# Patient Record
Sex: MALE | Race: WHITE | NOT HISPANIC OR LATINO | Employment: OTHER | URBAN - NONMETROPOLITAN AREA
[De-identification: names, ages, dates, MRNs, and addresses within clinical notes are randomized per-mention and may not be internally consistent; named-entity substitution may affect disease eponyms.]

---

## 2017-01-26 PROBLEM — I25.10 CAD (CORONARY ARTERY DISEASE): Status: ACTIVE | Noted: 2017-01-26

## 2017-01-26 PROBLEM — I10 HTN (HYPERTENSION): Status: ACTIVE | Noted: 2017-01-26

## 2017-01-26 PROBLEM — E78.5 HYPERLIPIDEMIA: Status: ACTIVE | Noted: 2017-01-26

## 2017-01-26 PROBLEM — I21.9 MYOCARDIAL INFARCT (HCC): Status: ACTIVE | Noted: 2017-01-26

## 2017-01-26 RX ORDER — CHLORAL HYDRATE 500 MG
CAPSULE ORAL
COMMUNITY
End: 2020-01-02 | Stop reason: HOSPADM

## 2017-01-26 RX ORDER — SIMVASTATIN 40 MG
40 TABLET ORAL NIGHTLY
COMMUNITY
End: 2017-01-30 | Stop reason: SDUPTHER

## 2017-01-26 RX ORDER — AMLODIPINE BESYLATE 10 MG/1
10 TABLET ORAL DAILY
COMMUNITY
End: 2017-01-30 | Stop reason: SDUPTHER

## 2017-01-26 RX ORDER — ASPIRIN 81 MG/1
81 TABLET ORAL DAILY
Status: ON HOLD | COMMUNITY
End: 2020-01-02 | Stop reason: SDUPTHER

## 2017-01-30 ENCOUNTER — OFFICE VISIT (OUTPATIENT)
Dept: CARDIOLOGY | Facility: CLINIC | Age: 82
End: 2017-01-30

## 2017-01-30 VITALS
WEIGHT: 186 LBS | DIASTOLIC BLOOD PRESSURE: 78 MMHG | BODY MASS INDEX: 28.19 KG/M2 | SYSTOLIC BLOOD PRESSURE: 138 MMHG | HEART RATE: 78 BPM | HEIGHT: 68 IN

## 2017-01-30 DIAGNOSIS — I25.10 CORONARY ARTERY DISEASE INVOLVING NATIVE HEART WITHOUT ANGINA PECTORIS, UNSPECIFIED VESSEL OR LESION TYPE: Primary | ICD-10-CM

## 2017-01-30 DIAGNOSIS — I10 ESSENTIAL HYPERTENSION: ICD-10-CM

## 2017-01-30 DIAGNOSIS — E78.2 MIXED HYPERLIPIDEMIA: ICD-10-CM

## 2017-01-30 PROCEDURE — 93000 ELECTROCARDIOGRAM COMPLETE: CPT | Performed by: NURSE PRACTITIONER

## 2017-01-30 PROCEDURE — 99213 OFFICE O/P EST LOW 20 MIN: CPT | Performed by: NURSE PRACTITIONER

## 2017-01-30 RX ORDER — LISINOPRIL 20 MG/1
20 TABLET ORAL DAILY
Qty: 30 TABLET | Refills: 0 | Status: SHIPPED | OUTPATIENT
Start: 2017-01-30 | End: 2017-12-28 | Stop reason: SDUPTHER

## 2017-01-30 RX ORDER — LISINOPRIL 20 MG/1
20 TABLET ORAL DAILY
Qty: 90 TABLET | Refills: 3 | Status: SHIPPED | OUTPATIENT
Start: 2017-01-30 | End: 2017-12-28 | Stop reason: SDUPTHER

## 2017-01-30 RX ORDER — AMLODIPINE BESYLATE 10 MG/1
10 TABLET ORAL DAILY
Qty: 90 TABLET | Refills: 3 | Status: SHIPPED | OUTPATIENT
Start: 2017-01-30 | End: 2019-02-11 | Stop reason: SDUPTHER

## 2017-01-30 RX ORDER — SIMVASTATIN 40 MG
40 TABLET ORAL NIGHTLY
Qty: 90 TABLET | Refills: 3 | Status: SHIPPED | OUTPATIENT
Start: 2017-01-30 | End: 2019-02-11 | Stop reason: SDUPTHER

## 2017-01-30 NOTE — PROGRESS NOTES
"    Subjective:     Encounter Date:01/30/2017      Patient ID: Mickey Conde is a 81 y.o. male.  He presents to the clinic today for one year follow-up of CAD.  He was last seen in office 10/2015 and had c/o of dizziness at that time.  He denies any CP, dyspnea, dizziness, or syncope since that visit.  He reports mild BLE edema.  His BP is well controlled in office today, and he reports that it is well controlled when checked at home.  His cholesterol is managed by his PCP, and he reports that it was \"good\" on his last yearly check.  Last stress echo in 2009 was negative for ischemia and showed evidence of previous septal infarct.  Lexiscan in 2012 was negative for ischemia.  He states that he is doing well overall.     Chief Complaint:  Coronary Artery Disease   Presents for follow-up visit. Symptoms include leg swelling (Pt reports as mild. ). Pertinent negatives include no chest pain, chest pressure, chest tightness, dizziness, muscle weakness, palpitations, shortness of breath or weight gain. Risk factors include hyperlipidemia. The symptoms have been stable. Compliance with diet is variable. Compliance with exercise is variable. Compliance with medications is good.   Hypertension   This is a chronic problem. The current episode started more than 1 year ago. The problem is controlled. Associated symptoms include peripheral edema. Pertinent negatives include no blurred vision, chest pain, headaches, malaise/fatigue, orthopnea, palpitations, PND or shortness of breath. Risk factors for coronary artery disease include dyslipidemia and male gender. Past treatments include ACE inhibitors. Hypertensive end-organ damage includes CAD/MI.   Hyperlipidemia   This is a chronic problem. The current episode started more than 1 year ago. The problem is controlled. Recent lipid tests were reviewed and are normal (As reported by patient. Managed by PCP. ). Pertinent negatives include no chest pain, leg pain, myalgias or " shortness of breath. Risk factors for coronary artery disease include dyslipidemia, male sex and hypertension.       The following portions of the patient's history were reviewed and updated as appropriate: allergies, current medications, past family history, past medical history, past social history, past surgical history and problem list.   Allergies   Allergen Reactions   • Penicillins        Current Outpatient Prescriptions:   •  amLODIPine (NORVASC) 10 MG tablet, Take 1 tablet by mouth Daily., Disp: 90 tablet, Rfl: 3  •  aspirin 81 MG EC tablet, Take 81 mg by mouth Daily., Disp: , Rfl:   •  lisinopril (PRINIVIL,ZESTRIL) 20 MG tablet, Take 1 tablet by mouth Daily., Disp: 90 tablet, Rfl: 3  •  Omega-3 Fatty Acids (FISH OIL) 1000 MG capsule capsule, Take  by mouth Daily With Breakfast., Disp: , Rfl:   •  simvastatin (ZOCOR) 40 MG tablet, Take 1 tablet by mouth Every Night., Disp: 90 tablet, Rfl: 3  •  lisinopril (PRINIVIL,ZESTRIL) 20 MG tablet, Take 1 tablet by mouth Daily., Disp: 30 tablet, Rfl: 0  Past Medical History   Diagnosis Date   • CAD (coronary artery disease)    • Hyperlipidemia    • Hypertension    • Myocardial infarction      Family History   Problem Relation Age of Onset   • ALS Mother    • Heart attack Father    • Alzheimer's disease Maternal Grandfather    • Heart attack Paternal Grandfather      Social History     Social History   • Marital status: Unknown     Spouse name: N/A   • Number of children: N/A   • Years of education: N/A     Occupational History   • Not on file.     Social History Main Topics   • Smoking status: Former Smoker     Types: Cigarettes     Quit date: 1990   • Smokeless tobacco: Never Used   • Alcohol use No   • Drug use: No   • Sexual activity: Defer     Other Topics Concern   • Not on file     Social History Narrative     Past Surgical History   Procedure Laterality Date   • Rotator cuff repair     • Shoulder surgery     • Carotid stent     • Back surgery     • Tennis elbow  "release           Review of Systems   Constitution: Negative for chills, decreased appetite, fever, weakness, malaise/fatigue, night sweats, weight gain and weight loss.   HENT: Negative for headaches and nosebleeds.    Eyes: Negative for blurred vision and visual disturbance.   Cardiovascular: Positive for leg swelling (Pt reports as mild. ). Negative for chest pain, dyspnea on exertion, near-syncope, orthopnea, palpitations, paroxysmal nocturnal dyspnea and syncope.   Respiratory: Negative for chest tightness, cough, hemoptysis, shortness of breath, snoring and wheezing.    Endocrine: Negative for cold intolerance and heat intolerance.   Hematologic/Lymphatic: Does not bruise/bleed easily.   Skin: Negative for rash.   Musculoskeletal: Negative for back pain, falls, muscle weakness and myalgias.   Gastrointestinal: Negative for abdominal pain, change in bowel habit, constipation, diarrhea, dysphagia, heartburn, nausea and vomiting.   Genitourinary: Negative for hematuria.   Neurological: Negative for dizziness and light-headedness.   Psychiatric/Behavioral: Negative for altered mental status, depression and substance abuse. The patient does not have insomnia.    Allergic/Immunologic: Negative for persistent infections.         ECG 12 Lead  Date/Time: 1/30/2017 9:13 AM  Performed by: VALDO PALACIOS  Authorized by: VALDO PALACIOS   Comparison: compared with previous ECG from 10/30/2015  Rhythm: sinus rhythm and A-V block  Ectopy: infrequent PVCs  Rate: normal  BPM: 78  Conduction: 1st degree  Clinical impression: abnormal ECG          Visit Vitals   • /78   • Pulse 78   • Ht 68\" (172.7 cm)   • Wt 186 lb (84.4 kg)   • BMI 28.28 kg/m2            Objective:     Physical Exam   Constitutional: He is oriented to person, place, and time. Vital signs are normal. He appears well-developed and well-nourished.   HENT:   Head: Normocephalic and atraumatic.   Eyes: Conjunctivae and EOM are normal. Pupils are equal, " round, and reactive to light.   Neck: Normal range of motion. Neck supple. No JVD present.   Cardiovascular: Normal rate, regular rhythm, S1 normal, S2 normal, normal heart sounds, intact distal pulses and normal pulses.  Exam reveals no gallop and no friction rub.    No murmur heard.  Pulses:       Radial pulses are 2+ on the right side, and 2+ on the left side.        Dorsalis pedis pulses are 2+ on the right side, and 2+ on the left side.        Posterior tibial pulses are 2+ on the right side, and 2+ on the left side.   Mild BLE edema.    Pulmonary/Chest: Effort normal and breath sounds normal. No respiratory distress. He has no wheezes.   Abdominal: Soft. Bowel sounds are normal. He exhibits no distension.   Musculoskeletal: Normal range of motion. He exhibits no edema or deformity.   Neurological: He is alert and oriented to person, place, and time.   Skin: Skin is warm and dry.   Psychiatric: He has a normal mood and affect. His behavior is normal. Judgment and thought content normal.   Vitals reviewed.        Assessment:          Diagnosis Plan   1. Coronary artery disease involving native heart without angina pectoris, unspecified vessel or lesion type  On ACE, ASA, and statin. No clinical s/s of ischemia.     on statin and asa.    2. Mixed hyperlipidemia  On statin. Managed by PCP.     on statin, followed by PCP   3. Essential hypertension  On CCB and ACE. Managed by PCP.     Well controlled. Managed by PCP.           Plan:       1. Continue AHA diet and regular exercise.  2. Continue medications as previously prescribed.  3. Report worsening edema in BLE or dyspnea.   4. Continue follow-up with PCP for BP and cholesterol management.   5. Follow-up in one year, or sooner if needed.

## 2017-01-30 NOTE — MR AVS SNAPSHOT
Mickey ALVA Bowies   1/30/2017 8:30 AM   Office Visit    Dept Phone:  [phone]   Encounter #:  [id number]    Provider:  [facility] NP   Department:  [facility]                Your Full Care Plan              Today's Medication Changes          These changes are accurate as of: 1/30/17  9:08 AM.  If you have any questions, ask your nurse or doctor.               Medication(s)that have changed:     * lisinopril 20 MG tablet   Commonly known as:  PRINIVIL,ZESTRIL   Take 1 tablet by mouth Daily.   What changed:  See the new instructions.       * lisinopril 20 MG tablet   Commonly known as:  PRINIVIL,ZESTRIL   Take 1 tablet by mouth Daily.   What changed:  You were already taking a medication with the same name, and this prescription was added. Make sure you understand how and when to take each.       * Notice:  This list has 2 medication(s) that are the same as other medications prescribed for you. Read the directions carefully, and ask your doctor or other care provider to review them with you.         Where to Get Your Medications      These medications were sent to [id number] - [place], [place] - [address] [facility] - [phone]  - [phone] FX  [address], [place] [address]     Phone:  [phone]     amLODIPine 10 MG tablet    lisinopril 20 MG tablet    lisinopril 20 MG tablet    simvastatin 40 MG tablet                  Your Updated Medication List          This list is accurate as of: 1/30/17  9:08 AM.  Always use your most recent med list.                amLODIPine 10 MG tablet   Commonly known as:  NORVASC   Take 1 tablet by mouth Daily.       aspirin 81 MG EC tablet       fish oil 1000 MG capsule capsule       * lisinopril 20 MG tablet   Commonly known as:  PRINIVIL,ZESTRIL   Take 1 tablet by mouth Daily.       * lisinopril 20 MG tablet   Commonly known as:  PRINIVIL,ZESTRIL   Take 1 tablet by mouth Daily.       simvastatin 40 MG tablet      Commonly known as:  ZOCOR   Take 1 tablet by mouth Every Night.       * Notice:  This list has 2 medication(s) that are the same as other medications prescribed for you. Read the directions carefully, and ask your doctor or other care provider to review them with you.            You Were Diagnosed With        Codes Comments    Coronary artery disease involving native heart without angina pectoris, unspecified vessel or lesion type    -  Primary ICD-10-CM: I25.10  ICD-9-CM: 414.01 on statin and asa.     Mixed hyperlipidemia     ICD-10-CM: E78.2  ICD-9-CM: 272.2 on statin, followed by PCP    Essential hypertension     ICD-10-CM: I10  ICD-9-CM: 401.9 Well controlled. Managed by PCP.       Instructions     None    Patient Instructions History      Upcoming Appointments     Visit Type Date Time Department    FOLLOW UP 2017  8:30 AM [facility]      [facility] Signup     [employer] allows you to send messages to your doctor, view your test results, renew your prescriptions, schedule appointments, and more. To sign up, go to [employer] and click on the Sign Up Now link in the New User? box. Enter your Outright Activation Code exactly as it appears below along with the last four digits of your Social Security Number and your Date of Birth () to complete the sign-up process. If you do not sign up before the expiration date, you must request a new code.    Outright Activation Code: [email or web address]  Expires: 2017  9:06 AM    If you have questions, you can email [email or web address] or call [phone] to talk to our Outright staff. Remember, Outright is NOT to be used for urgent needs. For medical emergencies, dial 911.               Other Info from Your Visit           Allergies     Penicillins        Reason for Visit     Follow-up 1 YR, Recheck CAD    Coronary Artery Disease           Vital Signs     Blood Pressure Pulse Height Weight Body Mass Index Smoking Status     "138/78 78 68\" (172.7 cm) 186 lb (84.4 kg) 28.28 kg/m2 Former Smoker      Problems and Diagnoses Noted     Coronary heart disease    High blood pressure    High cholesterol or triglycerides        "

## 2017-01-30 NOTE — LETTER
"January 30, 2017     Ki Tamez MD  [address] [facility] [address]    Patient: Mickey Conde   YOB: 1935   Date of Visit: 1/30/2017       Dear Dr. Dashawn MD:    Mickey Conde was in my office today. Below is a copy of my note.    If you have questions, please do not hesitate to call me. I look forward to following Mickey along with you.         Sincerely,        [facility] NP        CC: No Recipients        Subjective:     Encounter Date:01/30/2017      Patient ID: Mickey Conde is a 81 y.o. male.  He presents to the clinic today for one year follow-up of CAD.  He was last seen in office 10/2015 and had c/o of dizziness at that time.  He denies any CP, dyspnea, dizziness, or syncope since that visit.  He reports mild BLE edema.  His BP is well controlled in office today, and he reports that it is well controlled when checked at home.  His cholesterol is managed by his PCP, and he reports that it was \"good\" on his last yearly check.  Last stress echo in 2009 was negative for ischemia and showed evidence of previous septal infarct.  Lexiscan in 2012 was negative for ischemia.  He states that he is doing well overall.     Chief Complaint:  Coronary Artery Disease   Presents for follow-up visit. Symptoms include leg swelling (Pt reports as mild. ). Pertinent negatives include no chest pain, chest pressure, chest tightness, dizziness, muscle weakness, palpitations, shortness of breath or weight gain. Risk factors include hyperlipidemia. The symptoms have been stable. Compliance with diet is variable. Compliance with exercise is variable. Compliance with medications is good.   Hypertension   This is a chronic problem. The current episode started more than 1 year ago. The problem is controlled. Associated symptoms include peripheral edema. Pertinent negatives include no blurred vision, chest pain, headaches, malaise/fatigue, orthopnea, palpitations, PND or shortness of breath. Risk factors " for coronary artery disease include dyslipidemia and male gender. Past treatments include ACE inhibitors. Hypertensive end-organ damage includes CAD/MI.   Hyperlipidemia   This is a chronic problem. The current episode started more than 1 year ago. The problem is controlled. Recent lipid tests were reviewed and are normal (As reported by patient. Managed by PCP. ). Pertinent negatives include no chest pain, leg pain, myalgias or shortness of breath. Risk factors for coronary artery disease include dyslipidemia, male sex and hypertension.       The following portions of the patient's history were reviewed and updated as appropriate: allergies, current medications, past family history, past medical history, past social history, past surgical history and problem list.   Allergies   Allergen Reactions   • Penicillins        Current Outpatient Prescriptions:   •  amLODIPine (NORVASC) 10 MG tablet, Take 1 tablet by mouth Daily., Disp: 90 tablet, Rfl: 3  •  aspirin 81 MG EC tablet, Take 81 mg by mouth Daily., Disp: , Rfl:   •  lisinopril (PRINIVIL,ZESTRIL) 20 MG tablet, Take 1 tablet by mouth Daily., Disp: 90 tablet, Rfl: 3  •  Omega-3 Fatty Acids (FISH OIL) 1000 MG capsule capsule, Take  by mouth Daily With Breakfast., Disp: , Rfl:   •  simvastatin (ZOCOR) 40 MG tablet, Take 1 tablet by mouth Every Night., Disp: 90 tablet, Rfl: 3  •  lisinopril (PRINIVIL,ZESTRIL) 20 MG tablet, Take 1 tablet by mouth Daily., Disp: 30 tablet, Rfl: 0  Past Medical History   Diagnosis Date   • CAD (coronary artery disease)    • Hyperlipidemia    • Hypertension    • Myocardial infarction      Family History   Problem Relation Age of Onset   • ALS Mother    • Heart attack Father    • Alzheimer's disease Maternal Grandfather    • Heart attack Paternal Grandfather      Social History     Social History   • Marital status: Unknown     Spouse name: N/A   • Number of children: N/A   • Years of education: N/A     Occupational History   • Not on file.      Social History Main Topics   • Smoking status: Former Smoker     Types: Cigarettes     Quit date: 1990   • Smokeless tobacco: Never Used   • Alcohol use No   • Drug use: No   • Sexual activity: Defer     Other Topics Concern   • Not on file     Social History Narrative     Past Surgical History   Procedure Laterality Date   • Rotator cuff repair     • Shoulder surgery     • Carotid stent     • Back surgery     • Tennis elbow release           Review of Systems   Constitution: Negative for chills, decreased appetite, fever, weakness, malaise/fatigue, night sweats, weight gain and weight loss.   HENT: Negative for headaches and nosebleeds.    Eyes: Negative for blurred vision and visual disturbance.   Cardiovascular: Positive for leg swelling (Pt reports as mild. ). Negative for chest pain, dyspnea on exertion, near-syncope, orthopnea, palpitations, paroxysmal nocturnal dyspnea and syncope.   Respiratory: Negative for chest tightness, cough, hemoptysis, shortness of breath, snoring and wheezing.    Endocrine: Negative for cold intolerance and heat intolerance.   Hematologic/Lymphatic: Does not bruise/bleed easily.   Skin: Negative for rash.   Musculoskeletal: Negative for back pain, falls, muscle weakness and myalgias.   Gastrointestinal: Negative for abdominal pain, change in bowel habit, constipation, diarrhea, dysphagia, heartburn, nausea and vomiting.   Genitourinary: Negative for hematuria.   Neurological: Negative for dizziness and light-headedness.   Psychiatric/Behavioral: Negative for altered mental status, depression and substance abuse. The patient does not have insomnia.    Allergic/Immunologic: Negative for persistent infections.         ECG 12 Lead  Date/Time: 1/30/2017 9:13 AM  Performed by: VALDO PALACIOS  Authorized by: VALDO PALACIOS   Comparison: compared with previous ECG from 10/30/2015  Rhythm: sinus rhythm and A-V block  Ectopy: infrequent PVCs  Rate: normal  BPM: 78  Conduction: 1st  "degree  Clinical impression: abnormal ECG          Visit Vitals   • /78   • Pulse 78   • Ht 68\" (172.7 cm)   • Wt 186 lb (84.4 kg)   • BMI 28.28 kg/m2            Objective:     Physical Exam   Constitutional: He is oriented to person, place, and time. Vital signs are normal. He appears well-developed and well-nourished.   HENT:   Head: Normocephalic and atraumatic.   Eyes: Conjunctivae and EOM are normal. Pupils are equal, round, and reactive to light.   Neck: Normal range of motion. Neck supple. No JVD present.   Cardiovascular: Normal rate, regular rhythm, S1 normal, S2 normal, normal heart sounds, intact distal pulses and normal pulses.  Exam reveals no gallop and no friction rub.    No murmur heard.  Pulses:       Radial pulses are 2+ on the right side, and 2+ on the left side.        Dorsalis pedis pulses are 2+ on the right side, and 2+ on the left side.        Posterior tibial pulses are 2+ on the right side, and 2+ on the left side.   Mild BLE edema.    Pulmonary/Chest: Effort normal and breath sounds normal. No respiratory distress. He has no wheezes.   Abdominal: Soft. Bowel sounds are normal. He exhibits no distension.   Musculoskeletal: Normal range of motion. He exhibits no edema or deformity.   Neurological: He is alert and oriented to person, place, and time.   Skin: Skin is warm and dry.   Psychiatric: He has a normal mood and affect. His behavior is normal. Judgment and thought content normal.   Vitals reviewed.        Assessment:          Diagnosis Plan   1. Coronary artery disease involving native heart without angina pectoris, unspecified vessel or lesion type  On ACE, ASA, and statin. No clinical s/s of ischemia.     on statin and asa.    2. Mixed hyperlipidemia  On statin. Managed by PCP.     on statin, followed by PCP   3. Essential hypertension  On CCB and ACE. Managed by PCP.     Well controlled. Managed by PCP.           Plan:       1. Continue AHA diet and regular exercise.  2. " Continue medications as previously prescribed.  3. Report worsening edema in BLE or dyspnea.   4. Continue follow-up with PCP for BP and cholesterol management.   5. Follow-up in one year, or sooner if needed.

## 2017-10-30 ENCOUNTER — OFFICE VISIT (OUTPATIENT)
Dept: VASCULAR SURGERY | Age: 82
End: 2017-10-30
Payer: MEDICARE

## 2017-10-30 VITALS
HEART RATE: 109 BPM | SYSTOLIC BLOOD PRESSURE: 128 MMHG | TEMPERATURE: 97.3 F | DIASTOLIC BLOOD PRESSURE: 78 MMHG | RESPIRATION RATE: 18 BRPM

## 2017-10-30 DIAGNOSIS — I71.40 ABDOMINAL AORTIC ANEURYSM (AAA) WITHOUT RUPTURE: Primary | ICD-10-CM

## 2017-10-30 PROCEDURE — G8484 FLU IMMUNIZE NO ADMIN: HCPCS | Performed by: PHYSICIAN ASSISTANT

## 2017-10-30 PROCEDURE — 1123F ACP DISCUSS/DSCN MKR DOCD: CPT | Performed by: PHYSICIAN ASSISTANT

## 2017-10-30 PROCEDURE — G8421 BMI NOT CALCULATED: HCPCS | Performed by: PHYSICIAN ASSISTANT

## 2017-10-30 PROCEDURE — 1036F TOBACCO NON-USER: CPT | Performed by: PHYSICIAN ASSISTANT

## 2017-10-30 PROCEDURE — 99203 OFFICE O/P NEW LOW 30 MIN: CPT | Performed by: PHYSICIAN ASSISTANT

## 2017-10-30 PROCEDURE — G8427 DOCREV CUR MEDS BY ELIG CLIN: HCPCS | Performed by: PHYSICIAN ASSISTANT

## 2017-10-30 PROCEDURE — 4040F PNEUMOC VAC/ADMIN/RCVD: CPT | Performed by: PHYSICIAN ASSISTANT

## 2017-10-30 RX ORDER — LISINOPRIL 20 MG/1
20 TABLET ORAL
COMMUNITY
Start: 2017-01-30

## 2017-10-30 RX ORDER — AMLODIPINE BESYLATE 10 MG/1
10 TABLET ORAL
COMMUNITY
Start: 2017-01-30

## 2017-10-30 RX ORDER — ASPIRIN 81 MG/1
81 TABLET ORAL
COMMUNITY

## 2017-10-30 RX ORDER — LISINOPRIL 20 MG/1
20 TABLET ORAL
COMMUNITY
Start: 2017-01-30 | End: 2017-10-30

## 2017-10-30 RX ORDER — SIMVASTATIN 40 MG
40 TABLET ORAL
COMMUNITY
Start: 2017-01-30

## 2017-10-30 RX ORDER — CHLORAL HYDRATE 500 MG
CAPSULE ORAL
COMMUNITY

## 2017-10-30 NOTE — PROGRESS NOTES
Patient Care Team:  Reggie Germain as PCP - General      History and Physical:    Mr. No Bautista is a 81 yo male who presents today as a referral from Dr. Cole Baker for evaluation of a infrarenal AAA found on an X-ray lumbar spine. This was done for c/o back pain. He had a CTA abd/pelvis which showed a AAA measuring 3.2 cm with possible penetrating ulcer, DDD at multiple levels. Current medication include statin and asa daily. He has not had abdominal pain. He reports that he has chronic CBP and has a jacobo machine which he uses for his back pain. Approximately 1-2 weeks ago he recalls straining to get upright from his jacobo machine and developed right lower back pain. He took Ibuprofen followed by 2 Aleve which relieved  the pain. He has not had any further pain. Camille Chang is a 80 y.o. male with the following history reviewed and recorded in [facility]:  Patient Active Problem List    Diagnosis Date Noted    Abdominal aortic aneurysm (AAA) without rupture ([facility] [address] 10/30/2017     Current Outpatient Prescriptions   Medication Sig Dispense Refill    amLODIPine (NORVASC) 10 MG tablet Take 10 mg by mouth      aspirin EC 81 MG EC tablet Take 81 mg by mouth      lisinopril (PRINIVIL;ZESTRIL) 20 MG tablet Take 20 mg by mouth      Omega-3 Fatty Acids (FISH OIL) 1000 MG CAPS Take by mouth      simvastatin (ZOCOR) 40 MG tablet Take 40 mg by mouth      Naproxen Sodium (ALEVE PO) Take by mouth       No current facility-administered medications for this visit. Allergies: Penicillins  Past Medical History:   Diagnosis Date    CAD (coronary artery disease)      History reviewed. No pertinent surgical history. Family History   Problem Relation Age of Onset    Heart Attack Father      Social History   Substance Use Topics    Smoking status: Former Smoker    Smokeless tobacco: Never Used    Alcohol use No       Old records have been obtained from the referring providers.   These records have been reviewed and summarized. Review of Systems    Constitutional - no significant activity change, appetite change, or unexpected weight change. No fever or chills. No diaphoresis or significant fatigue. HENT - no significant rhinorrhea or epistaxis. No tinnitus or significant hearing loss. Eyes - no sudden vision change or amaurosis. Respiratory - no significant shortness of breath, wheezing, or stridor. No apnea, cough, or chest tightness associated with shortness of breath. Cardiovascular - no chest pain, syncope, or significant dizziness. No palpitations or significant leg swelling. has not claudication. Gastrointestinal -  has not had abdominal swelling or pain. No blood in stool. No severe constipation, diarrhea, nausea, or vomiting. Genitourinary - No difficulty urinating, dysuria, frequency, or urgency. No flank pain or hematuria. Musculoskeletal - has back pain, gait disturbance, or myalgia. Skin - no color change, rash, pallor, or new wound. Neurologic - no dizziness, facial asymmetry, or light headedness. No seizures. No speech difficulty or lateralizing weakness. Hematologic - no easy bruising or excessive bleeding. Psychiatric - no severe anxiety or nervousness. No confusion. All other review of systems are negative. Physical Exam    /78 Comment: left  Pulse 109   Temp 97.3 °F (36.3 °C)   Resp 18     Constitutional - well developed, well nourished. No diaphoresis or acute distress. HENT - head normocephalic. Right external ear canal appears normal.  Left external ear canal appears normal.  Septum appears midline. Eyes - conjunctiva normal.  EOMS normal.  No exudate. No icterus. Neck- ROM appears normal, no tracheal deviation. Cardiovascular - Regular rate and rhythm. Heart sounds are normal.  No murmur, rub, or gallop. Carotid pulses are 2+ to palpation bilaterally without bruit. Extremities - Radial and brachial pulses are 2+ to palpation bilaterally.  Femoral pulses are palpable. DP and PT pulses are palpable. No cyanosis, clubbing, or significant edema. No signs atheroembolic event. Pulmonary - effort appears normal.  No respiratory distress. Lungs - Breath sounds normal. No wheezes or rales. GI - Abdomen - soft, non tender, bowel sounds X 4 quadrants. No guarding or rebound tenderness. No distension or palpable mass. Genitourinary - deferred. Musculoskeletal - ROM appears normal.  No significant edema. Neurologic - alert and oriented X 3. Physiologic. Skin - warm, dry, and intact. No rash, erythema, or pallor. Psychiatric - mood, affect, and behavior appear normal.  Judgment and thought processes appear normal.    Risk factors for aneurysmal disease including tobacco abuse, hyperlipidemia, male gender, age >57, emphysema, obesity, HTN, atherosclerosis, family history, and diabetes mellitus were discussed with the patient. No orders to display         Assessment    1. Abdominal aortic aneurysm (AAA) without rupture (HCC)          Plan       Continue ASA EC 81 mg daily  Strongly encouraged he continue statin therapy  Recommended no smoking  Symptoms of rupture reviewed with the patient including sudden onset severe back pain or abdominal pain. This pain can sometimes radiate into the groin or leg. The patient may experience a feeling of impending doom or death. If this occurs they have been insturcted to call 911 and get to the emergency room telling them you have an aneurysm. Patient has voiced understanding. We will request the CTA on disc for our viewing. We will call him with further recommendation after reviewing the images    Addendum:  Dr. 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 Since his back pain subsided, we recommend he will repeat his CTA abd/pelvis unless he develops new sudden onset of back or abdominal pain. We will notify the patient.

## 2017-11-01 ENCOUNTER — TELEPHONE (OUTPATIENT)
Dept: VASCULAR SURGERY | Age: 82
End: 2017-11-01

## 2017-11-02 DIAGNOSIS — I71.40 ABDOMINAL AORTIC ANEURYSM (AAA) WITHOUT RUPTURE: Primary | ICD-10-CM

## 2017-12-28 RX ORDER — LISINOPRIL 20 MG/1
TABLET ORAL
Qty: 90 TABLET | Refills: 2 | Status: SHIPPED | OUTPATIENT
Start: 2017-12-28 | End: 2019-02-11 | Stop reason: SDUPTHER

## 2018-02-02 ENCOUNTER — LAB (OUTPATIENT)
Dept: LAB | Facility: HOSPITAL | Age: 83
End: 2018-02-02
Attending: INTERNAL MEDICINE

## 2018-02-02 ENCOUNTER — OFFICE VISIT (OUTPATIENT)
Dept: CARDIOLOGY | Facility: CLINIC | Age: 83
End: 2018-02-02

## 2018-02-02 VITALS
OXYGEN SATURATION: 96 % | BODY MASS INDEX: 28.09 KG/M2 | SYSTOLIC BLOOD PRESSURE: 156 MMHG | HEART RATE: 84 BPM | WEIGHT: 179 LBS | HEIGHT: 67 IN | DIASTOLIC BLOOD PRESSURE: 70 MMHG

## 2018-02-02 DIAGNOSIS — I10 ESSENTIAL HYPERTENSION: ICD-10-CM

## 2018-02-02 DIAGNOSIS — E78.2 MIXED HYPERLIPIDEMIA: ICD-10-CM

## 2018-02-02 DIAGNOSIS — I25.10 CORONARY ARTERY DISEASE INVOLVING NATIVE HEART WITHOUT ANGINA PECTORIS, UNSPECIFIED VESSEL OR LESION TYPE: Primary | ICD-10-CM

## 2018-02-02 DIAGNOSIS — I25.10 CORONARY ARTERY DISEASE INVOLVING NATIVE HEART WITHOUT ANGINA PECTORIS, UNSPECIFIED VESSEL OR LESION TYPE: ICD-10-CM

## 2018-02-02 LAB
ALBUMIN SERPL-MCNC: 4.7 G/DL (ref 3.5–5)
ALBUMIN/GLOB SERPL: 1.9 G/DL (ref 1.1–2.5)
ALP SERPL-CCNC: 55 U/L (ref 24–120)
ALT SERPL W P-5'-P-CCNC: 38 U/L (ref 0–54)
ANION GAP SERPL CALCULATED.3IONS-SCNC: 13 MMOL/L (ref 4–13)
ARTICHOKE IGE QN: 70 MG/DL (ref 0–99)
AST SERPL-CCNC: 24 U/L (ref 7–45)
BILIRUB SERPL-MCNC: 1 MG/DL (ref 0.1–1)
BUN BLD-MCNC: 17 MG/DL (ref 5–21)
BUN/CREAT SERPL: 21.8 (ref 7–25)
CALCIUM SPEC-SCNC: 9.8 MG/DL (ref 8.4–10.4)
CHLORIDE SERPL-SCNC: 100 MMOL/L (ref 98–110)
CHOLEST SERPL-MCNC: 135 MG/DL (ref 130–200)
CO2 SERPL-SCNC: 30 MMOL/L (ref 24–31)
CREAT BLD-MCNC: 0.78 MG/DL (ref 0.5–1.4)
GFR SERPL CREATININE-BSD FRML MDRD: 95 ML/MIN/1.73
GLOBULIN UR ELPH-MCNC: 2.5 GM/DL
GLUCOSE BLD-MCNC: 183 MG/DL (ref 70–100)
HDLC SERPL-MCNC: 59 MG/DL
LDLC/HDLC SERPL: 1.07 {RATIO}
POTASSIUM BLD-SCNC: 3.8 MMOL/L (ref 3.5–5.3)
PROT SERPL-MCNC: 7.2 G/DL (ref 6.3–8.7)
SODIUM BLD-SCNC: 143 MMOL/L (ref 135–145)
TRIGL SERPL-MCNC: 65 MG/DL (ref 0–149)

## 2018-02-02 PROCEDURE — 36415 COLL VENOUS BLD VENIPUNCTURE: CPT

## 2018-02-02 PROCEDURE — 80053 COMPREHEN METABOLIC PANEL: CPT | Performed by: INTERNAL MEDICINE

## 2018-02-02 PROCEDURE — 80061 LIPID PANEL: CPT | Performed by: INTERNAL MEDICINE

## 2018-02-02 PROCEDURE — 99214 OFFICE O/P EST MOD 30 MIN: CPT | Performed by: INTERNAL MEDICINE

## 2018-02-02 PROCEDURE — 93000 ELECTROCARDIOGRAM COMPLETE: CPT | Performed by: INTERNAL MEDICINE

## 2018-02-02 RX ORDER — SIMVASTATIN 40 MG
40 TABLET ORAL NIGHTLY
Qty: 90 TABLET | Refills: 3 | Status: SHIPPED | OUTPATIENT
Start: 2018-02-02 | End: 2019-03-11 | Stop reason: SDUPTHER

## 2018-02-02 RX ORDER — AMLODIPINE BESYLATE 10 MG/1
10 TABLET ORAL DAILY
Qty: 90 TABLET | Refills: 3 | Status: SHIPPED | OUTPATIENT
Start: 2018-02-02 | End: 2019-03-11 | Stop reason: SDUPTHER

## 2018-02-02 RX ORDER — LISINOPRIL 20 MG/1
20 TABLET ORAL DAILY
Qty: 90 TABLET | Refills: 3 | Status: SHIPPED | OUTPATIENT
Start: 2018-02-02 | End: 2019-03-11 | Stop reason: SDUPTHER

## 2018-02-02 RX ORDER — NAPROXEN SODIUM 220 MG
220 TABLET ORAL 2 TIMES DAILY PRN
COMMUNITY
End: 2019-11-13

## 2018-02-02 NOTE — PROGRESS NOTES
Referring Provider: Ki Tamez MD    Reason for Follow-up Visit: CAD    Subjective .   Chief Complaint:   Chief Complaint   Patient presents with   • Follow-up     yearly for CAD, HTN, HDL   • Coronary Artery Disease     pt states having no problems.  doing well.   • Hypertension     doing well.   • Hyperlipidemia     followed by Dr. Tamez.   • Med Refill     needs refills for 90 days on some of his meds       History of present illness:  Mickey Conde is a 82 y.o. yo male with history of CAD, s/p SIMON in the remote past. Denies CP or SOB.        History  Past Medical History:   Diagnosis Date   • Benign essential hypertension    • CAD (coronary artery disease)    • CAD in native artery     Story: lexiscan 1/2012 negative for ischemia   • Enlarged prostate    • Hyperlipemia, mixed     Lipid panel (10/2014) 143/48/81/189   • Hyperlipidemia    • Hypertension    • Myocardial infarction    ,   Past Surgical History:   Procedure Laterality Date   • BACK SURGERY     • CAROTID STENT     • ROTATOR CUFF REPAIR Bilateral    • SHOULDER SURGERY     • TENNIS ELBOW RELEASE     ,   Family History   Problem Relation Age of Onset   • ALS Mother    • Heart attack Father    • Alzheimer's disease Maternal Grandfather    • Heart attack Maternal Grandfather    • Heart attack Paternal Grandfather    ,   Social History   Substance Use Topics   • Smoking status: Former Smoker     Types: Cigarettes     Start date: 1956     Quit date: 1990   • Smokeless tobacco: Never Used   • Alcohol use No   ,     Medications  Current Outpatient Prescriptions   Medication Sig Dispense Refill   • amLODIPine (NORVASC) 10 MG tablet Take 1 tablet by mouth Daily. 90 tablet 3   • aspirin 81 MG EC tablet Take 81 mg by mouth Daily.     • lisinopril (PRINIVIL,ZESTRIL) 20 MG tablet TAKE ONE TABLET BY MOUTH ONE TIME DAILY 90 tablet 2   • Misc Natural Products (OSTEO BI-FLEX ADV DOUBLE ST PO) Take  by mouth.     • naproxen sodium (ALEVE) 220 MG tablet Take 220  "mg by mouth 2 (Two) Times a Day As Needed.     • Omega-3 Fatty Acids (FISH OIL) 1000 MG capsule capsule Take  by mouth Daily With Breakfast.     • simvastatin (ZOCOR) 40 MG tablet Take 1 tablet by mouth Every Night. 90 tablet 3     No current facility-administered medications for this visit.        Allergies:  Penicillins    Review of Systems  Review of Systems   HENT: Negative for nosebleeds.    Cardiovascular: Negative for chest pain, claudication, dyspnea on exertion, irregular heartbeat, leg swelling, near-syncope, orthopnea, palpitations, paroxysmal nocturnal dyspnea and syncope.   Respiratory: Negative for cough, hemoptysis and shortness of breath.    Gastrointestinal: Negative for dysphagia, hematemesis and melena.   Genitourinary: Negative for hematuria.   All other systems reviewed and are negative.      Objective     Physical Exam:  /70 (BP Location: Left arm, Patient Position: Sitting, Cuff Size: Adult)  Pulse 84  Ht 170.2 cm (67\")  Wt 81.2 kg (179 lb)  SpO2 96%  BMI 28.04 kg/m2  Physical Exam   Constitutional: He is oriented to person, place, and time. He appears well-nourished. No distress.   HENT:   Head: Normocephalic.   Eyes: No scleral icterus.   Neck: Normal range of motion. Neck supple.   Cardiovascular: Normal rate, regular rhythm and normal heart sounds.  Exam reveals no gallop and no friction rub.    No murmur heard.  Pulmonary/Chest: Effort normal and breath sounds normal. No respiratory distress. He has no wheezes. He has no rales.   Abdominal: Soft. Bowel sounds are normal. He exhibits no distension. There is no tenderness.   Musculoskeletal: He exhibits no edema.   Neurological: He is alert and oriented to person, place, and time.   Skin: Skin is warm and dry. He is not diaphoretic. No erythema.   Psychiatric: He has a normal mood and affect. His behavior is normal.       Results Review:    ECG 12 Lead  Date/Time: 2/2/2018 8:35 AM  Performed by: DANIEL WILCOX  Authorized by: " DANIEL WILCOX   Comparison: compared with previous ECG   Similar to previous ECG  Rhythm: sinus rhythm  Rate: normal  Conduction: conduction normal  ST Segments: ST segments normal  T Waves: T waves normal  QRS axis: normal  Clinical impression: normal ECG            Hospital Outpatient Visit on 10/29/2014   Component Date Value Ref Range Status   • Total Cholesterol 10/29/2014 143  130 - 200 mg/dL Final   • HDL Cholesterol 10/29/2014 48  mg/dL Final    Comment: DF by IF @ 10/29/2014 16:09  HDL Reference Range  Female: >50  Male: >40     • Triglycerides 10/29/2014 189* 0 - 149 mg/dL Final   • LDL Cholesterol  10/29/2014 81  0 - 99 mg/dL Final   • VLDL Cholesterol 10/29/2014 38* 6 - 32 mg/dL Final   • LDL/HDL Ratio 10/29/2014 1.7  0.0 - 3.4 Final   • Fasting? 10/29/2014 YES   Final   • Sodium 10/29/2014 138  135 - 145 mmol/L Final   • Potassium 10/29/2014 3.5  3.5 - 5.3 mmol/L Final   • Chloride 10/29/2014 98  98 - 110 mmol/L Final   • CO2 10/29/2014 30  24 - 31 mmol/L Final   • Glucose 10/29/2014 107* 70 - 100 mg/dL Final   • BUN 10/29/2014 13  5 - 21 mg/dL Final   • Creatinine 10/29/2014 0.82  0.5 - 1.4 mg/dL Final   • Calcium 10/29/2014 9.5  8.4 - 10.4 mg/dL Final   • Anion Gap 10/29/2014 9  4 - 13 mmol/L Final   • eGFR 10/29/2014 >60  ml/min/1.732 Final    Comment: DF by IF @ 10/29/2014 16:09  GFR Normal                            >60  Chronic Kidney Disease          <60  Kidney Failure                         <15  US by IF @ 10/29/2014 16:09  The MDRD GFR formula is only valid for adults with stable renal function between ages 18 and 70.     • Ionized Calcium 10/29/2014 5.2  4.5 - 5.6 mg/dL Final       Assessment/Plan   Mickey was seen today for follow-up, coronary artery disease, hypertension, hyperlipidemia and med refill.    Diagnoses and all orders for this visit:    Mixed hyperlipidemia, needs to be checked    Essential hypertension, running high today, usually runs lower at home    Coronary artery disease  involving native heart without angina pectoris, unspecified vessel or lesion type, The patient denies chest pain, shortness of breath, dyspnea on exertion, orthopnea, PND, edema. There is no evidence of ongoing ischemia    Other orders  -     simvastatin (ZOCOR) 40 MG tablet; Take 1 tablet by mouth Every Night.  -     lisinopril (PRINIVIL,ZESTRIL) 20 MG tablet; Take 1 tablet by mouth Daily.  -     amLODIPine (NORVASC) 10 MG tablet; Take 1 tablet by mouth Daily.

## 2018-04-17 ENCOUNTER — HOSPITAL ENCOUNTER (OUTPATIENT)
Facility: HOSPITAL | Age: 83
Setting detail: OBSERVATION
Discharge: LEFT BEFORE DISCHARGE | End: 2018-04-18
Attending: FAMILY MEDICINE | Admitting: INTERNAL MEDICINE

## 2018-04-17 DIAGNOSIS — R07.9 CHEST PAIN, UNSPECIFIED TYPE: Primary | ICD-10-CM

## 2018-04-17 LAB
HOLD SPECIMEN: NORMAL
HOLD SPECIMEN: NORMAL
TROPONIN I SERPL-MCNC: <0.012 NG/ML (ref 0–0.03)
WHOLE BLOOD HOLD SPECIMEN: NORMAL
WHOLE BLOOD HOLD SPECIMEN: NORMAL

## 2018-04-17 PROCEDURE — 96372 THER/PROPH/DIAG INJ SC/IM: CPT

## 2018-04-17 PROCEDURE — G0378 HOSPITAL OBSERVATION PER HR: HCPCS

## 2018-04-17 PROCEDURE — 93005 ELECTROCARDIOGRAM TRACING: CPT | Performed by: FAMILY MEDICINE

## 2018-04-17 PROCEDURE — 84484 ASSAY OF TROPONIN QUANT: CPT | Performed by: FAMILY MEDICINE

## 2018-04-17 PROCEDURE — 99284 EMERGENCY DEPT VISIT MOD MDM: CPT

## 2018-04-17 PROCEDURE — 93010 ELECTROCARDIOGRAM REPORT: CPT | Performed by: INTERNAL MEDICINE

## 2018-04-17 PROCEDURE — 25010000002 ENOXAPARIN PER 10 MG: Performed by: FAMILY MEDICINE

## 2018-04-17 PROCEDURE — 84484 ASSAY OF TROPONIN QUANT: CPT | Performed by: INTERNAL MEDICINE

## 2018-04-17 RX ORDER — AMLODIPINE BESYLATE 10 MG/1
10 TABLET ORAL DAILY
Status: DISCONTINUED | OUTPATIENT
Start: 2018-04-18 | End: 2018-04-18 | Stop reason: HOSPADM

## 2018-04-17 RX ORDER — ASPIRIN 81 MG/1
81 TABLET ORAL DAILY
Status: DISCONTINUED | OUTPATIENT
Start: 2018-04-18 | End: 2018-04-18 | Stop reason: HOSPADM

## 2018-04-17 RX ORDER — ALUMINA, MAGNESIA, AND SIMETHICONE 2400; 2400; 240 MG/30ML; MG/30ML; MG/30ML
15 SUSPENSION ORAL EVERY 6 HOURS PRN
Status: DISCONTINUED | OUTPATIENT
Start: 2018-04-17 | End: 2018-04-18 | Stop reason: HOSPADM

## 2018-04-17 RX ORDER — ATORVASTATIN CALCIUM 10 MG/1
20 TABLET, FILM COATED ORAL DAILY
Status: DISCONTINUED | OUTPATIENT
Start: 2018-04-18 | End: 2018-04-18 | Stop reason: HOSPADM

## 2018-04-17 RX ORDER — BISACODYL 5 MG/1
5 TABLET, DELAYED RELEASE ORAL DAILY PRN
Status: DISCONTINUED | OUTPATIENT
Start: 2018-04-17 | End: 2018-04-18 | Stop reason: HOSPADM

## 2018-04-17 RX ORDER — CARVEDILOL 3.12 MG/1
3.12 TABLET ORAL EVERY 12 HOURS SCHEDULED
Status: DISCONTINUED | OUTPATIENT
Start: 2018-04-17 | End: 2018-04-18 | Stop reason: HOSPADM

## 2018-04-17 RX ORDER — SODIUM CHLORIDE 0.9 % (FLUSH) 0.9 %
1-10 SYRINGE (ML) INJECTION AS NEEDED
Status: DISCONTINUED | OUTPATIENT
Start: 2018-04-17 | End: 2018-04-18 | Stop reason: HOSPADM

## 2018-04-17 RX ORDER — NITROGLYCERIN 0.4 MG/1
0.4 TABLET SUBLINGUAL
Status: DISCONTINUED | OUTPATIENT
Start: 2018-04-17 | End: 2018-04-18 | Stop reason: HOSPADM

## 2018-04-17 RX ORDER — ONDANSETRON 4 MG/1
4 TABLET, FILM COATED ORAL EVERY 6 HOURS PRN
Status: DISCONTINUED | OUTPATIENT
Start: 2018-04-17 | End: 2018-04-18 | Stop reason: HOSPADM

## 2018-04-17 RX ORDER — LISINOPRIL 20 MG/1
20 TABLET ORAL DAILY
Status: DISCONTINUED | OUTPATIENT
Start: 2018-04-18 | End: 2018-04-18 | Stop reason: HOSPADM

## 2018-04-17 RX ORDER — ACETAMINOPHEN 325 MG/1
650 TABLET ORAL EVERY 4 HOURS PRN
Status: DISCONTINUED | OUTPATIENT
Start: 2018-04-17 | End: 2018-04-18 | Stop reason: HOSPADM

## 2018-04-17 RX ADMIN — CARVEDILOL 3.12 MG: 3.12 TABLET, FILM COATED ORAL at 23:29

## 2018-04-17 RX ADMIN — ENOXAPARIN SODIUM 70 MG: 80 INJECTION SUBCUTANEOUS at 21:16

## 2018-04-18 ENCOUNTER — APPOINTMENT (OUTPATIENT)
Dept: CARDIOLOGY | Facility: HOSPITAL | Age: 83
End: 2018-04-18
Attending: INTERNAL MEDICINE

## 2018-04-18 VITALS
DIASTOLIC BLOOD PRESSURE: 69 MMHG | HEART RATE: 72 BPM | OXYGEN SATURATION: 96 % | WEIGHT: 167.2 LBS | SYSTOLIC BLOOD PRESSURE: 133 MMHG | RESPIRATION RATE: 18 BRPM | HEIGHT: 68 IN | BODY MASS INDEX: 25.34 KG/M2 | TEMPERATURE: 97.9 F

## 2018-04-18 LAB
ALBUMIN SERPL-MCNC: 4.2 G/DL (ref 3.5–5)
ALBUMIN/GLOB SERPL: 1.6 G/DL (ref 1.1–2.5)
ALP SERPL-CCNC: 52 U/L (ref 24–120)
ALT SERPL W P-5'-P-CCNC: 55 U/L (ref 0–54)
ANION GAP SERPL CALCULATED.3IONS-SCNC: 13 MMOL/L (ref 4–13)
ARTICHOKE IGE QN: 61 MG/DL (ref 0–99)
AST SERPL-CCNC: 49 U/L (ref 7–45)
BASOPHILS # BLD AUTO: 0.07 10*3/MM3 (ref 0–0.2)
BASOPHILS NFR BLD AUTO: 1.1 % (ref 0–2)
BILIRUB SERPL-MCNC: 1.6 MG/DL (ref 0.1–1)
BUN BLD-MCNC: 10 MG/DL (ref 5–21)
BUN/CREAT SERPL: 15.9 (ref 7–25)
CALCIUM SPEC-SCNC: 9.2 MG/DL (ref 8.4–10.4)
CHLORIDE SERPL-SCNC: 98 MMOL/L (ref 98–110)
CHOLEST SERPL-MCNC: 123 MG/DL (ref 130–200)
CO2 SERPL-SCNC: 30 MMOL/L (ref 24–31)
CREAT BLD-MCNC: 0.63 MG/DL (ref 0.5–1.4)
DEPRECATED RDW RBC AUTO: 38.3 FL (ref 40–54)
EOSINOPHIL # BLD AUTO: 0.04 10*3/MM3 (ref 0–0.7)
EOSINOPHIL NFR BLD AUTO: 0.6 % (ref 0–4)
ERYTHROCYTE [DISTWIDTH] IN BLOOD BY AUTOMATED COUNT: 13.2 % (ref 12–15)
GFR SERPL CREATININE-BSD FRML MDRD: 122 ML/MIN/1.73
GLOBULIN UR ELPH-MCNC: 2.7 GM/DL
GLUCOSE BLD-MCNC: 106 MG/DL (ref 70–100)
HBA1C MFR BLD: 6.9 %
HCT VFR BLD AUTO: 50.2 % (ref 40–52)
HDLC SERPL-MCNC: 40 MG/DL
HGB BLD-MCNC: 17.5 G/DL (ref 14–18)
IMM GRANULOCYTES # BLD: 0.17 10*3/MM3 (ref 0–0.03)
IMM GRANULOCYTES NFR BLD: 2.7 % (ref 0–5)
LDLC/HDLC SERPL: 1.35 {RATIO}
LIPASE SERPL-CCNC: 147 U/L (ref 23–203)
LYMPHOCYTES # BLD AUTO: 1.58 10*3/MM3 (ref 0.72–4.86)
LYMPHOCYTES NFR BLD AUTO: 24.9 % (ref 15–45)
MCH RBC QN AUTO: 28.2 PG (ref 28–32)
MCHC RBC AUTO-ENTMCNC: 34.9 G/DL (ref 33–36)
MCV RBC AUTO: 80.8 FL (ref 82–95)
MONOCYTES # BLD AUTO: 0.93 10*3/MM3 (ref 0.19–1.3)
MONOCYTES NFR BLD AUTO: 14.7 % (ref 4–12)
NEUTROPHILS # BLD AUTO: 3.55 10*3/MM3 (ref 1.87–8.4)
NEUTROPHILS NFR BLD AUTO: 56 % (ref 39–78)
NRBC BLD MANUAL-RTO: 0 /100 WBC (ref 0–0)
NT-PROBNP SERPL-MCNC: 137 PG/ML (ref 0–1800)
PLATELET # BLD AUTO: 264 10*3/MM3 (ref 130–400)
PMV BLD AUTO: 10.1 FL (ref 6–12)
POTASSIUM BLD-SCNC: 3.3 MMOL/L (ref 3.5–5.3)
PROT SERPL-MCNC: 6.9 G/DL (ref 6.3–8.7)
RBC # BLD AUTO: 6.21 10*6/MM3 (ref 4.8–5.9)
SODIUM BLD-SCNC: 141 MMOL/L (ref 135–145)
TRIGL SERPL-MCNC: 145 MG/DL (ref 0–149)
TROPONIN I SERPL-MCNC: 0.01 NG/ML (ref 0–0.03)
TROPONIN I SERPL-MCNC: <0.012 NG/ML (ref 0–0.03)
TROPONIN I SERPL-MCNC: <0.012 NG/ML (ref 0–0.03)
WBC NRBC COR # BLD: 6.34 10*3/MM3 (ref 4.8–10.8)

## 2018-04-18 PROCEDURE — 93018 CV STRESS TEST I&R ONLY: CPT | Performed by: INTERNAL MEDICINE

## 2018-04-18 PROCEDURE — 99219 PR INITIAL OBSERVATION CARE/DAY 50 MINUTES: CPT | Performed by: INTERNAL MEDICINE

## 2018-04-18 PROCEDURE — 93350 STRESS TTE ONLY: CPT

## 2018-04-18 PROCEDURE — 93350 STRESS TTE ONLY: CPT | Performed by: INTERNAL MEDICINE

## 2018-04-18 PROCEDURE — 85025 COMPLETE CBC W/AUTO DIFF WBC: CPT | Performed by: INTERNAL MEDICINE

## 2018-04-18 PROCEDURE — 84484 ASSAY OF TROPONIN QUANT: CPT | Performed by: INTERNAL MEDICINE

## 2018-04-18 PROCEDURE — 83036 HEMOGLOBIN GLYCOSYLATED A1C: CPT | Performed by: INTERNAL MEDICINE

## 2018-04-18 PROCEDURE — 25010000003 DOBUTAMINE PER 250 MG: Performed by: INTERNAL MEDICINE

## 2018-04-18 PROCEDURE — 25010000002 PERFLUTREN 6.52 MG/ML SUSPENSION: Performed by: INTERNAL MEDICINE

## 2018-04-18 PROCEDURE — 80053 COMPREHEN METABOLIC PANEL: CPT | Performed by: INTERNAL MEDICINE

## 2018-04-18 PROCEDURE — 93017 CV STRESS TEST TRACING ONLY: CPT

## 2018-04-18 PROCEDURE — G0378 HOSPITAL OBSERVATION PER HR: HCPCS

## 2018-04-18 PROCEDURE — 93352 ADMIN ECG CONTRAST AGENT: CPT | Performed by: INTERNAL MEDICINE

## 2018-04-18 PROCEDURE — 83880 ASSAY OF NATRIURETIC PEPTIDE: CPT | Performed by: INTERNAL MEDICINE

## 2018-04-18 PROCEDURE — 80061 LIPID PANEL: CPT | Performed by: INTERNAL MEDICINE

## 2018-04-18 PROCEDURE — 83690 ASSAY OF LIPASE: CPT | Performed by: INTERNAL MEDICINE

## 2018-04-18 RX ORDER — DOBUTAMINE HYDROCHLORIDE 100 MG/100ML
10 INJECTION INTRAVENOUS
Status: DISCONTINUED | OUTPATIENT
Start: 2018-04-18 | End: 2018-04-18 | Stop reason: HOSPADM

## 2018-04-18 RX ADMIN — LISINOPRIL 20 MG: 20 TABLET ORAL at 09:10

## 2018-04-18 RX ADMIN — ASPIRIN 81 MG: 81 TABLET ORAL at 09:10

## 2018-04-18 RX ADMIN — ATORVASTATIN CALCIUM 20 MG: 10 TABLET, FILM COATED ORAL at 09:10

## 2018-04-18 RX ADMIN — AMLODIPINE BESYLATE 10 MG: 10 TABLET ORAL at 09:09

## 2018-04-18 RX ADMIN — CARVEDILOL 3.12 MG: 3.12 TABLET, FILM COATED ORAL at 09:09

## 2018-04-18 RX ADMIN — ATROPINE SULFATE 0.3 MG: 0.1 INJECTION PARENTERAL at 13:05

## 2018-04-18 RX ADMIN — PERFLUTREN 8.48 MG: 6.52 INJECTION, SUSPENSION INTRAVENOUS at 12:09

## 2018-04-18 RX ADMIN — DOBUTAMINE 10 MCG/KG/MIN: 12.5 INJECTION, SOLUTION, CONCENTRATE INTRAVENOUS at 12:09

## 2018-04-18 NOTE — PLAN OF CARE
Problem: Patient Care Overview  Goal: Plan of Care Review   04/18/18 0678   Coping/Psychosocial   Plan of Care Reviewed With patient   Plan of Care Review   Progress no change   OTHER   Outcome Summary no pain a/o await stress test results tel on wants to go home     Goal: Individualization and Mutuality  Outcome: Ongoing (interventions implemented as appropriate)    Goal: Interprofessional Rounds/Family Conf  Outcome: Ongoing (interventions implemented as appropriate)      Problem: Cardiac: ACS (Acute Coronary Syndrome) (Adult)  Goal: Signs and Symptoms of Listed Potential Problems Will be Absent, Minimized or Managed (Cardiac: ACS)  Outcome: Ongoing (interventions implemented as appropriate)

## 2018-04-18 NOTE — H&P
"Subjective   Mickey Conde is a 82 y.o. male is here today.    Chief Complaint: \"I just feel bad\"    HPI: Mr Conde is an 82-year-old male with significant past medical history of coronary artery disease status post remote stenting, hypertension, hyperlipidemia, and BPH.  He was admitted to the cardiology service overnight with reported complaints of chest pain.  Patient is asked specifically about this he denies any chest pain.  He does note some vague epigastric pain.  He notes he generally just feels bad.  He has no appetite and gets full very easily.  He reports having lost 20-30 pounds over the last year but notes he is trying to get back down to his goal weight.  He denies any chest pain with exertion or shortness of breath.  Symptoms have been gradually progressing for some time.      Patient Active Problem List   Diagnosis   • CAD (coronary artery disease)   • HTN (hypertension)   • Hyperlipidemia   • Myocardial infarct   • Chest pain       Past Medical History:   Diagnosis Date   • Benign essential hypertension    • CAD (coronary artery disease)    • CAD in native artery     Story: lexiscan 1/2012 negative for ischemia   • Enlarged prostate    • Hyperlipemia, mixed     Lipid panel (10/2014) 143/48/81/189   • Hyperlipidemia    • Hypertension    • Myocardial infarction      Past Surgical History:   Procedure Laterality Date   • BACK SURGERY     • CAROTID STENT     • ROTATOR CUFF REPAIR Bilateral    • SHOULDER SURGERY     • TENNIS ELBOW RELEASE         The following portions of the patient's history were reviewed and updated as appropriate: allergies, current medications, past family history, past medical history, past social history, past surgical history and problem list.    Social History     Social History   • Marital status:      Social History Main Topics   • Smoking status: Former Smoker     Types: Cigarettes     Start date: 1956     Quit date: 1990   • Smokeless tobacco: Never Used   • Alcohol " "use No   • Drug use: No   • Sexual activity: Defer     Other Topics Concern   • Not on file       Family History   Problem Relation Age of Onset   • ALS Mother    • Heart attack Father    • Alzheimer's disease Maternal Grandfather    • Heart attack Maternal Grandfather    • Heart attack Paternal Grandfather        Review of Systems: A 12 system review of systems was completed and is negative except stated in HPI.     Objective   /78 (BP Location: Right arm, Patient Position: Lying)   Pulse 68   Temp 97.6 °F (36.4 °C) (Temporal Artery )   Resp 22   Ht 172.7 cm (68\")   Wt 75.8 kg (167 lb 3.2 oz)   SpO2 95%   BMI 25.42 kg/m²     Physical Exam:  Physical Exam   Constitutional: He is oriented to person, place, and time. He appears well-developed and well-nourished.   HENT:   Head: Normocephalic and atraumatic.   Eyes: Conjunctivae and EOM are normal. Pupils are equal, round, and reactive to light.   Neck: Normal range of motion. Neck supple. No JVD present. No thyromegaly present.   Cardiovascular: Normal rate, regular rhythm and normal heart sounds.    No murmur heard.  Pulmonary/Chest: Effort normal and breath sounds normal. He has no wheezes. He has no rales.   Abdominal: Soft. Bowel sounds are normal. He exhibits no distension. There is no tenderness.   Musculoskeletal: Normal range of motion. He exhibits no edema.   Neurological: He is alert and oriented to person, place, and time. Coordination normal.   Skin: Skin is warm and dry. No rash noted.   Psychiatric: He has a normal mood and affect. His behavior is normal.       Lab Results   Component Value Date    TROPONINI <0.012 04/17/2018     Lab Results   Component Value Date    GLUCOSE 183 (H) 02/02/2018    CALCIUM 9.8 02/02/2018     02/02/2018    K 3.8 02/02/2018    CO2 30.0 02/02/2018     02/02/2018    BUN 17 02/02/2018    CREATININE 0.78 02/02/2018    EGFRIFNONA 95 02/02/2018    BCR 21.8 02/02/2018    ANIONGAP 13.0 02/02/2018     Lab " Results   Component Value Date    WBC 6.34 04/18/2018    HGB 17.5 04/18/2018    HCT 50.2 04/18/2018    MCV 80.8 (L) 04/18/2018     04/18/2018     Lab Results   Component Value Date    CHOL 135 02/02/2018     Lab Results   Component Value Date    TRIG 65 02/02/2018    TRIG 189 (H) 10/29/2014     Lab Results   Component Value Date    HDL 59 02/02/2018    HDL 48 10/29/2014     No components found for: LDLCALC  Lab Results   Component Value Date    LDL 70 02/02/2018    LDL 81 10/29/2014         Assessment:  1. Chest pain: Symptoms are vague and are more consistent with epigastric pain.  Symptoms are more concerning for GI etiology.  Troponins within normal limits.  2.  Coronary artery disease: History of remote PCI.  Most recently had negative Lexiscan in 2012.  3.  Epigastric pain  4.  Early satiety   5.  Hypertension  6.  Hyperlipidemia  7.  Weight loss: Patient reports weight loss of 20-30 pounds, much of which sounds intentional.    Plan:  - Will evaluate further with a stress echo given the vague nature and questionable chest pain symptoms.  - If negative will consider GI evaluation  - Check lipase

## 2018-04-18 NOTE — PLAN OF CARE
Problem: Patient Care Overview  Goal: Plan of Care Review  Outcome: Ongoing (interventions implemented as appropriate)   04/18/18 0647   Coping/Psychosocial   Plan of Care Reviewed With patient   Plan of Care Review   Progress no change   OTHER   Outcome Summary No c/o chest pain this shift. Troponin negative. VSS. SR with PVCs on tele. Pt held NPO for stress test this AM.       Problem: Cardiac: ACS (Acute Coronary Syndrome) (Adult)  Goal: Signs and Symptoms of Listed Potential Problems Will be Absent, Minimized or Managed (Cardiac: ACS)  Outcome: Ongoing (interventions implemented as appropriate)

## 2018-04-18 NOTE — ED PROVIDER NOTES
"    [facility]  eMERGENCY dEPARTMENT eNCOUnter      Pt Name: Mickey Conde  MRN: [id number]  Birthdate 1935  Date of evaluation: 4/17/2018      CHIEF COMPLAINT       Chief Complaint   Patient presents with   • NON-STEMI-SOL TRANSFER       Nurses Notes reviewed and I agree except as noted in the HPI.      HISTORY OF PRESENT ILLNESS    Mickey Conde is a 82 y.o. male who presents     Patient presents for chest pain from [facility].  Patient was at [facility] and had chest pain there they thought he had an elevated troponin so they sent him here for further evaluation and treatment.  Repeat troponin here was negative for acute elevation of that.  Patient very poor inconsistent historian.  I first patient stated that he had one stent.  Then he said that he had that one replaced once then he remembered the third time that he had had another intervention.  Patient states he thinks he \"might have a couple of stents may be 3. \"    REVIEW OF SYSTEMS     Review of Systems  CONSTITUTION: No Fever, No chills, No activity change, No diaphoresis, No unexpected wt change.    HENT: No congestion, no dental problems, no ear pain or discharge, , no nuchal rigidity, no meningeal signs.  EYES: No drainage, no itching, no photophobia, no visual disturbance  RESPIRATORY: No apnea, no chest tightness, no cough, no shortness of breath, no stridor, no wheezing  CARDIOVASCULAR: No chest pain, no palpitations, no leg swelling  GI: No abdominal distention, no abdominal pain, no rectal bleeding, no melena, no hematochezia, no diarrhea, no nausea, no vomiting  ENDOCRINE: No polydipsia, no polyphagia, no polyuria, no cold or heat intolerance  : No difficulty urinating, no dyspareunia, no dysuria, no flank pain, no frequency, no genital sore, no hematuria, no menstrual problem if female, no decreased urination, no hesitation of urination, no vaginal discharge if female, no vaginal pain if female no penile pain if " male  ALLERG/IMMUNO:  No env or food allergies, not immunocompromised  NEUROLOGICAL: No dizziness, no facial asymmetry, no Headaches, no Light-headedness, no numbness nor paresthesias, no seizures, no speech difficulty, No syncope, no tremors, no weakness  HEMATOLOGIC: No adenopathy, no unusual bleeding or bruising  MUSC: No arthralgia, no back pain, no joint swelling, no myalgias, no neck pain, no neck stiffness, Pulses 2/4 in all extremities.  SKIN: No rash, no color change, no pallor, no wound  PSYCH: No agitation, no behavior problem, no confusion, no decreased concentration, no hallucinations, no suicidal ideation, no homicidal ideation, no self injury, no sleep disturbance  All other systems negative.    PAST MEDICAL HISTORY     Past Medical History:   Diagnosis Date   • Benign essential hypertension    • CAD (coronary artery disease)    • CAD in native artery     Story: lexiscan 1/2012 negative for ischemia   • Enlarged prostate    • Hyperlipemia, mixed     Lipid panel (10/2014) 143/48/81/189   • Hyperlipidemia    • Hypertension    • Myocardial infarction        SURGICAL HISTORY      has a past surgical history that includes Rotator cuff repair (Bilateral); Shoulder surgery; Carotid stent; Back surgery; and Tennis Elbow Release.    CURRENT MEDICATIONS        Medication List      ASK your doctor about these medications    * amLODIPine 10 MG tablet  Commonly known as:  NORVASC  Take 1 tablet by mouth Daily.     * amLODIPine 10 MG tablet  Commonly known as:  NORVASC  Take 1 tablet by mouth Daily.     aspirin 81 MG EC tablet     fish oil 1000 MG capsule capsule     * lisinopril 20 MG tablet  Commonly known as:  PRINIVIL,ZESTRIL  TAKE ONE TABLET BY MOUTH ONE TIME DAILY     * lisinopril 20 MG tablet  Commonly known as:  PRINIVIL,ZESTRIL  Take 1 tablet by mouth Daily.     naproxen sodium 220 MG tablet  Commonly known as:  ALEVE     OSTEO BI-FLEX ADV DOUBLE ST PO     * simvastatin 40 MG tablet  Commonly known as:   "ZOCOR  Take 1 tablet by mouth Every Night.     * simvastatin 40 MG tablet  Commonly known as:  ZOCOR  Take 1 tablet by mouth Every Night.        * This list has 6 medication(s) that are the same as other medications   prescribed for you. Read the directions carefully, and ask your doctor or   other care provider to review them with you.              ALLERGIES     is allergic to penicillins.    FAMILY HISTORY     indicated that his mother is . He indicated that his father is . He indicated that his maternal grandfather is . He indicated that his paternal grandfather is .    family history includes ALS in his mother; Alzheimer's disease in his maternal grandfather; Heart attack in his father, maternal grandfather, and paternal grandfather.    SOCIAL HISTORY      reports that he quit smoking about 28 years ago. His smoking use included Cigarettes. He started smoking about 62 years ago. He has never used smokeless tobacco. He reports that he does not drink alcohol or use drugs.    PHYSICAL EXAM     INITIAL VITALS:  height is 172.7 cm (68\") and weight is 74.8 kg (165 lb). His temperature is 99.2 °F (37.3 °C). His blood pressure is 142/73 and his pulse is 69. His respiration is 18 and oxygen saturation is 92%.    Physical Exam    CONSTITUTIONAL: Well developed, well nourished, not diaphoretic nor distressed  HENT: Normocephalic, atraumatic, oropharynx clear and moist  EYES: PERRL, EOM normal, no discharge, no scleral icterus  NECK: ROM normal, supple, no tracheal deviation nor JVD, no stridor  CARDIOVASCULAR: Normal rate and rhythm, heart sounds normal, no rub no gallop, intact distal pulses, normal cap refill  PULMONARY: Normal effort and breath sounds, no distress, no wheezes, rhonchi or rales, no chest tenderness  ABDOMINAL: Soft, nontender, no guarding, no mass, no rebound, no hernia  GENITOURINARY/ANORECTAL: deferred  MUSCULOSKELETAL: ROM normal, no tenderness nor deformity, no " "edema  NEUROLOGICAL: Alert, oriented x 3, DTRS normal, CN x 10 normal, normal tone  SKIN: Warm, dry, no erythema, no rash, normal color  PSYCH: Mood and affect normal, behavior normal, thought content and judgement normal.      DIFFERENTIAL DIAGNOSIS:       DIAGNOSTIC RESULTS     EKG: All EKG's are interpreted by the Emergency Department Physician who either signs or Co-signs this chart in the absence of a cardiologist.  EKG normal sinus rhythm left axis deviation Q waves in 3 aVF and 2.  No acute ST wave elevation noted.  Rate is 70 bpm.    RADIOLOGY: non-plain film images(s) such as CT, Ultrasound and MRI are read by the radiologist.  Plain radiographic images are visualized and preliminarily interpreted by the emergency physician unless otherwise stated below.           LABS:   Lab Results (last 24 hours)     Procedure Component Value Units Date/Time    Troponin [[id number]]  (Normal) Collected:  04/17/18 1923    Specimen:  Blood from Arm, Left Updated:  04/17/18 2024     Troponin I <0.012 ng/mL           EMERGENCY DEPARTMENT COURSE:   Vitals:    Vitals:    04/17/18 1916 04/17/18 1922   BP:  142/73   BP Location:  Right arm   Patient Position:  Lying   Pulse: 69    Resp: 18    Temp: 99.2 °F (37.3 °C)    SpO2: 92%    Weight: 74.8 kg (165 lb)    Height: 172.7 cm (68\")        The patient was given the following medications:  Medications - No data to display    ED Course       CRITICAL CARE:  none    CONSULTS:  none    PROCEDURES:  None    FINAL IMPRESSION      1. Chest pain, unspecified type          DISPOSITION/PLAN   Patient admitted to the service of Dr. Padilla in stable condition.      PATIENT REFERRED TO:  No follow-up provider specified.    DISCHARGE MEDICATIONS:     Medication List      ASK your doctor about these medications    * amLODIPine 10 MG tablet  Commonly known as:  NORVASC  Take 1 tablet by mouth Daily.     * amLODIPine 10 MG tablet  Commonly known as:  NORVASC  Take 1 tablet by mouth Daily.   "   aspirin 81 MG EC tablet     fish oil 1000 MG capsule capsule     * lisinopril 20 MG tablet  Commonly known as:  PRINIVIL,ZESTRIL  TAKE ONE TABLET BY MOUTH ONE TIME DAILY     * lisinopril 20 MG tablet  Commonly known as:  PRINIVIL,ZESTRIL  Take 1 tablet by mouth Daily.     naproxen sodium 220 MG tablet  Commonly known as:  ALEVE     OSTEO BI-FLEX ADV DOUBLE ST PO     * simvastatin 40 MG tablet  Commonly known as:  ZOCOR  Take 1 tablet by mouth Every Night.     * simvastatin 40 MG tablet  Commonly known as:  ZOCOR  Take 1 tablet by mouth Every Night.        * This list has 6 medication(s) that are the same as other medications   prescribed for you. Read the directions carefully, and ask your doctor or   other care provider to review them with you.              (Please note that portions of this note were completed with a voice recognition program.)    Aparna Lancaster, DO Aparna Lancaster DO  04/17/18 0033

## 2018-04-18 NOTE — PROGRESS NOTES
Discharge Planning Assessment  [facility]     Patient Name: Mickey Conde  MRN: [id number]  Today's Date: 4/18/2018    Admit Date: 4/17/2018          Discharge Needs Assessment     Row Name 04/18/18 1544       Living Environment    Lives With alone    Current Living Arrangements home/apartment/condo    Primary Care Provided by self    Provides Primary Care For no one    Family Caregiver if Needed child(christopher), adult    Quality of Family Relationships helpful    Able to Return to Prior Arrangements yes       Resource/Environmental Concerns    Resource/Environmental Concerns none    Transportation Concerns car, none       Transition Planning    Patient/Family Anticipates Transition to home    Patient/Family Anticipated Services at Transition none    Transportation Anticipated family or friend will provide       Discharge Needs Assessment    Readmission Within the Last 30 Days no previous admission in last 30 days    Concerns to be Addressed no discharge needs identified;denies needs/concerns at this time    Equipment Currently Used at Home none    Anticipated Changes Related to Illness none    Equipment Needed After Discharge none            Discharge Plan     Row Name 04/18/18 1548       Plan    Plan PT resides at home and has a son nearby who can assist as needed. PT plans to dc home and denies any dc needs. PT is declining HH. Will follow.     Patient/Family in Agreement with Plan yes        Destination     No service coordination in this encounter.      Durable Medical Equipment     No service coordination in this encounter.      Dialysis/Infusion     No service coordination in this encounter.      Home Medical Care     No service coordination in this encounter.      Social Care     No service coordination in this encounter.                Demographic Summary    No documentation.           Functional Status    No documentation.           Psychosocial    No documentation.           Abuse/Neglect    No  documentation.           Legal    No documentation.           Substance Abuse    No documentation.           Patient Forms    No documentation.         Kristin Marx, MSW

## 2018-04-19 LAB
BH CV ECHO MEAS - BSA(HAYCOCK): 1.9 M^2
BH CV ECHO MEAS - BSA: 1.9 M^2
BH CV ECHO MEAS - BZI_BMI: 24.3 KILOGRAMS/M^2
BH CV ECHO MEAS - BZI_METRIC_HEIGHT: 172.7 CM
BH CV ECHO MEAS - BZI_METRIC_WEIGHT: 72.6 KG
BH CV ECHO MEAS - EF(MOD-BP): 55 %
BH CV STRESS BP STAGE 1: NORMAL
BH CV STRESS BP STAGE 2: NORMAL
BH CV STRESS BP STAGE 3: NORMAL
BH CV STRESS BP STAGE 4: NORMAL
BH CV STRESS DOB - ATROPINE STAGE 3: 0.3
BH CV STRESS DOSE DOBUTAMINE STAGE 1: 10
BH CV STRESS DOSE DOBUTAMINE STAGE 2: 20
BH CV STRESS DOSE DOBUTAMINE STAGE 3: 30
BH CV STRESS DOSE DOBUTAMINE STAGE 4: 40
BH CV STRESS DURATION MIN STAGE 1: 3
BH CV STRESS DURATION MIN STAGE 2: 3
BH CV STRESS DURATION MIN STAGE 3: 3
BH CV STRESS DURATION MIN STAGE 4: 1
BH CV STRESS DURATION SEC STAGE 1: 0
BH CV STRESS DURATION SEC STAGE 2: 0
BH CV STRESS DURATION SEC STAGE 3: 0
BH CV STRESS DURATION SEC STAGE 4: 18
BH CV STRESS ECHO POST STRESS EJECTION FRACTION EF: 65 %
BH CV STRESS HR STAGE 1: 66
BH CV STRESS HR STAGE 2: 79
BH CV STRESS HR STAGE 3: 88
BH CV STRESS HR STAGE 4: 127
BH CV STRESS PROTOCOL 1: NORMAL
BH CV STRESS RECOVERY BP: NORMAL MMHG
BH CV STRESS RECOVERY HR: 96 BPM
BH CV STRESS STAGE 1: 1
BH CV STRESS STAGE 2: 2
BH CV STRESS STAGE 3: 3
BH CV STRESS STAGE 4: 4
LV EF 2D ECHO EST: 55 %
MAXIMAL PREDICTED HEART RATE: 138 BPM
PERCENT MAX PREDICTED HR: 92.03 %
STRESS BASELINE BP: NORMAL MMHG
STRESS BASELINE HR: 67 BPM
STRESS PERCENT HR: 108 %
STRESS POST EXERCISE DUR MIN: 10 MIN
STRESS POST EXERCISE DUR SEC: 18 SEC
STRESS POST PEAK BP: NORMAL MMHG
STRESS POST PEAK HR: 127 BPM
STRESS TARGET HR: 117 BPM

## 2018-05-18 DIAGNOSIS — I71.40 ABDOMINAL AORTIC ANEURYSM (AAA) WITHOUT RUPTURE: Primary | ICD-10-CM

## 2019-02-11 ENCOUNTER — OFFICE VISIT (OUTPATIENT)
Dept: CARDIOLOGY | Facility: CLINIC | Age: 84
End: 2019-02-11

## 2019-02-11 VITALS
HEART RATE: 80 BPM | BODY MASS INDEX: 25.16 KG/M2 | SYSTOLIC BLOOD PRESSURE: 148 MMHG | DIASTOLIC BLOOD PRESSURE: 86 MMHG | HEIGHT: 68 IN | WEIGHT: 166 LBS

## 2019-02-11 DIAGNOSIS — Z95.5 HISTORY OF CORONARY ARTERY STENT PLACEMENT: ICD-10-CM

## 2019-02-11 DIAGNOSIS — I10 ESSENTIAL HYPERTENSION: ICD-10-CM

## 2019-02-11 DIAGNOSIS — I25.10 CORONARY ARTERY DISEASE INVOLVING NATIVE CORONARY ARTERY OF NATIVE HEART WITHOUT ANGINA PECTORIS: Primary | ICD-10-CM

## 2019-02-11 DIAGNOSIS — E78.2 MIXED HYPERLIPIDEMIA: ICD-10-CM

## 2019-02-11 PROCEDURE — 93000 ELECTROCARDIOGRAM COMPLETE: CPT | Performed by: NURSE PRACTITIONER

## 2019-02-11 PROCEDURE — 99214 OFFICE O/P EST MOD 30 MIN: CPT | Performed by: NURSE PRACTITIONER

## 2019-02-11 RX ORDER — METOPROLOL SUCCINATE 25 MG/1
25 TABLET, EXTENDED RELEASE ORAL DAILY
Qty: 90 TABLET | Refills: 3 | Status: SHIPPED | OUTPATIENT
Start: 2019-02-11 | End: 2019-12-11 | Stop reason: SDUPTHER

## 2019-02-11 NOTE — PROGRESS NOTES
Subjective:     Encounter Date:02/11/2019      Patient ID: Mickey Conde is a 83 y.o. male. He presents today for routine follow up. He has a history of coronary artery disease with remote stenting, hypertension and hyperlipidemia. He denies chest pain, shortness of breath, palpitations, dizziness, syncope, orthopnea, PND, swelling or decreased stamina. He denies any signs of bleeding. He reports that cholesterol is managed by PCP and is reported to be well controlled on statin. He reports routinely monitoring blood pressure at home with readings averaging in the 130s/140s/70s-80s. He states that overall he has been well since his last visit.     Chief Complaint: routine follow up  Coronary Artery Disease   Presents for follow-up visit. Pertinent negatives include no chest pain, chest pressure, chest tightness, dizziness, leg swelling, muscle weakness, palpitations, shortness of breath or weight gain. Risk factors include hyperlipidemia. The symptoms have been stable. Compliance with diet is variable. Compliance with exercise is variable. Compliance with medications is good.   Hypertension   This is a chronic problem. The current episode started more than 1 year ago. The problem is uncontrolled. Pertinent negatives include no anxiety, blurred vision, chest pain, headaches, malaise/fatigue, neck pain, orthopnea, palpitations, peripheral edema, PND, shortness of breath or sweats. Risk factors for coronary artery disease include male gender and dyslipidemia. Current antihypertension treatment includes ACE inhibitors and calcium channel blockers. The current treatment provides moderate improvement. Hypertensive end-organ damage includes CAD/MI.   Hyperlipidemia   This is a chronic problem. The current episode started more than 1 year ago. Pertinent negatives include no chest pain or shortness of breath. Current antihyperlipidemic treatment includes statins. Risk factors for coronary artery disease include male sex,  hypertension and dyslipidemia.       The following portions of the patient's history were reviewed and updated as appropriate: allergies, current medications, past family history, past medical history, past social history, past surgical history and problem list.     Allergies   Allergen Reactions   • Penicillins Rash       Current Outpatient Medications:   •  amLODIPine (NORVASC) 10 MG tablet, Take 1 tablet by mouth Daily., Disp: 90 tablet, Rfl: 3  •  aspirin 81 MG EC tablet, Take 81 mg by mouth Daily., Disp: , Rfl:   •  lisinopril (PRINIVIL,ZESTRIL) 20 MG tablet, Take 1 tablet by mouth Daily., Disp: 90 tablet, Rfl: 3  •  metFORMIN (GLUCOPHAGE) 500 MG tablet, Take 500 mg by mouth Daily., Disp: , Rfl:   •  Misc Natural Products (OSTEO BI-FLEX ADV DOUBLE ST PO), Take  by mouth., Disp: , Rfl:   •  naproxen sodium (ALEVE) 220 MG tablet, Take 220 mg by mouth 2 (Two) Times a Day As Needed., Disp: , Rfl:   •  Omega-3 Fatty Acids (FISH OIL) 1000 MG capsule capsule, Take  by mouth Daily With Breakfast., Disp: , Rfl:   •  simvastatin (ZOCOR) 40 MG tablet, Take 1 tablet by mouth Every Night., Disp: 90 tablet, Rfl: 3  •  metoprolol succinate XL (TOPROL-XL) 25 MG 24 hr tablet, Take 1 tablet by mouth Daily., Disp: 90 tablet, Rfl: 3  Past Medical History:   Diagnosis Date   • Benign essential hypertension    • CAD (coronary artery disease)    • CAD in native artery     Story: lexiscan 1/2012 negative for ischemia   • Enlarged prostate    • Hyperlipemia, mixed     Lipid panel (10/2014) 143/48/81/189   • Hyperlipidemia    • Hypertension    • Myocardial infarction (CMS/HCC)      Past Surgical History:   Procedure Laterality Date   • BACK SURGERY     • CAROTID STENT     • ROTATOR CUFF REPAIR Bilateral    • SHOULDER SURGERY     • TENNIS ELBOW RELEASE       Family History   Problem Relation Age of Onset   • ALS Mother    • Heart attack Father    • Alzheimer's disease Maternal Grandfather    • Heart attack Maternal Grandfather    • Heart  attack Paternal Grandfather      Social History     Socioeconomic History   • Marital status:      Spouse name: Not on file   • Number of children: Not on file   • Years of education: Not on file   • Highest education level: Not on file   Social Needs   • Financial resource strain: Not on file   • Food insecurity - worry: Not on file   • Food insecurity - inability: Not on file   • Transportation needs - medical: Not on file   • Transportation needs - non-medical: Not on file   Occupational History   • Not on file   Tobacco Use   • Smoking status: Former Smoker     Types: Cigarettes     Start date:      Last attempt to quit:      Years since quittin.1   • Smokeless tobacco: Never Used   Substance and Sexual Activity   • Alcohol use: No   • Drug use: No   • Sexual activity: Defer   Other Topics Concern   • Not on file   Social History Narrative   • Not on file         Review of Systems   Constitution: Negative for chills, decreased appetite, fever, weakness, malaise/fatigue, night sweats, weight gain and weight loss.   HENT: Negative for nosebleeds.    Eyes: Negative for blurred vision and visual disturbance.   Cardiovascular: Negative for chest pain, dyspnea on exertion, leg swelling, near-syncope, orthopnea, palpitations, paroxysmal nocturnal dyspnea and syncope.   Respiratory: Negative for chest tightness, cough, hemoptysis, shortness of breath, snoring and wheezing.    Endocrine: Negative for cold intolerance and heat intolerance.   Hematologic/Lymphatic: Does not bruise/bleed easily.   Skin: Negative for rash.   Musculoskeletal: Negative for back pain, falls, muscle weakness and neck pain.   Gastrointestinal: Negative for abdominal pain, change in bowel habit, constipation, diarrhea, dysphagia, heartburn, nausea and vomiting.   Genitourinary: Negative for hematuria.   Neurological: Negative for dizziness, headaches and light-headedness.   Psychiatric/Behavioral: Negative for altered mental  "status.   Allergic/Immunologic: Negative for persistent infections.         ECG 12 Lead  Date/Time: 2/11/2019 2:33 PM  Performed by: Briseyda Dawn APRN  Authorized by: Briseyda Dawn APRN   Comparison: compared with previous ECG from 4/17/2018  Rhythm: sinus rhythm and A-V block  Ectopy: PVCs and unifocal PVCs  Rate: normal  BPM: 80  Conduction: 1st degree  Clinical impression: abnormal ECG        /86   Pulse 80   Ht 172.7 cm (68\")   Wt 75.3 kg (166 lb)   BMI 25.24 kg/m²          Objective:     Physical Exam   Constitutional: He is oriented to person, place, and time. Vital signs are normal. He appears well-developed and well-nourished. No distress.   HENT:   Head: Normocephalic and atraumatic.   Right Ear: External ear normal.   Left Ear: External ear normal.   Eyes: Conjunctivae are normal. Pupils are equal, round, and reactive to light. Right eye exhibits no discharge. Left eye exhibits no discharge.   Neck: Normal range of motion. Neck supple. No JVD present. Carotid bruit is not present. No thyromegaly present.   Cardiovascular: Normal rate, regular rhythm, normal heart sounds and intact distal pulses. PMI is not displaced. Exam reveals no gallop, no friction rub and no decreased pulses.   No murmur heard.  Pulses:       Radial pulses are 2+ on the right side, and 2+ on the left side.        Dorsalis pedis pulses are 2+ on the right side, and 2+ on the left side.        Posterior tibial pulses are 2+ on the right side, and 2+ on the left side.   Pulmonary/Chest: Effort normal and breath sounds normal. No respiratory distress. He has no decreased breath sounds. He has no wheezes. He has no rhonchi. He has no rales. He exhibits no tenderness.   Abdominal: Soft. Bowel sounds are normal. He exhibits no distension. There is no tenderness.   Musculoskeletal: Normal range of motion. He exhibits no edema.   Neurological: He is alert and oriented to person, place, and time.   Skin: Skin is warm and dry. " No rash noted. He is not diaphoretic. No erythema. No pallor.   Psychiatric: He has a normal mood and affect. His behavior is normal. Judgment and thought content normal.   Vitals reviewed.      Lab Review:   Lab Results   Component Value Date    WBC 6.34 04/18/2018    HGB 17.5 04/18/2018    HCT 50.2 04/18/2018    MCV 80.8 (L) 04/18/2018     04/18/2018     Lab Results   Component Value Date    GLUCOSE 106 (H) 04/18/2018    BUN 10 04/18/2018    CREATININE 0.63 04/18/2018    EGFRIFNONA 122 04/18/2018    BCR 15.9 04/18/2018    K 3.3 (L) 04/18/2018    CO2 30.0 04/18/2018    CALCIUM 9.2 04/18/2018    ALBUMIN 4.20 04/18/2018    AST 49 (H) 04/18/2018    ALT 55 (H) 04/18/2018     Lab Results   Component Value Date    CHOL 123 (L) 04/18/2018    CHOL 135 02/02/2018    CHLPL 143 10/29/2014     Lab Results   Component Value Date    TRIG 145 04/18/2018    TRIG 65 02/02/2018    TRIG 189 (H) 10/29/2014     Lab Results   Component Value Date    HDL 40 04/18/2018    HDL 59 02/02/2018    HDL 48 10/29/2014     Lab Results   Component Value Date    LDL 61 04/18/2018    LDL 70 02/02/2018    LDL 81 10/29/2014           Assessment:          Diagnosis Plan   1. Coronary artery disease involving native coronary artery of native heart without angina pectoris  No clinical signs of ischemia.    2. History of coronary artery stent placement  Continues on aspirin. Denies bleeding.    3. Essential hypertension  Elevated. Start metoprolol succinate 25 mg by mouth daily.    4. Mixed hyperlipidemia  Well controlled. Managed by PCP. On statin.           Plan:       1. Start metoprolol succinate 25 mg by mouth daily. Continue other medications as previously prescribed.  2. Report any worsening symptoms.  3. Report any signs of bleeding.  4. Continue heart healthy diet and regular exercise as tolerated.   5. Follow up with PCP for blood pressure and cholesterol management and routine lab work.  6. Follow up with Dr. Padilla in one year, or sooner  if needed.

## 2019-03-12 RX ORDER — SIMVASTATIN 40 MG
TABLET ORAL
Qty: 90 TABLET | Refills: 3 | Status: SHIPPED | OUTPATIENT
Start: 2019-03-12 | End: 2020-03-23 | Stop reason: SDUPTHER

## 2019-03-12 RX ORDER — AMLODIPINE BESYLATE 10 MG/1
TABLET ORAL
Qty: 90 TABLET | Refills: 3 | Status: SHIPPED | OUTPATIENT
Start: 2019-03-12 | End: 2020-03-23 | Stop reason: SDUPTHER

## 2019-03-12 RX ORDER — LISINOPRIL 20 MG/1
TABLET ORAL
Qty: 90 TABLET | Refills: 3 | Status: SHIPPED | OUTPATIENT
Start: 2019-03-12 | End: 2020-03-23 | Stop reason: DRUGHIGH

## 2019-11-13 ENCOUNTER — HOSPITAL ENCOUNTER (OUTPATIENT)
Dept: GENERAL RADIOLOGY | Facility: HOSPITAL | Age: 84
Discharge: HOME | End: 2019-11-13
Admitting: NURSE PRACTITIONER

## 2019-11-13 ENCOUNTER — OFFICE VISIT (OUTPATIENT)
Dept: NEUROSURGERY | Facility: CLINIC | Age: 84
End: 2019-11-13

## 2019-11-13 VITALS
HEIGHT: 67 IN | WEIGHT: 171.2 LBS | SYSTOLIC BLOOD PRESSURE: 132 MMHG | DIASTOLIC BLOOD PRESSURE: 88 MMHG | BODY MASS INDEX: 26.87 KG/M2

## 2019-11-13 DIAGNOSIS — Z87.891 FORMER SMOKER: ICD-10-CM

## 2019-11-13 DIAGNOSIS — M51.37 DEGENERATION OF LUMBAR OR LUMBOSACRAL INTERVERTEBRAL DISC: Primary | ICD-10-CM

## 2019-11-13 DIAGNOSIS — M51.37 DEGENERATION OF LUMBAR OR LUMBOSACRAL INTERVERTEBRAL DISC: ICD-10-CM

## 2019-11-13 PROCEDURE — 72114 X-RAY EXAM L-S SPINE BENDING: CPT

## 2019-11-13 PROCEDURE — 99214 OFFICE O/P EST MOD 30 MIN: CPT | Performed by: NURSE PRACTITIONER

## 2019-11-13 RX ORDER — DICLOFENAC SODIUM 75 MG/1
75 TABLET, DELAYED RELEASE ORAL 2 TIMES DAILY
Qty: 60 TABLET | Refills: 2 | Status: ON HOLD | OUTPATIENT
Start: 2019-11-13 | End: 2020-01-02 | Stop reason: SDUPTHER

## 2019-11-13 RX ORDER — METHYLPREDNISOLONE 4 MG/1
TABLET ORAL
Qty: 21 EACH | Refills: 0 | Status: ON HOLD | OUTPATIENT
Start: 2019-11-13 | End: 2019-12-30

## 2019-11-13 RX ORDER — COVID-19 ANTIGEN TEST
KIT MISCELLANEOUS
COMMUNITY
End: 2019-11-13

## 2019-11-13 RX ORDER — TIZANIDINE 4 MG/1
TABLET ORAL
Refills: 2 | COMMUNITY
Start: 2019-09-16 | End: 2020-01-02 | Stop reason: HOSPADM

## 2019-11-13 NOTE — PROGRESS NOTES
Chief complaint:   Chief Complaint   Patient presents with   • Back Pain     Mickey has been referred here today from Dr. Tamez for follow up for his low back pain with bilateral leg pain, he bring an X-ray of his lumbar with him from [facility] from 2017 for review, no new images.  He has had a previous back surgery in 2010 and has been doing fine, He has not had any recent physical therpay or pain management.        Subjective     HPI: This is a 84-year-old male gentleman who was referred to us by Dr. Ki hutton for back pain.  He states that he did have surgery over 10 years ago and did really well from this procedure.  He is here to be evaluated today.  The patient states that this pain is been going on for over a month.  The pain in his back is constant.  Nothing makes it worse and its better with over-the-counter medication.  He is also complaining of pain that will radiate into the anterior portion of his thighs about mid waist down.  This pain is constant as well.  He states that the pain is so excruciating at times he has difficulty with ambulation.  Denies any bowel or bladder incontinence.  He has not done any physical therapy or pain management injections.  He is tried chiropractic care without any meaningful relief.  He rates the pain on a scale of 0-10 at an 8.  He says it does interfere with his actives of daily living.  He is right-hand dominant.  He is retired.  He is .  Denies any tobacco, alcohol, or illicit drug use.  Current medical problems includes diabetes along with hypertension and hyperlipidemia.    Review of Systems   Musculoskeletal: Positive for back pain.   All other systems reviewed and are negative.       Past Medical History:   Diagnosis Date   • Benign essential hypertension    • CAD (coronary artery disease)    • CAD in native artery     Story: lexiscan 1/2012 negative for ischemia   • Enlarged prostate    • Hyperlipemia, mixed     Lipid panel (10/2014) 143/48/81/189   •  "Hyperlipidemia    • Hypertension    • Myocardial infarction (CMS/HCC)      Past Surgical History:   Procedure Laterality Date   • BACK SURGERY     • CAROTID STENT     • ROTATOR CUFF REPAIR Bilateral    • SHOULDER SURGERY     • TENNIS ELBOW RELEASE       Family History   Problem Relation Age of Onset   • ALS Mother    • Heart attack Father    • Alzheimer's disease Maternal Grandfather    • Heart attack Maternal Grandfather    • Heart attack Paternal Grandfather      Social History     Tobacco Use   • Smoking status: Former Smoker     Types: Cigarettes     Start date:      Last attempt to quit: 1990     Years since quittin.8   • Smokeless tobacco: Never Used   Substance Use Topics   • Alcohol use: No   • Drug use: No       (Not in a hospital admission)  Allergies:  Penicillins    Objective      Vital Signs  /88 (BP Location: Right arm, Patient Position: Sitting)   Ht 170.2 cm (67\")   Wt 77.7 kg (171 lb 3.2 oz)   BMI 26.81 kg/m²     Physical Exam   Constitutional: He is oriented to person, place, and time. He appears well-developed and well-nourished.   HENT:   Head: Normocephalic.   Eyes: Conjunctivae, EOM and lids are normal. Pupils are equal, round, and reactive to light.   Neck: Normal range of motion.   Cardiovascular: Normal rate, regular rhythm and normal heart sounds.   Pulmonary/Chest: Effort normal and breath sounds normal.   Abdominal: Normal appearance.   Musculoskeletal: Normal range of motion.        Lumbar back: He exhibits pain.   Neurological: He is alert and oriented to person, place, and time. He has normal strength and normal reflexes. He displays normal reflexes. No cranial nerve deficit or sensory deficit. GCS eye subscore is 4. GCS verbal subscore is 5. GCS motor subscore is 6.   Skin: Skin is warm.   Psychiatric: He has a normal mood and affect. His speech is normal and behavior is normal. Thought content normal. Cognition and memory are normal.       Results Review: X-ray " report from 2017 shows the patient does have lumbar disc degeneration.  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        Assessment/Plan: At this point I am going to start the patient on diclofenac as well as a Medrol Dosepak to see if this will help with the pain that he is experiencing.  I will also send him for set of x-rays of the lumbar spine to include standing flexion and extension and set him up to have a MRI done of the lumbar spine.  Depending on what the MRI shows will determine if we need to send the patient for dedicated course of physical therapy or need further imaging.  He was told to call us if he had any worsening pain or issues.  We will have him follow-up with Dr. Mcknight after imaging completed.  BMI shows the patient is a overweight.  BMI chart was given to the patient.  He is a non-smoker    Mickey was seen today for back pain.    Diagnoses and all orders for this visit:    Degeneration of lumbar or lumbosacral intervertebral disc  -     XR Spine Lumbar Complete With Flex & Ext; Future  -     MRI Lumbar Spine Without Contrast; Future    Former smoker    BMI 26.0-26.9,adult    Other orders  -     methylPREDNISolone (MEDROL, CHRSI,) 4 MG tablet; Take as directed on package instructions.  -     diclofenac (VOLTAREN) 75 MG EC tablet; Take 1 tablet by mouth 2 (Two) Times a Day.          I discussed the patients findings and my recommendations with patient    Justni Cortez, VIVIEN  11/13/19  11:36 AM

## 2019-11-13 NOTE — PATIENT INSTRUCTIONS

## 2019-11-19 ENCOUNTER — HOSPITAL ENCOUNTER (OUTPATIENT)
Dept: MRI IMAGING | Facility: HOSPITAL | Age: 84
Discharge: HOME | End: 2019-11-19
Admitting: NURSE PRACTITIONER

## 2019-11-19 DIAGNOSIS — M51.37 DEGENERATION OF LUMBAR OR LUMBOSACRAL INTERVERTEBRAL DISC: ICD-10-CM

## 2019-11-19 PROCEDURE — 72148 MRI LUMBAR SPINE W/O DYE: CPT

## 2019-11-25 ENCOUNTER — TELEPHONE (OUTPATIENT)
Dept: NEUROSURGERY | Facility: CLINIC | Age: 84
End: 2019-11-25

## 2019-11-25 DIAGNOSIS — M51.37 DEGENERATION OF LUMBAR OR LUMBOSACRAL INTERVERTEBRAL DISC: Primary | ICD-10-CM

## 2019-11-25 NOTE — TELEPHONE ENCOUNTER
Mickey has recently had an MRI of the lumbar and is calling for the results and what are the recommendations for treatment.  Appointment in February with Dr. Mcknight for follow up.    Please review MRI and advise patient.

## 2019-11-27 ENCOUNTER — TELEPHONE (OUTPATIENT)
Dept: NEUROSURGERY | Facility: CLINIC | Age: 84
End: 2019-11-27

## 2019-11-27 NOTE — TELEPHONE ENCOUNTER
Called to give the patient the results of the MRI which did show degeneration and disc displacements.  At this point I would recommend physical therapy as the next step and then follow-up with Dr. Mcknight to see if anything surgical would be of a benefit to the patient

## 2019-11-27 NOTE — TELEPHONE ENCOUNTER
Justin has recommended some physical therapy for Mickey Conde to try until he can see Dr. Mcknight in February, he has [place] [facility] [place]

## 2019-12-12 RX ORDER — METOPROLOL SUCCINATE 25 MG/1
TABLET, EXTENDED RELEASE ORAL
Qty: 90 TABLET | Refills: 3 | Status: SHIPPED | OUTPATIENT
Start: 2019-12-12 | End: 2020-02-26

## 2019-12-30 ENCOUNTER — PREP FOR SURGERY (OUTPATIENT)
Dept: OTHER | Facility: HOSPITAL | Age: 84
End: 2019-12-30

## 2019-12-30 ENCOUNTER — APPOINTMENT (OUTPATIENT)
Dept: GENERAL RADIOLOGY | Facility: HOSPITAL | Age: 84
End: 2019-12-30

## 2019-12-30 ENCOUNTER — HOSPITAL ENCOUNTER (INPATIENT)
Facility: HOSPITAL | Age: 84
LOS: 3 days | Discharge: HOME | End: 2020-01-02
Attending: EMERGENCY MEDICINE | Admitting: NEUROLOGICAL SURGERY

## 2019-12-30 ENCOUNTER — APPOINTMENT (OUTPATIENT)
Dept: MRI IMAGING | Facility: HOSPITAL | Age: 84
End: 2019-12-30

## 2019-12-30 DIAGNOSIS — Z74.09 IMPAIRED FUNCTIONAL MOBILITY AND ACTIVITY TOLERANCE: ICD-10-CM

## 2019-12-30 DIAGNOSIS — M48.062 SPINAL STENOSIS, LUMBAR REGION, WITH NEUROGENIC CLAUDICATION: Primary | ICD-10-CM

## 2019-12-30 DIAGNOSIS — M48.062 SPINAL STENOSIS OF LUMBAR REGION WITH NEUROGENIC CLAUDICATION: Primary | ICD-10-CM

## 2019-12-30 DIAGNOSIS — G89.18 POST-OP PAIN: ICD-10-CM

## 2019-12-30 DIAGNOSIS — M48.062 SPINAL STENOSIS, LUMBAR REGION, WITH NEUROGENIC CLAUDICATION: ICD-10-CM

## 2019-12-30 DIAGNOSIS — Z78.9 DECREASED ACTIVITIES OF DAILY LIVING (ADL): ICD-10-CM

## 2019-12-30 LAB
ALBUMIN SERPL-MCNC: 4.4 G/DL (ref 3.5–5.2)
ALBUMIN/GLOB SERPL: 1.9 G/DL
ALP SERPL-CCNC: 63 U/L (ref 39–117)
ALT SERPL W P-5'-P-CCNC: 16 U/L (ref 1–41)
ANION GAP SERPL CALCULATED.3IONS-SCNC: 11 MMOL/L (ref 5–15)
APTT PPP: 24.8 SECONDS (ref 24.1–35)
AST SERPL-CCNC: 13 U/L (ref 1–40)
BASOPHILS # BLD AUTO: 0.05 10*3/MM3 (ref 0–0.2)
BASOPHILS NFR BLD AUTO: 0.5 % (ref 0–1.5)
BILIRUB SERPL-MCNC: 0.9 MG/DL (ref 0.2–1.2)
BUN BLD-MCNC: 13 MG/DL (ref 8–23)
BUN/CREAT SERPL: 24.5 (ref 7–25)
CALCIUM SPEC-SCNC: 9 MG/DL (ref 8.6–10.5)
CHLORIDE SERPL-SCNC: 102 MMOL/L (ref 98–107)
CO2 SERPL-SCNC: 29 MMOL/L (ref 22–29)
CREAT BLD-MCNC: 0.53 MG/DL (ref 0.76–1.27)
CRP SERPL-MCNC: 0.05 MG/DL (ref 0–0.5)
DEPRECATED RDW RBC AUTO: 40.7 FL (ref 37–54)
EOSINOPHIL # BLD AUTO: 0.17 10*3/MM3 (ref 0–0.4)
EOSINOPHIL NFR BLD AUTO: 1.7 % (ref 0.3–6.2)
ERYTHROCYTE [DISTWIDTH] IN BLOOD BY AUTOMATED COUNT: 13.4 % (ref 12.3–15.4)
ERYTHROCYTE [SEDIMENTATION RATE] IN BLOOD: <1 MM/HR (ref 0–15)
GFR SERPL CREATININE-BSD FRML MDRD: 148 ML/MIN/1.73
GLOBULIN UR ELPH-MCNC: 2.3 GM/DL
GLUCOSE BLD-MCNC: 117 MG/DL (ref 65–99)
HCT VFR BLD AUTO: 44.5 % (ref 37.5–51)
HGB BLD-MCNC: 15.4 G/DL (ref 13–17.7)
IMM GRANULOCYTES # BLD AUTO: 0.07 10*3/MM3 (ref 0–0.05)
IMM GRANULOCYTES NFR BLD AUTO: 0.7 % (ref 0–0.5)
INR PPP: 0.92 (ref 0.91–1.09)
LYMPHOCYTES # BLD AUTO: 1.67 10*3/MM3 (ref 0.7–3.1)
LYMPHOCYTES NFR BLD AUTO: 17.1 % (ref 19.6–45.3)
MCH RBC QN AUTO: 29.1 PG (ref 26.6–33)
MCHC RBC AUTO-ENTMCNC: 34.6 G/DL (ref 31.5–35.7)
MCV RBC AUTO: 84.1 FL (ref 79–97)
MONOCYTES # BLD AUTO: 1.01 10*3/MM3 (ref 0.1–0.9)
MONOCYTES NFR BLD AUTO: 10.3 % (ref 5–12)
NEUTROPHILS # BLD AUTO: 6.79 10*3/MM3 (ref 1.7–7)
NEUTROPHILS NFR BLD AUTO: 69.7 % (ref 42.7–76)
NRBC BLD AUTO-RTO: 0 /100 WBC (ref 0–0.2)
PLATELET # BLD AUTO: 235 10*3/MM3 (ref 140–450)
PMV BLD AUTO: 9.6 FL (ref 6–12)
POTASSIUM BLD-SCNC: 3.4 MMOL/L (ref 3.5–5.2)
PROT SERPL-MCNC: 6.7 G/DL (ref 6–8.5)
PROTHROMBIN TIME: 12.6 SECONDS (ref 11.9–14.6)
RBC # BLD AUTO: 5.29 10*6/MM3 (ref 4.14–5.8)
SODIUM BLD-SCNC: 142 MMOL/L (ref 136–145)
WBC NRBC COR # BLD: 9.76 10*3/MM3 (ref 3.4–10.8)

## 2019-12-30 PROCEDURE — 99284 EMERGENCY DEPT VISIT MOD MDM: CPT

## 2019-12-30 PROCEDURE — 85730 THROMBOPLASTIN TIME PARTIAL: CPT | Performed by: NURSE PRACTITIONER

## 2019-12-30 PROCEDURE — 85610 PROTHROMBIN TIME: CPT | Performed by: NURSE PRACTITIONER

## 2019-12-30 PROCEDURE — 94760 N-INVAS EAR/PLS OXIMETRY 1: CPT

## 2019-12-30 PROCEDURE — 25010000002 DEXAMETHASONE PER 1 MG: Performed by: EMERGENCY MEDICINE

## 2019-12-30 PROCEDURE — 94799 UNLISTED PULMONARY SVC/PX: CPT

## 2019-12-30 PROCEDURE — 99222 1ST HOSP IP/OBS MODERATE 55: CPT | Performed by: NURSE PRACTITIONER

## 2019-12-30 PROCEDURE — 72148 MRI LUMBAR SPINE W/O DYE: CPT

## 2019-12-30 PROCEDURE — 85651 RBC SED RATE NONAUTOMATED: CPT | Performed by: EMERGENCY MEDICINE

## 2019-12-30 PROCEDURE — 87040 BLOOD CULTURE FOR BACTERIA: CPT | Performed by: EMERGENCY MEDICINE

## 2019-12-30 PROCEDURE — 85025 COMPLETE CBC W/AUTO DIFF WBC: CPT | Performed by: EMERGENCY MEDICINE

## 2019-12-30 PROCEDURE — 80053 COMPREHEN METABOLIC PANEL: CPT | Performed by: EMERGENCY MEDICINE

## 2019-12-30 PROCEDURE — 93005 ELECTROCARDIOGRAM TRACING: CPT | Performed by: NURSE PRACTITIONER

## 2019-12-30 PROCEDURE — 71045 X-RAY EXAM CHEST 1 VIEW: CPT

## 2019-12-30 PROCEDURE — 86140 C-REACTIVE PROTEIN: CPT | Performed by: EMERGENCY MEDICINE

## 2019-12-30 RX ORDER — DOCUSATE SODIUM 100 MG/1
100 CAPSULE, LIQUID FILLED ORAL DAILY
Status: DISCONTINUED | OUTPATIENT
Start: 2019-12-30 | End: 2020-01-02 | Stop reason: HOSPADM

## 2019-12-30 RX ORDER — ATORVASTATIN CALCIUM 10 MG/1
20 TABLET, FILM COATED ORAL NIGHTLY
Status: DISCONTINUED | OUTPATIENT
Start: 2019-12-30 | End: 2020-01-02 | Stop reason: HOSPADM

## 2019-12-30 RX ORDER — METOPROLOL SUCCINATE 25 MG/1
25 TABLET, EXTENDED RELEASE ORAL DAILY
Status: DISCONTINUED | OUTPATIENT
Start: 2019-12-30 | End: 2020-01-02 | Stop reason: HOSPADM

## 2019-12-30 RX ORDER — SODIUM CHLORIDE 0.9 % (FLUSH) 0.9 %
10 SYRINGE (ML) INJECTION EVERY 12 HOURS SCHEDULED
Status: DISCONTINUED | OUTPATIENT
Start: 2019-12-30 | End: 2019-12-31

## 2019-12-30 RX ORDER — ONDANSETRON 4 MG/1
4 TABLET, FILM COATED ORAL EVERY 6 HOURS PRN
Status: DISCONTINUED | OUTPATIENT
Start: 2019-12-30 | End: 2020-01-02 | Stop reason: HOSPADM

## 2019-12-30 RX ORDER — AMLODIPINE BESYLATE 10 MG/1
10 TABLET ORAL DAILY
Status: DISCONTINUED | OUTPATIENT
Start: 2019-12-30 | End: 2020-01-02 | Stop reason: HOSPADM

## 2019-12-30 RX ORDER — FAMOTIDINE 20 MG/1
40 TABLET, FILM COATED ORAL DAILY
Status: DISCONTINUED | OUTPATIENT
Start: 2019-12-30 | End: 2020-01-02 | Stop reason: HOSPADM

## 2019-12-30 RX ORDER — OXYCODONE AND ACETAMINOPHEN 10; 325 MG/1; MG/1
1 TABLET ORAL EVERY 4 HOURS PRN
Status: DISCONTINUED | OUTPATIENT
Start: 2019-12-30 | End: 2019-12-31 | Stop reason: SDUPTHER

## 2019-12-30 RX ORDER — DEXAMETHASONE SODIUM PHOSPHATE 10 MG/ML
10 INJECTION INTRAMUSCULAR; INTRAVENOUS ONCE
Status: COMPLETED | OUTPATIENT
Start: 2019-12-30 | End: 2019-12-30

## 2019-12-30 RX ORDER — ONDANSETRON 2 MG/ML
4 INJECTION INTRAMUSCULAR; INTRAVENOUS EVERY 6 HOURS PRN
Status: DISCONTINUED | OUTPATIENT
Start: 2019-12-30 | End: 2020-01-02 | Stop reason: HOSPADM

## 2019-12-30 RX ORDER — HYDROCODONE BITARTRATE AND ACETAMINOPHEN 7.5; 325 MG/1; MG/1
1 TABLET ORAL EVERY 4 HOURS PRN
Status: DISCONTINUED | OUTPATIENT
Start: 2019-12-30 | End: 2020-01-02 | Stop reason: HOSPADM

## 2019-12-30 RX ORDER — SODIUM CHLORIDE 0.9 % (FLUSH) 0.9 %
10 SYRINGE (ML) INJECTION AS NEEDED
Status: DISCONTINUED | OUTPATIENT
Start: 2019-12-30 | End: 2019-12-31

## 2019-12-30 RX ORDER — LISINOPRIL 20 MG/1
20 TABLET ORAL DAILY
Status: DISCONTINUED | OUTPATIENT
Start: 2019-12-30 | End: 2020-01-02 | Stop reason: HOSPADM

## 2019-12-30 RX ORDER — AMOXICILLIN 250 MG
2 CAPSULE ORAL NIGHTLY
Status: DISCONTINUED | OUTPATIENT
Start: 2019-12-30 | End: 2020-01-02 | Stop reason: HOSPADM

## 2019-12-30 RX ADMIN — FAMOTIDINE 40 MG: 20 TABLET, FILM COATED ORAL at 17:54

## 2019-12-30 RX ADMIN — SODIUM CHLORIDE, PRESERVATIVE FREE 10 ML: 5 INJECTION INTRAVENOUS at 20:52

## 2019-12-30 RX ADMIN — HYDROMORPHONE HYDROCHLORIDE 1 MG: 1 INJECTION, SOLUTION INTRAMUSCULAR; INTRAVENOUS; SUBCUTANEOUS at 10:43

## 2019-12-30 RX ADMIN — SENNOSIDES AND DOCUSATE SODIUM 2 TABLET: 8.6; 5 TABLET ORAL at 20:52

## 2019-12-30 RX ADMIN — DEXAMETHASONE SODIUM PHOSPHATE 10 MG: 10 INJECTION INTRAMUSCULAR; INTRAVENOUS at 10:43

## 2019-12-31 ENCOUNTER — ANESTHESIA (OUTPATIENT)
Dept: PERIOP | Facility: HOSPITAL | Age: 84
End: 2019-12-31

## 2019-12-31 ENCOUNTER — APPOINTMENT (OUTPATIENT)
Dept: GENERAL RADIOLOGY | Facility: HOSPITAL | Age: 84
End: 2019-12-31

## 2019-12-31 ENCOUNTER — ANESTHESIA EVENT (OUTPATIENT)
Dept: PERIOP | Facility: HOSPITAL | Age: 84
End: 2019-12-31

## 2019-12-31 LAB
ABO GROUP BLD: NORMAL
BLD GP AB SCN SERPL QL: NEGATIVE
RH BLD: POSITIVE
T&S EXPIRATION DATE: NORMAL

## 2019-12-31 PROCEDURE — 63048 LAM FACETEC &FORAMOT EA ADDL: CPT | Performed by: NEUROLOGICAL SURGERY

## 2019-12-31 PROCEDURE — 00NY0ZZ RELEASE LUMBAR SPINAL CORD, OPEN APPROACH: ICD-10-PCS | Performed by: NEUROLOGICAL SURGERY

## 2019-12-31 PROCEDURE — 93010 ELECTROCARDIOGRAM REPORT: CPT | Performed by: INTERNAL MEDICINE

## 2019-12-31 PROCEDURE — 86901 BLOOD TYPING SEROLOGIC RH(D): CPT | Performed by: ANESTHESIOLOGY

## 2019-12-31 PROCEDURE — 72100 X-RAY EXAM L-S SPINE 2/3 VWS: CPT

## 2019-12-31 PROCEDURE — 25010000002 LIDOCAINE PER 10 MG: Performed by: NURSE ANESTHETIST, CERTIFIED REGISTERED

## 2019-12-31 PROCEDURE — 76000 FLUOROSCOPY <1 HR PHYS/QHP: CPT

## 2019-12-31 PROCEDURE — 25010000002 VANCOMYCIN 1 G RECONSTITUTED SOLUTION: Performed by: NEUROLOGICAL SURGERY

## 2019-12-31 PROCEDURE — 25010000002 FENTANYL CITRATE (PF) 100 MCG/2ML SOLUTION: Performed by: NURSE ANESTHETIST, CERTIFIED REGISTERED

## 2019-12-31 PROCEDURE — 25010000002 VANCOMYCIN: Performed by: NURSE PRACTITIONER

## 2019-12-31 PROCEDURE — 97166 OT EVAL MOD COMPLEX 45 MIN: CPT

## 2019-12-31 PROCEDURE — 86900 BLOOD TYPING SEROLOGIC ABO: CPT | Performed by: ANESTHESIOLOGY

## 2019-12-31 PROCEDURE — 25010000002 ONDANSETRON PER 1 MG: Performed by: NURSE ANESTHETIST, CERTIFIED REGISTERED

## 2019-12-31 PROCEDURE — 99024 POSTOP FOLLOW-UP VISIT: CPT | Performed by: NURSE PRACTITIONER

## 2019-12-31 PROCEDURE — 25010000002 PROPOFOL 10 MG/ML EMULSION: Performed by: NURSE ANESTHETIST, CERTIFIED REGISTERED

## 2019-12-31 PROCEDURE — 97161 PT EVAL LOW COMPLEX 20 MIN: CPT | Performed by: PHYSICAL THERAPIST

## 2019-12-31 PROCEDURE — 25010000002 DEXAMETHASONE PER 1 MG: Performed by: NURSE ANESTHETIST, CERTIFIED REGISTERED

## 2019-12-31 PROCEDURE — 63047 LAM FACETEC & FORAMOT LUMBAR: CPT | Performed by: NEUROLOGICAL SURGERY

## 2019-12-31 PROCEDURE — 86850 RBC ANTIBODY SCREEN: CPT | Performed by: ANESTHESIOLOGY

## 2019-12-31 RX ORDER — ONDANSETRON 2 MG/ML
INJECTION INTRAMUSCULAR; INTRAVENOUS AS NEEDED
Status: DISCONTINUED | OUTPATIENT
Start: 2019-12-31 | End: 2019-12-31 | Stop reason: SURG

## 2019-12-31 RX ORDER — KETAMINE HYDROCHLORIDE 50 MG/ML
INJECTION, SOLUTION, CONCENTRATE INTRAMUSCULAR; INTRAVENOUS AS NEEDED
Status: DISCONTINUED | OUTPATIENT
Start: 2019-12-31 | End: 2019-12-31 | Stop reason: SURG

## 2019-12-31 RX ORDER — SODIUM CHLORIDE, SODIUM LACTATE, POTASSIUM CHLORIDE, CALCIUM CHLORIDE 600; 310; 30; 20 MG/100ML; MG/100ML; MG/100ML; MG/100ML
100 INJECTION, SOLUTION INTRAVENOUS CONTINUOUS
Status: DISCONTINUED | OUTPATIENT
Start: 2019-12-31 | End: 2019-12-31

## 2019-12-31 RX ORDER — PROPOFOL 10 MG/ML
VIAL (ML) INTRAVENOUS AS NEEDED
Status: DISCONTINUED | OUTPATIENT
Start: 2019-12-31 | End: 2019-12-31 | Stop reason: SURG

## 2019-12-31 RX ORDER — MORPHINE SULFATE 2 MG/ML
2 INJECTION, SOLUTION INTRAMUSCULAR; INTRAVENOUS
Status: DISCONTINUED | OUTPATIENT
Start: 2019-12-31 | End: 2019-12-31 | Stop reason: HOSPADM

## 2019-12-31 RX ORDER — LABETALOL HYDROCHLORIDE 5 MG/ML
5 INJECTION, SOLUTION INTRAVENOUS
Status: DISCONTINUED | OUTPATIENT
Start: 2019-12-31 | End: 2019-12-31 | Stop reason: HOSPADM

## 2019-12-31 RX ORDER — SODIUM CHLORIDE 0.9 % (FLUSH) 0.9 %
3 SYRINGE (ML) INJECTION EVERY 12 HOURS SCHEDULED
Status: DISCONTINUED | OUTPATIENT
Start: 2019-12-31 | End: 2019-12-31 | Stop reason: HOSPADM

## 2019-12-31 RX ORDER — ACETAMINOPHEN 500 MG
1000 TABLET ORAL ONCE
Status: COMPLETED | OUTPATIENT
Start: 2019-12-31 | End: 2019-12-31

## 2019-12-31 RX ORDER — LIDOCAINE HYDROCHLORIDE ANHYDROUS AND DEXTROSE MONOHYDRATE 5; 400 G/100ML; MG/100ML
INJECTION, SOLUTION INTRAVENOUS CONTINUOUS PRN
Status: DISCONTINUED | OUTPATIENT
Start: 2019-12-31 | End: 2019-12-31 | Stop reason: SURG

## 2019-12-31 RX ORDER — FENTANYL CITRATE 50 UG/ML
INJECTION, SOLUTION INTRAMUSCULAR; INTRAVENOUS AS NEEDED
Status: DISCONTINUED | OUTPATIENT
Start: 2019-12-31 | End: 2019-12-31 | Stop reason: SURG

## 2019-12-31 RX ORDER — METOCLOPRAMIDE HYDROCHLORIDE 5 MG/ML
5 INJECTION INTRAMUSCULAR; INTRAVENOUS
Status: DISCONTINUED | OUTPATIENT
Start: 2019-12-31 | End: 2019-12-31 | Stop reason: HOSPADM

## 2019-12-31 RX ORDER — VANCOMYCIN HYDROCHLORIDE 1 G/20ML
INJECTION, POWDER, LYOPHILIZED, FOR SOLUTION INTRAVENOUS AS NEEDED
Status: DISCONTINUED | OUTPATIENT
Start: 2019-12-31 | End: 2019-12-31 | Stop reason: HOSPADM

## 2019-12-31 RX ORDER — EPHEDRINE SULFATE 50 MG/ML
INJECTION INTRAVENOUS AS NEEDED
Status: DISCONTINUED | OUTPATIENT
Start: 2019-12-31 | End: 2019-12-31 | Stop reason: SURG

## 2019-12-31 RX ORDER — NALOXONE HCL 0.4 MG/ML
0.04 VIAL (ML) INJECTION AS NEEDED
Status: DISCONTINUED | OUTPATIENT
Start: 2019-12-31 | End: 2019-12-31 | Stop reason: HOSPADM

## 2019-12-31 RX ORDER — SODIUM CHLORIDE, SODIUM LACTATE, POTASSIUM CHLORIDE, CALCIUM CHLORIDE 600; 310; 30; 20 MG/100ML; MG/100ML; MG/100ML; MG/100ML
1000 INJECTION, SOLUTION INTRAVENOUS CONTINUOUS
Status: DISCONTINUED | OUTPATIENT
Start: 2019-12-31 | End: 2019-12-31

## 2019-12-31 RX ORDER — FAMOTIDINE 10 MG/ML
20 INJECTION, SOLUTION INTRAVENOUS
Status: COMPLETED | OUTPATIENT
Start: 2019-12-31 | End: 2019-12-31

## 2019-12-31 RX ORDER — DEXAMETHASONE SODIUM PHOSPHATE 4 MG/ML
INJECTION, SOLUTION INTRA-ARTICULAR; INTRALESIONAL; INTRAMUSCULAR; INTRAVENOUS; SOFT TISSUE AS NEEDED
Status: DISCONTINUED | OUTPATIENT
Start: 2019-12-31 | End: 2019-12-31 | Stop reason: SURG

## 2019-12-31 RX ORDER — DIAZEPAM 5 MG/1
5 TABLET ORAL EVERY 6 HOURS PRN
Status: DISCONTINUED | OUTPATIENT
Start: 2019-12-31 | End: 2020-01-02 | Stop reason: HOSPADM

## 2019-12-31 RX ORDER — HYDRALAZINE HYDROCHLORIDE 20 MG/ML
5 INJECTION INTRAMUSCULAR; INTRAVENOUS
Status: DISCONTINUED | OUTPATIENT
Start: 2019-12-31 | End: 2019-12-31 | Stop reason: HOSPADM

## 2019-12-31 RX ORDER — IPRATROPIUM BROMIDE AND ALBUTEROL SULFATE 2.5; .5 MG/3ML; MG/3ML
3 SOLUTION RESPIRATORY (INHALATION) ONCE AS NEEDED
Status: DISCONTINUED | OUTPATIENT
Start: 2019-12-31 | End: 2019-12-31 | Stop reason: HOSPADM

## 2019-12-31 RX ORDER — SODIUM CHLORIDE 0.9 % (FLUSH) 0.9 %
3-10 SYRINGE (ML) INJECTION AS NEEDED
Status: DISCONTINUED | OUTPATIENT
Start: 2019-12-31 | End: 2019-12-31 | Stop reason: HOSPADM

## 2019-12-31 RX ORDER — FENTANYL CITRATE 50 UG/ML
25 INJECTION, SOLUTION INTRAMUSCULAR; INTRAVENOUS AS NEEDED
Status: DISCONTINUED | OUTPATIENT
Start: 2019-12-31 | End: 2019-12-31 | Stop reason: HOSPADM

## 2019-12-31 RX ORDER — OXYCODONE AND ACETAMINOPHEN 7.5; 325 MG/1; MG/1
2 TABLET ORAL EVERY 4 HOURS PRN
Status: DISCONTINUED | OUTPATIENT
Start: 2019-12-31 | End: 2020-01-02 | Stop reason: HOSPADM

## 2019-12-31 RX ORDER — MAGNESIUM HYDROXIDE 1200 MG/15ML
LIQUID ORAL AS NEEDED
Status: DISCONTINUED | OUTPATIENT
Start: 2019-12-31 | End: 2019-12-31 | Stop reason: HOSPADM

## 2019-12-31 RX ORDER — FLUMAZENIL 0.1 MG/ML
0.2 INJECTION INTRAVENOUS AS NEEDED
Status: DISCONTINUED | OUTPATIENT
Start: 2019-12-31 | End: 2019-12-31 | Stop reason: HOSPADM

## 2019-12-31 RX ORDER — ROCURONIUM BROMIDE 10 MG/ML
INJECTION, SOLUTION INTRAVENOUS AS NEEDED
Status: DISCONTINUED | OUTPATIENT
Start: 2019-12-31 | End: 2019-12-31 | Stop reason: SURG

## 2019-12-31 RX ORDER — OXYCODONE AND ACETAMINOPHEN 10; 325 MG/1; MG/1
1 TABLET ORAL ONCE AS NEEDED
Status: DISCONTINUED | OUTPATIENT
Start: 2019-12-31 | End: 2019-12-31 | Stop reason: HOSPADM

## 2019-12-31 RX ORDER — SODIUM CHLORIDE 0.9 % (FLUSH) 0.9 %
10 SYRINGE (ML) INJECTION AS NEEDED
Status: DISCONTINUED | OUTPATIENT
Start: 2019-12-31 | End: 2019-12-31 | Stop reason: HOSPADM

## 2019-12-31 RX ORDER — SODIUM CHLORIDE 0.9 % (FLUSH) 0.9 %
3 SYRINGE (ML) INJECTION AS NEEDED
Status: DISCONTINUED | OUTPATIENT
Start: 2019-12-31 | End: 2019-12-31 | Stop reason: HOSPADM

## 2019-12-31 RX ORDER — ONDANSETRON 2 MG/ML
4 INJECTION INTRAMUSCULAR; INTRAVENOUS AS NEEDED
Status: DISCONTINUED | OUTPATIENT
Start: 2019-12-31 | End: 2019-12-31 | Stop reason: HOSPADM

## 2019-12-31 RX ADMIN — METOPROLOL SUCCINATE 25 MG: 25 TABLET, EXTENDED RELEASE ORAL at 05:36

## 2019-12-31 RX ADMIN — VASOPRESSIN 0.5 ML: 20 INJECTION INTRAVENOUS at 08:52

## 2019-12-31 RX ADMIN — ACETAMINOPHEN 1000 MG: 500 TABLET, FILM COATED ORAL at 07:06

## 2019-12-31 RX ADMIN — SENNOSIDES AND DOCUSATE SODIUM 2 TABLET: 8.6; 5 TABLET ORAL at 20:18

## 2019-12-31 RX ADMIN — PROPOFOL 80 MG: 10 INJECTION, EMULSION INTRAVENOUS at 07:48

## 2019-12-31 RX ADMIN — SODIUM CHLORIDE, POTASSIUM CHLORIDE, SODIUM LACTATE AND CALCIUM CHLORIDE 1000 ML: 600; 310; 30; 20 INJECTION, SOLUTION INTRAVENOUS at 07:04

## 2019-12-31 RX ADMIN — FAMOTIDINE 20 MG: 10 INJECTION, SOLUTION INTRAVENOUS at 07:07

## 2019-12-31 RX ADMIN — VASOPRESSIN 0.5 ML: 20 INJECTION INTRAVENOUS at 08:36

## 2019-12-31 RX ADMIN — EPHEDRINE SULFATE 15 MG: 50 INJECTION INTRAVENOUS at 09:07

## 2019-12-31 RX ADMIN — FENTANYL CITRATE 100 MCG: 50 INJECTION, SOLUTION INTRAMUSCULAR; INTRAVENOUS at 09:20

## 2019-12-31 RX ADMIN — VANCOMYCIN HYDROCHLORIDE 1000 MG: 1 INJECTION, POWDER, LYOPHILIZED, FOR SOLUTION INTRAVENOUS at 07:10

## 2019-12-31 RX ADMIN — FENTANYL CITRATE 100 MCG: 50 INJECTION, SOLUTION INTRAMUSCULAR; INTRAVENOUS at 07:48

## 2019-12-31 RX ADMIN — DEXAMETHASONE SODIUM PHOSPHATE 4 MG: 4 INJECTION, SOLUTION INTRAMUSCULAR; INTRAVENOUS at 09:46

## 2019-12-31 RX ADMIN — VASOPRESSIN 0.5 ML: 20 INJECTION INTRAVENOUS at 08:58

## 2019-12-31 RX ADMIN — ROCURONIUM BROMIDE 40 MG: 10 INJECTION INTRAVENOUS at 07:48

## 2019-12-31 RX ADMIN — ONDANSETRON HYDROCHLORIDE 4 MG: 2 SOLUTION INTRAMUSCULAR; INTRAVENOUS at 09:46

## 2019-12-31 RX ADMIN — KETAMINE HYDROCHLORIDE 50 MG: 50 INJECTION, SOLUTION INTRAMUSCULAR; INTRAVENOUS at 09:41

## 2019-12-31 RX ADMIN — KETAMINE HYDROCHLORIDE 50 MG: 50 INJECTION, SOLUTION INTRAMUSCULAR; INTRAVENOUS at 07:48

## 2019-12-31 RX ADMIN — VASOPRESSIN 0.5 ML: 20 INJECTION INTRAVENOUS at 08:25

## 2019-12-31 RX ADMIN — LIDOCAINE HYDROCHLORIDE 2 MG/KG/HR: 4 INJECTION, SOLUTION INTRAVENOUS at 07:48

## 2019-12-31 RX ADMIN — ATORVASTATIN CALCIUM 20 MG: 10 TABLET, FILM COATED ORAL at 20:18

## 2019-12-31 RX ADMIN — SODIUM CHLORIDE, POTASSIUM CHLORIDE, SODIUM LACTATE AND CALCIUM CHLORIDE 100 ML/HR: 600; 310; 30; 20 INJECTION, SOLUTION INTRAVENOUS at 06:58

## 2019-12-31 NOTE — ANESTHESIA PREPROCEDURE EVALUATION
Anesthesia Evaluation     Patient summary reviewed and Nursing notes reviewed   no history of anesthetic complications:  NPO Solid Status: > 8 hours  NPO Liquid Status: > 8 hours           Airway   Mallampati: I  TM distance: >3 FB  Neck ROM: full  No difficulty expected  Dental      Pulmonary    (+) a smoker (quit >20 years ago) Former,   (-) asthma  Cardiovascular   Exercise tolerance: poor (<4 METS)    Patient on routine beta blocker and Beta blocker given within 24 hours of surgery    (+) hypertension, past MI , CAD, cardiac stents (pt reports 3-4 heart stents placed in 90s) more than 12 months ago hyperlipidemia,     ROS comment: Admitted in 2018 with chest pain, was seen by Dr Gill. Troponings were negative. Stress echo equivacol.     Neuro/Psych  (-) seizures, TIA, CVA  GI/Hepatic/Renal/Endo    (+)   diabetes mellitus type 2,   (-) liver disease, no renal disease    Musculoskeletal         ROS comment: Spinal stenosis L3-4, sciatica, pt reports inability to walk x 3-4 months  Abdominal    Substance History      OB/GYN          Other   arthritis,          Phys Exam Other: Two lower teeth remaining, none loose                Anesthesia Plan    ASA 3     general     intravenous induction     Anesthetic plan, all risks, benefits, and alternatives have been provided, discussed and informed consent has been obtained with: patient.

## 2019-12-31 NOTE — ANESTHESIA PROCEDURE NOTES
Airway  Date/Time: 12/31/2019 7:50 AM  Airway not difficult    General Information and Staff    Patient location during procedure: OR  CRNA: Oseas Ware CRNA    Indications and Patient Condition  Indications for airway management: airway protection    Preoxygenated: yes  Mask difficulty assessment: 0 - not attempted    Final Airway Details  Final airway type: endotracheal airway      Successful airway: ETT  Cuffed: yes   Successful intubation technique: direct laryngoscopy  Endotracheal tube insertion site: oral  Blade: Nikki  Blade size: 3  ETT size (mm): 7.0  Cormack-Lehane Classification: grade I - full view of glottis  Placement verified by: chest auscultation and capnometry   Cuff volume (mL): 6  Measured from: lips  ETT/EBT  to lips (cm): 22  Number of attempts at approach: 1  Assessment: lips, teeth, and gum same as pre-op and atraumatic intubation    Additional Comments  ATRAUMATIC INTUBATION

## 2020-01-01 PROCEDURE — 97116 GAIT TRAINING THERAPY: CPT

## 2020-01-01 PROCEDURE — 99024 POSTOP FOLLOW-UP VISIT: CPT | Performed by: NURSE PRACTITIONER

## 2020-01-01 PROCEDURE — 97530 THERAPEUTIC ACTIVITIES: CPT

## 2020-01-01 RX ORDER — BISACODYL 10 MG
10 SUPPOSITORY, RECTAL RECTAL DAILY
Status: DISCONTINUED | OUTPATIENT
Start: 2020-01-01 | End: 2020-01-02 | Stop reason: HOSPADM

## 2020-01-01 RX ADMIN — HYDROCODONE BITARTRATE AND ACETAMINOPHEN 1 TABLET: 7.5; 325 TABLET ORAL at 08:43

## 2020-01-01 RX ADMIN — LISINOPRIL 20 MG: 20 TABLET ORAL at 08:43

## 2020-01-01 RX ADMIN — AMLODIPINE BESYLATE 10 MG: 10 TABLET ORAL at 08:43

## 2020-01-01 RX ADMIN — ATORVASTATIN CALCIUM 20 MG: 10 TABLET, FILM COATED ORAL at 21:04

## 2020-01-01 RX ADMIN — METOPROLOL SUCCINATE 25 MG: 25 TABLET, EXTENDED RELEASE ORAL at 08:43

## 2020-01-01 RX ADMIN — METFORMIN HYDROCHLORIDE 500 MG: 500 TABLET ORAL at 08:43

## 2020-01-01 RX ADMIN — BISACODYL 10 MG: 10 SUPPOSITORY RECTAL at 15:44

## 2020-01-01 RX ADMIN — SENNOSIDES AND DOCUSATE SODIUM 2 TABLET: 8.6; 5 TABLET ORAL at 21:04

## 2020-01-01 RX ADMIN — DOCUSATE SODIUM 100 MG: 100 CAPSULE, LIQUID FILLED ORAL at 08:43

## 2020-01-01 RX ADMIN — FAMOTIDINE 40 MG: 20 TABLET, FILM COATED ORAL at 08:43

## 2020-01-01 NOTE — PLAN OF CARE
Problem: Patient Care Overview  Goal: Plan of Care Review  Outcome: Ongoing (interventions implemented as appropriate)  Flowsheets (Taken 1/1/2020 0212)  Progress: improving  Plan of Care Reviewed With: patient  Outcome Summary: Patient rested well through the night. C/O mild incisional pain, refused pain meds. No neuro changes A&O x4, BACA. VSS, Safety maintained.

## 2020-01-01 NOTE — THERAPY TREATMENT NOTE
Acute Care - Physical Therapy Treatment Note  [facility]     Patient Name: Mickey Conde  : 1935  MRN: [id number]  Today's Date: 2020  Onset of Illness/Injury or Date of Surgery: 19     Referring Physician: Dr. Williamson    Admit Date: 2019    Visit Dx:    ICD-10-CM ICD-9-CM   1. Spinal stenosis of lumbar region with neurogenic claudication M48.062 724.03   2. Spinal stenosis, lumbar region, with neurogenic claudication M48.062 724.03   3. Impaired functional mobility and activity tolerance Z74.09 V49.89   4. Decreased activities of daily living (ADL) Z78.9 V49.89     Patient Active Problem List   Diagnosis   • CAD (coronary artery disease)   • HTN (hypertension)   • Hyperlipidemia   • Myocardial infarct (CMS/HCC)   • Chest pain   • History of coronary artery stent placement   • Degeneration of lumbar or lumbosacral intervertebral disc   • Former smoker   • BMI 26.0-26.9,adult   • Spinal stenosis of lumbar region with neurogenic claudication   • Spinal stenosis, lumbar region, with neurogenic claudication       Therapy Treatment    Rehabilitation Treatment Summary     Row Name 20 1311 20 0948 20 0908       Treatment Time/Intention    Discipline  physical therapy assistant  -AB  physical therapy assistant  -AB  physical therapy assistant  -AB    Document Type  therapy note (daily note)  -AB  therapy note (daily note)  -AB  therapy note (daily note)  -AB    Subjective Information  no complaints  -AB2  no complaints  -AB  no complaints  -AB2    Mode of Treatment  physical therapy  -AB2  physical therapy  -AB  physical therapy  -AB2    Comment  --  pt's call light was going off, stated he needed to go to the bathroom  -AB  --    Existing Precautions/Restrictions  fall;spinal  -AB2  --  spinal;fall  -AB2    Recorded by [AB] Shobha Bryan, PTA 20 1311  [AB2] Shobha Bryan, PTA 20 1323 [AB] Shobha Bryan, PTA 20 0954 [AB] Shobha Bryan, PTA 20  0926  [AB2] Shobha Bryan, PTA 01/01/20 0945    Row Name 01/01/20 1311 01/01/20 0948 01/01/20 0908       Bed Mobility Assessment/Treatment    Supine-Sit Harwood (Bed Mobility)  --  --  verbal cues;contact guard  -AB    Assistive Device (Bed Mobility)  --  --  bed rails  -AB    Comment (Bed Mobility)  up in chair  -AB  up in chair  -AB  --    Recorded by [AB] Shobha Bryan, PTA 01/01/20 1323 [AB] Shobha Bryan, PTA 01/01/20 0954 [AB] Shobha Bryan, PTA 01/01/20 0945    Row Name 01/01/20 1311 01/01/20 0948 01/01/20 0908       Sit-Stand Transfer    Sit-Stand Harwood (Transfers)  contact guard  -AB  contact guard;stand by assist  -AB  contact guard;stand by assist  -AB    Recorded by [AB] Shobha Bryan, PTA 01/01/20 1323 [AB] Shobha Bryan, PTA 01/01/20 0954 [AB] Shobha Bryan, PTA 01/01/20 0945    Row Name 01/01/20 1311 01/01/20 0948 01/01/20 0908       Stand-Sit Transfer    Stand-Sit Harwood (Transfers)  contact guard  -AB  contact guard;stand by assist  -AB  contact guard;stand by assist  -AB    Recorded by [AB] Shobha Bryan, PTA 01/01/20 1323 [AB] Shobha Bryan, PTA 01/01/20 0954 [AB] Shobha Bryan, PTA 01/01/20 0945    Row Name 01/01/20 1311 01/01/20 0948 01/01/20 0908       Gait/Stairs Assessment/Training    Harwood Level (Gait)  contact guard  -AB  contact guard  -AB  contact guard;stand by assist  -AB    Assistive Device (Gait)  walker, front-wheeled  -AB  walker, front-wheeled  -AB  walker, front-wheeled  -AB    Distance in Feet (Gait)  400'  -AB  10' x 2  -AB  400  -AB    Recorded by [AB] Shobha Bryan, PTA 01/01/20 1323 [AB] Shobha Bryan, PTA 01/01/20 0954 [AB] Shobha Bryan, PTA 01/01/20 0945    Row Name 01/01/20 1311 01/01/20 0948 01/01/20 0908       Positioning and Restraints    Pre-Treatment Position  sitting in chair/recliner  -AB  sitting in chair/recliner  -AB  in bed  -AB    Post Treatment Position  chair  -AB  chair  -AB  chair  -AB    In  Chair  reclined;sitting;call light within reach  -AB  reclined;sitting;call light within reach  -AB  reclined;sitting;call light within reach  -AB    Recorded by [AB] Shobha Bryan, PTA 01/01/20 1323 [AB] Shobha Bryan, PTA 01/01/20 0954 [AB] Shobha Bryan, PTA 01/01/20 0945    Row Name 01/01/20 1311 01/01/20 0908          Pain Scale: Numbers Pre/Post-Treatment    Pain Scale: Numbers, Pretreatment  4/10  -AB  3/10  -AB     Pain Scale: Numbers, Post-Treatment  4/10  -AB  3/10  -AB     Pain Location - Orientation  incisional  -AB  incisional  -AB     Pain Location  back  -AB  back  -AB     Pain Intervention(s)  Medication (See MAR)  -AB  Medication (See MAR)  -AB     Recorded by [AB] Shobha Bryan, PTA 01/01/20 1323 [AB] Shobha Bryan, PTA 01/01/20 0945     Row Name                Wound 12/31/19 0847 thoracic spine Incision    Wound - Properties Group Date first assessed: 12/31/19 [BENNY] Time first assessed: 0847 [BENNY] Location: thoracic spine [BENNY] Primary Wound Type: Incision [BENNY] Recorded by:  [BENNY] Reza Ernst RN 12/31/19 0847      User Key  (r) = Recorded By, (t) = Taken By, (c) = Cosigned By    Initials Name Effective Dates Discipline    AB Shobha Bryan, PTA 08/02/16 -  PT    BENNY Reza Ernst RN 08/02/16 -  Nurse          Wound 12/31/19 0847 thoracic spine Incision (Active)   Dressing Appearance dry;intact 1/1/2020  8:43 AM   Closure CELIO 1/1/2020  8:43 AM   Base dressing in place, unable to visualize 1/1/2020  8:43 AM   Drainage Amount none 1/1/2020  8:43 AM   Dressing Care, Wound other (see comments) 12/31/2019  8:00 PM               PT Recommendation and Plan     Progress: improving  Outcome Summary: CGA for bedmobility, SBA/CGA for sit/stand and to amb 400' with Rwx without LOB. He was leeft up in chair with call light.  Outcome Measures     Row Name 12/31/19 1500             How much help from another is currently needed...    Putting on and taking off regular lower body clothing?  2  -MW       Bathing (including washing, rinsing, and drying)  2  -MW      Toileting (which includes using toilet bed pan or urinal)  3  -MW      Putting on and taking off regular upper body clothing  4  -MW      Taking care of personal grooming (such as brushing teeth)  4  -MW      Eating meals  4  -MW      AM-PAC 6 Clicks Score (OT)  19  -MW         Functional Assessment    Outcome Measure Options  AM-PAC 6 Clicks Daily Activity (OT)  -MW        User Key  (r) = Recorded By, (t) = Taken By, (c) = Cosigned By    Initials Name Provider Type    MW Audrey Nath, OTR/L Occupational Therapist         Time Calculation:   PT Charges     Row Name 01/01/20 1311 01/01/20 0948 01/01/20 0908       Time Calculation    Start Time  1311  -AB  0948  -AB  0908  -AB    Stop Time  1323  -AB  1001  -AB  0940  -AB    Time Calculation (min)  12 min  -AB  13 min  -AB  32 min  -AB    PT Received On  --  --  01/01/20  -AB       Time Calculation- PT    Total Timed Code Minutes- PT  12 minute(s)  -AB  --  32 minute(s)  -AB      User Key  (r) = Recorded By, (t) = Taken By, (c) = Cosigned By    Initials Name Provider Type    Shobha So PTA Physical Therapy Assistant        Therapy Charges for Today     Code Description Service Date Service Provider Modifiers Qty    26458696019 HC GAIT TRAINING EA 15 MIN 1/1/2020 Shobha Bryan, CLARA GP 2    23784720758 HC PT THERAPEUTIC ACT EA 15 MIN 1/1/2020 Shobha Bryan, CLARA GP 1    76398925250 HC GAIT TRAINING EA 15 MIN 1/1/2020 Shobha Bryan, CLARA GP 1          PT G-Codes  Outcome Measure Options: AM-PAC 6 Clicks Daily Activity (OT)  AM-PAC 6 Clicks Score (PT): 18  AM-PAC 6 Clicks Score (OT): 19    Shobha Bryan PTA  1/1/2020

## 2020-01-01 NOTE — PROGRESS NOTES
Neurosurgery Daily Progress Note    Assessment:   Mickey Conde is a 84 y.o. male with a significant medical history of prior lumbar surgery by Dr. Brown, level and date unknown, MI, CAD, HTN, BPH, and hyperlipidemia.  He presents with a new known problem of worsening bilateral LE pain that worsens with standing and walking. Physical exam findings of 4/5 left quad weakness, decreased reflexes to the left patella and bilateral Achilles, and an antalgic gait.  Their imaging shows STIR signal changes at the inferior endplate of L3 and superior endplate of L4, retrolisthesis of L5 over S1, multilevel facet arthropathy and ligamentous flavum hypertrophy and spondylosis worse at L3-4 and L4-5 resulting in central canal and bilateral foraminal stenosis at L3-4 and L4-5.    DDX:     Spinal stenosis, lumbar region, with neurogenic claudication    Spinal stenosis of lumbar region with neurogenic claudication    Patient Active Problem List   Diagnosis   • CAD (coronary artery disease)   • HTN (hypertension)   • Hyperlipidemia   • Myocardial infarct (CMS/HCC)   • Chest pain   • History of coronary artery stent placement   • Degeneration of lumbar or lumbosacral intervertebral disc   • Former smoker   • BMI 26.0-26.9,adult   • Spinal stenosis of lumbar region with neurogenic claudication   • Spinal stenosis, lumbar region, with neurogenic claudication     Plan:   Neuro: Stable. Neurogenic claudication resolved, increased strength to LLE   POD # 1 left L3/4 and L4/5 decompression   SOL = 155 ml   Neurochecks per policy  CV: No acute issues; CAD s/p stents; On ASA 81mg  Pulm: KARISHMA.  Maintaining O2 sat; continuous pulse oximetry  :  KARISHMA.  Voiding spontaneously  FEN: Tolerating regular diet  Endocrine: KARISHMA  GI: KARISHMA.  Prophylaxis; + flatus; mild abdominal pain, no BM TD; and Dulcolax suppository  ID: 23-hour prophylactic postoperative antibiotics  Heme:  DVT prophylaxis; SCDs  Pain: Well controlled  Dispo: Max PT/OT    Chief  "complaint:   Leg pain with standing for 1 min    Subjective  Doing well when recumbant    Temp:  [97.8 °F (36.6 °C)-99.1 °F (37.3 °C)] 99.1 °F (37.3 °C)  Heart Rate:  [62-80] 64  Resp:  [16] 16  BP: (140-153)/(58-85) 146/72    Objective:  Vital signs: (most recent): Blood pressure 146/72, pulse 64, temperature 99.1 °F (37.3 °C), temperature source Oral, resp. rate 16, height 172.7 cm (68\"), weight 73.9 kg (163 lb), SpO2 92 %.        Neurologic Exam     Mental Status   Oriented to person, place, and time.   Speech: speech is normal   Level of consciousness: alert    Alert.  Bright and awake.  Follows commands.  Shows thumbs up and 2 fingers bilaterally.     Cranial Nerves     CN III, IV, VI   Pupils are equal, round, and reactive to light.  Extraocular motions are normal.     CN V   Facial sensation intact.     CN VII   Facial expression full, symmetric.     Motor Exam   Right arm pronator drift: absent  Left arm pronator drift: absent    Strength   Right deltoid: 5/5  Left deltoid: 5/5  Right biceps: 5/5  Left biceps: 5/5  Right triceps: 5/5  Left triceps: 5/5  Right iliopsoas: 5/5  Left iliopsoas: 5/5  Right quadriceps: 5/5  Left quadriceps: 5/5  Right anterior tibial: 5/5  Left anterior tibial: 5/5  Right gastroc: 5/5  Left gastroc: 5/5  BACA.       Sensory Exam   Right arm light touch: normal  Left arm light touch: normal  Right leg light touch: normal  Left leg light touch: normal    Gait, Coordination, and Reflexes     Gait  Gait: (claudication and kyphosis)    Drains: SOL X1    Imaging Results (Last 24 Hours)     Procedure Component Value Units Date/Time    XR Spine Lumbar 2 or 3 View [[id number]] Collected:  12/31/19 1502     Updated:  12/31/19 1528    Narrative:       XR SPINE LUMBAR 2 OR 3 VW- 12/31/2019 1:54 PM CST     HISTORY: hemilaminectomy; M48.062-Spinal stenosis, lumbar region with  neurogenic claudication; M48.062-Spinal stenosis, lumbar region with  neurogenic claudication     COMPARISON: None   "   FLUOROSCOPY TIME: 8 seconds     FLUOROSCOPY DOSE: 3.24 mGy     NUMBER OF IMAGES: 4       Impression:          Intraoperative fluoroscopic images during hemilaminectomy.     Please refer to the operative note for more details.  This report was finalized on 12/31/2019 15:02 by Dr Antione Schmidt, .    FL C Arm During Surgery [[id number]] Collected:  12/31/19 1502     Updated:  12/31/19 1528    Narrative:       XR SPINE LUMBAR 2 OR 3 VW- 12/31/2019 1:54 PM CST     HISTORY: hemilaminectomy; M48.062-Spinal stenosis, lumbar region with  neurogenic claudication; M48.062-Spinal stenosis, lumbar region with  neurogenic claudication     COMPARISON: None     FLUOROSCOPY TIME: 8 seconds     FLUOROSCOPY DOSE: 3.24 mGy     NUMBER OF IMAGES: 4       Impression:          Intraoperative fluoroscopic images during hemilaminectomy.     Please refer to the operative note for more details.  This report was finalized on 12/31/2019 15:02 by Dr Antione Schmidt, .        Lab Results (last 24 hours)     Procedure Component Value Units Date/Time    Blood Culture - Blood, Arm, Right [[id number]] Collected:  12/30/19 1120    Specimen:  Blood from Arm, Right Updated:  12/31/19 1215     Blood Culture No growth at 24 hours    Blood Culture - Blood, Arm, Right [[id number]] Collected:  12/30/19 1123    Specimen:  Blood from Arm, Right Updated:  12/31/19 1215     Blood Culture No growth at 24 hours        [id number]  Gregg Worrell, APRN

## 2020-01-01 NOTE — PLAN OF CARE
Problem: Patient Care Overview  Goal: Plan of Care Review  Outcome: Ongoing (interventions implemented as appropriate)  Flowsheets (Taken 1/1/2020 0908)  Progress: improving  Outcome Summary: CGA for bedmobility, SBA/CGA for sit/stand and to amb 400' with Rwx without LOB. He was leeft up in chair with call light.

## 2020-01-01 NOTE — PLAN OF CARE
Problem: Patient Care Overview  Goal: Plan of Care Review  Outcome: Ongoing (interventions implemented as appropriate)  Flowsheets (Taken 1/1/2020 6513)  Progress: improving  Plan of Care Reviewed With: patient  Outcome Summary: Pt has complained of pain once, treated and releived with PRN meds. Suppository was given. No complaints. Neuro intact. VSS. Safety maintained. WIll continue to monitor.

## 2020-01-02 VITALS
DIASTOLIC BLOOD PRESSURE: 78 MMHG | SYSTOLIC BLOOD PRESSURE: 126 MMHG | OXYGEN SATURATION: 97 % | TEMPERATURE: 98.5 F | HEART RATE: 68 BPM | BODY MASS INDEX: 24.71 KG/M2 | WEIGHT: 163 LBS | HEIGHT: 68 IN | RESPIRATION RATE: 16 BRPM

## 2020-01-02 PROCEDURE — 99024 POSTOP FOLLOW-UP VISIT: CPT | Performed by: NURSE PRACTITIONER

## 2020-01-02 RX ORDER — DICLOFENAC SODIUM 75 MG/1
75 TABLET, DELAYED RELEASE ORAL 2 TIMES DAILY
Qty: 60 TABLET | Refills: 2
Start: 2020-01-12 | End: 2020-02-26

## 2020-01-02 RX ORDER — AMOXICILLIN 250 MG
2 CAPSULE ORAL NIGHTLY
Qty: 60 TABLET | Refills: 0 | Status: SHIPPED | OUTPATIENT
Start: 2020-01-02 | End: 2020-02-26

## 2020-01-02 RX ORDER — ASPIRIN 81 MG/1
81 TABLET ORAL DAILY
Start: 2020-01-09 | End: 2021-09-30

## 2020-01-02 RX ORDER — DIAZEPAM 5 MG/1
5 TABLET ORAL EVERY 8 HOURS PRN
Qty: 20 TABLET | Refills: 0
Start: 2020-01-02 | End: 2020-02-26

## 2020-01-02 RX ORDER — HYDROCODONE BITARTRATE AND ACETAMINOPHEN 7.5; 325 MG/1; MG/1
1 TABLET ORAL EVERY 6 HOURS PRN
Qty: 56 TABLET | Refills: 0
Start: 2020-01-02 | End: 2020-02-26

## 2020-01-02 RX ADMIN — HYDROCODONE BITARTRATE AND ACETAMINOPHEN 1 TABLET: 7.5; 325 TABLET ORAL at 07:06

## 2020-01-02 RX ADMIN — AMLODIPINE BESYLATE 10 MG: 10 TABLET ORAL at 08:14

## 2020-01-02 RX ADMIN — METOPROLOL SUCCINATE 25 MG: 25 TABLET, EXTENDED RELEASE ORAL at 08:14

## 2020-01-02 RX ADMIN — LISINOPRIL 20 MG: 20 TABLET ORAL at 08:14

## 2020-01-02 RX ADMIN — FAMOTIDINE 40 MG: 20 TABLET, FILM COATED ORAL at 08:14

## 2020-01-02 RX ADMIN — METFORMIN HYDROCHLORIDE 500 MG: 500 TABLET ORAL at 08:14

## 2020-01-02 RX ADMIN — DOCUSATE SODIUM 100 MG: 100 CAPSULE, LIQUID FILLED ORAL at 08:14

## 2020-01-02 NOTE — DISCHARGE SUMMARY
DISCHARGE SUMMARY FROM HOSPITAL (OPERATIVE)     Date of Discharge:  1/2/2020    Discharge Diagnosis:   Patient Active Problem List   Diagnosis   • CAD (coronary artery disease)   • HTN (hypertension)   • Hyperlipidemia   • Myocardial infarct (CMS/HCC)   • Chest pain   • History of coronary artery stent placement   • Degeneration of lumbar or lumbosacral intervertebral disc   • Former smoker   • BMI 26.0-26.9,adult   • Spinal stenosis of lumbar region with neurogenic claudication   • Spinal stenosis, lumbar region, with neurogenic claudication     1. Spinal stenosis of lumbar region with neurogenic claudication    2. Spinal stenosis, lumbar region, with neurogenic claudication    3. Impaired functional mobility and activity tolerance    4. Decreased activities of daily living (ADL)    5. Post-op pain      Presenting Problem/History of Present Illness  Spinal stenosis of lumbar region with neurogenic claudication [M48.062]   1. Spinal stenosis of lumbar region with neurogenic claudication    2. Spinal stenosis, lumbar region, with neurogenic claudication    3. Impaired functional mobility and activity tolerance    4. Decreased activities of daily living (ADL)    5. Post-op pain      Hospital Course  Patient is a 84 y.o. male presented with a significant medical history of prior lumbar surgery by Dr. Brown, level and date unknown, MI, CAD, HTN, BPH, and hyperlipidemia.  He presents with a new known problem of worsening bilateral LE pain that worsens with standing and walking. Physical exam findings of 4/5 left quad weakness, decreased reflexes to the left patella and bilateral Achilles, and an antalgic gait.  Their imaging shows STIR signal changes at the inferior endplate of L3 and superior endplate of L4, retrolisthesis of L5 over S1, multilevel facet arthropathy and ligamentous flavum hypertrophy and spondylosis worse at L3-4 and L4-5 resulting in central canal and bilateral foraminal stenosis at L3-4 and L4-5.       On 12/31/2019 the patient was brought to the main operating room where he underwent an uneventful left L3-4 and L4-5 hemilaminectomy per Dr. Williamson.  Postoperatively he  did extremely well and his neurological exam improved.  He was kept in the hospital for close neurologic monitoring, airway observation, and for pain control.  He has been able to ambulate, tolerate oral diet, oral pain medication, void, and felt a significant improvement in his lower extremity weakness and sensation.  He has been evaluated by physical therapy and occupational therapy and deemed safe for discharge home with family.  He will be given an appropriate course of oral medication for pain control.  Additional instructions provided.    Procedures Performed  Procedure(s):  LEFT LUMBAR HEMILAMINECTOMY WITHOUT FUSION AT LUMBAR 3/4 AND 4/5     Consults:   Consults     No orders found from 12/1/2019 to 12/31/2019.        Pertinent Test Results:   Xr Spine Lumbar 2 Or 3 View    Result Date: 12/31/2019   Intraoperative fluoroscopic images during hemilaminectomy.  Please refer to the operative note for more details. This report was finalized on 12/31/2019 15:02 by Dr Antione Schmidt, .    Mri Lumbar Spine Without Contrast    Result Date: 12/30/2019  1. Advanced multilevel degenerative changes, most severe at L3-4, there is extensive endplate edema and severe spinal stenosis, the plate edema shows slight increase compared with the study approximately one month ago. Small endplate erosions are again demonstrated L3-4. The marrow edema at L3-4 is most likely reactive, though clinical correlation for the possibility of low-grade discitis/osteomyelitis is suggested. A small stable left paracentral HNP is also present at L3-4. There is multilevel neuroforaminal narrowing as detailed above.   This report was finalized on 12/30/2019 10:41 by Dr. Delano Martinez MD.    Xr Chest 1 View    Result Date: 12/30/2019  1. Hypoinflated lungs without focal  consolidation or pleural effusion.   This report was finalized on 12/30/2019 18:29 by Dr. Robert Bermudez MD.    Fl C Arm During Surgery    Result Date: 12/31/2019   Intraoperative fluoroscopic images during hemilaminectomy.  Please refer to the operative note for more details. This report was finalized on 12/31/2019 15:02 by Dr Antione Schmidt, .    CBC:   Results from last 7 days   Lab Units 12/30/19  1120   WBC 10*3/mm3 9.76   HEMOGLOBIN g/dL 15.4   HEMATOCRIT % 44.5   PLATELETS 10*3/mm3 235     BMP:  Results from last 7 days   Lab Units 12/30/19  1120   SODIUM mmol/L 142   POTASSIUM mmol/L 3.4*   CHLORIDE mmol/L 102   CO2 mmol/L 29.0   BUN mg/dL 13   CREATININE mg/dL 0.53*   GLUCOSE mg/dL 117*   CALCIUM mg/dL 9.0   ALT (SGPT) U/L 16     Culture Results:   Blood Culture   Date Value Ref Range Status   12/30/2019 No growth at 2 days  Preliminary   12/30/2019 No growth at 2 days  Preliminary     Condition on Discharge:  Stable    Vital Signs  Temp:  [98 °F (36.7 °C)-99.2 °F (37.3 °C)] 98.4 °F (36.9 °C)  Heart Rate:  [55-68] 67  Resp:  [16-18] 17  BP: (125-155)/(49-89) 137/89    Physical Exam:   Physical Exam   Constitutional: He is oriented to person, place, and time. He appears well-developed and well-nourished. He is cooperative.  Non-toxic appearance. He does not have a sickly appearance. He does not appear ill. No distress.   HENT:   Head: Normocephalic and atraumatic.   Right Ear: Hearing normal.   Left Ear: Hearing normal.   Mouth/Throat: Mucous membranes are normal.   Eyes: Pupils are equal, round, and reactive to light. Conjunctivae and EOM are normal.   Neck: Trachea normal and full passive range of motion without pain. Neck supple.   Cardiovascular: Normal rate and regular rhythm.   Pulmonary/Chest: Effort normal. No accessory muscle usage. No apnea, no tachypnea and no bradypnea. No respiratory distress.   Abdominal: Soft. Normal appearance.   Neurological: He is alert and oriented to person, place, and  time. Gait normal. GCS eye subscore is 4. GCS verbal subscore is 5. GCS motor subscore is 6.   Reflex Scores:       Tricep reflexes are 2+ on the right side and 2+ on the left side.       Bicep reflexes are 2+ on the right side and 2+ on the left side.       Brachioradialis reflexes are 2+ on the right side and 2+ on the left side.       Patellar reflexes are 2+ on the right side and 2+ on the left side.       Achilles reflexes are 0 on the right side and 0 on the left side.  Skin: Skin is warm, dry and intact.        Psychiatric: He has a normal mood and affect. His speech is normal and behavior is normal.   Nursing note and vitals reviewed.       Neurologic Exam     Mental Status   Oriented to person, place, and time.   Attention: normal. Concentration: normal.   Speech: speech is normal   Level of consciousness: alert    Bright and awake.  Follows commands.  Shows thumbs up and 2 fingers bilaterally.     Cranial Nerves     CN II   Visual fields full to confrontation.     CN III, IV, VI   Pupils are equal, round, and reactive to light.  Extraocular motions are normal.     CN V   Facial sensation intact.     CN VII   Facial expression full, symmetric.     CN VIII   CN VIII normal.     CN IX, X   CN IX normal.     CN XI   CN XI normal.     Motor Exam   Muscle bulk: normal  Overall muscle tone: normal  Right arm tone: normal  Left arm tone: normal  Right arm pronator drift: absent  Left arm pronator drift: absent  Right leg tone: normal  Left leg tone: normal    Strength   Right deltoid: 5/5  Left deltoid: 5/5  Right biceps: 5/5  Left biceps: 5/5  Right triceps: 5/5  Left triceps: 5/5  Right wrist extension: 5/5  Left wrist extension: 5/5  Right iliopsoas: 5/5  Left iliopsoas: 5/5  Right quadriceps: 5/5  Left quadriceps: 5/5  Right anterior tibial: 5/5  Left anterior tibial: 5/5  Right posterior tibial: 5/5  Left posterior tibial: 5/5  BACA.     Sensory Exam   Light touch normal.     Gait, Coordination, and Reflexes      Gait  Gait: normal    Tremor   Resting tremor: absent  Intention tremor: absent  Action tremor: absent    Reflexes   Right brachioradialis: 2+  Left brachioradialis: 2+  Right biceps: 2+  Left biceps: 2+  Right triceps: 2+  Left triceps: 2+  Right patellar: 2+  Left patellar: 2+  Right achilles: 0  Left achilles: 0  Right : 4+  Left : 4+  Right plantar: normal  Left plantar: normal  Right Dupree: absent  Left Dupree: absent  Right ankle clonus: absent  Left ankle clonus: absent  Right pendular knee jerk: absent  Left pendular knee jerk: absent    Discharge Disposition  Home or Self Care    Discharge Medications     Discharge Medications      New Medications      Instructions Start Date   diazePAM 5 MG tablet  Commonly known as:  VALIUM   5 mg, Oral, Every 8 Hours PRN      HYDROcodone-acetaminophen 7.5-325 MG per tablet  Commonly known as:  NORCO   1 tablet, Oral, Every 6 Hours PRN      sennosides-docusate 8.6-50 MG per tablet  Commonly known as:  PERICOLACE   2 tablets, Oral, Nightly         Changes to Medications      Instructions Start Date   aspirin 81 MG EC tablet  What changed:  These instructions start on January 9, 2020. If you are unsure what to do until then, ask your doctor or other care provider.   81 mg, Oral, Daily   Start Date:  January 9, 2020     diclofenac 75 MG EC tablet  Commonly known as:  VOLTAREN  What changed:  These instructions start on January 12, 2020. If you are unsure what to do until then, ask your doctor or other care provider.   75 mg, Oral, 2 Times Daily   Start Date:  January 12, 2020        Continue These Medications      Instructions Start Date   amLODIPine 10 MG tablet  Commonly known as:  NORVASC   TAKE ONE TABLET BY MOUTH ONCE DAILY      lisinopril 20 MG tablet  Commonly known as:  PRINIVIL,ZESTRIL   TAKE ONE TABLET BY MOUTH ONCE DAILY      metFORMIN 500 MG tablet  Commonly known as:  GLUCOPHAGE   500 mg, Oral, Daily      metoprolol succinate XL 25 MG 24 hr  tablet  Commonly known as:  TOPROL-XL   TAKE 1 TABLET BY MOUTH ONCE DAILY      OSTEO BI-FLEX ADV DOUBLE ST PO   Oral      simvastatin 40 MG tablet  Commonly known as:  ZOCOR   TAKE ONE TABLET BY MOUTH EVERY NIGHT.         Stop These Medications    fish oil 1000 MG capsule capsule     tiZANidine 4 MG tablet  Commonly known as:  ZANAFLEX          Discharge Diet:   Diet Instructions     Advance Diet As Tolerated           Activity at Discharge:   Activity Instructions     Activity as Tolerated      Bathing Restrictions      May shower in 24 hours postoperatively.  Remove bandage. No tub baths.  Showers only.  Allow clean soapy water to cleanse wound.  Do not scrub incision.    Type of Restriction:  Bathing    Bathing Restrictions:  No Tub Bath    Driving Restrictions      Type of Restriction:  Driving    Driving Restrictions:  No Driving While Taking Narcotics    Gradually Increase Activity Until at Pre-Hospitalization Level      Lifting Restrictions      Type of Restriction:  Lifting    Lifting Restrictions:  Lifting Restriction (Indicate Limit)    Weight Limit (Pounds):  8    Length of Lifting Restriction:  2-3 WEEKS         Follow-up Appointments  Future Appointments   Date Time Provider Department Center   2/14/2020  8:00 AM Delano Padilla MD [facility]   2/18/2020  8:30 AM Rd Mcknight MD [facility] None     Additional Instructions for the Follow-ups that You Need to Schedule     Call MD With Problems / Concerns   As directed      Instructions: Call for worsening pain or a concern for a postoperative incision infection (increased incisional redness, swelling, warmth, or drainage/discharge).    Order Comments:  Instructions: Call for worsening pain or a concern for a postoperative incision infection (increased incisional redness, swelling, warmth, or drainage/discharge).          Discharge Follow-up with Specified Provider: Dr. Williamson; 6 Weeks   As directed      To:  Dr. Williamson    Follow Up:   6 Weeks         Discharge Follow-up with Specified Provider: VIVIEN Edwards; 2 Weeks   As directed      To:  VIVIEN Edwards    Follow Up:  2 Weeks    Follow Up Details:  Postop wound check             Test Results Pending at Discharge   Order Current Status    Blood Culture - Blood, Arm, Right Preliminary result    Blood Culture - Blood, Arm, Right Preliminary result        VIVIEN Keene  01/02/20  10:20 AM    Time: Discharge 35 min  [id number]

## 2020-01-02 NOTE — PLAN OF CARE
Problem: Patient Care Overview  Goal: Plan of Care Review  Outcome: Ongoing (interventions implemented as appropriate)  Flowsheets  Taken 1/2/2020 1032  Progress: improving  Outcome Summary: A&Ox4, c/o incisional back pain, PRN pain medication given. Pt states pain medications improved pain. No numbness or tingling. Dressing CDI. VSS. BACA. Up standby assist. Continue to monitor until d/c today. Safety maintained.  Taken 1/2/2020 0816  Plan of Care Reviewed With: patient    Problem: Patient Care Overview  Goal: Individualization and Mutuality  Outcome: Ongoing (interventions implemented as appropriate)  Flowsheets (Taken 1/2/2020 1032)  Patient Specific Goals (Include Timeframe): Decrease pain and increase mobility before d/c

## 2020-01-02 NOTE — PLAN OF CARE
Problem: Patient Care Overview  Goal: Plan of Care Review  Outcome: Ongoing (interventions implemented as appropriate)  Flowsheets (Taken 1/2/2020 0318)  Progress: improving  Plan of Care Reviewed With: patient  Outcome Summary: Patient rested well through the night. C/O incisional pain, refeused pain meds. No neuro changes, A&O x4, BACA, VSS, Safety maintained.

## 2020-01-02 NOTE — DISCHARGE INSTRUCTIONS
[facility] Neurosurgery    Postoperative care following spine surgery  Dear Patient,  You have recently undergone spine surgery (left lumbar 3/4 and 4/5 hemilaminectomy) and are now ready to go home. These written instructions are intended to help you to recover quickly.  • If you have ANY QUESTIONS about your condition prior to discharge please ask Dr. Williamson. In particular, if you have concerns about going home discuss them now. We do not want you to go until you are completely comfortable leaving the hospital.   • If you have ANY QUESTIONS about your condition after you go home call your doctor. The number is [phone] which is answered 24 hours a day. During regular working hours a  will connect you to your doctor, one of his partners, or one of our nurses. At night or on weekends the answering service will connect you with the physician on call. DO NOT HESITATE to call. We want to help you with any problems.     Deep vein thrombosis/ pulmonary embolus  Some patients who undergo surgery develop blood clots in the veins of the legs. These clots can cause pain or swelling in the legs, or may cause no obvious problem. They can break free from the legs and travel to the lungs causing shortness of breath and/or chest pain.you develop pain or swelling in your legs after surgery, call your doctor. If you develop breathing problems or chest pain after surgery, call 911.    Neurological Deficit  Neurological deficits are problems with brain function like speech difficulty, weakness, numbness, imbalance, etc. These deficits may be present before or after spine surgery. Prior to discharge your doctor will make sure that all treatment needed to help you recover from such deficits has been instituted. He will also make sure that these deficits are stable or improving. After you go home, if you think any of your spine problems are getting worse, not better, it may be a sign of bleeding, infection, or other  problems. Call your doctor. He will order tests and prescribe treatment as needed.    Activity Restrictions  In general after surgery you will be on restricted activities for several weeks to several months depending on the nature of your operation. For six weeks you should avoid heavy lifting (over 8 lbs or roughly a gallon of milk), bending, or stooping. You may be released by Dr. Williamson earlier. You may return to driving when you feel comfortable enough that you can safely handle your vehicle AND you are not taking narcotic pain medications. This may be as early as 10 - 14 days, but could be longer as instructed by your neurosurgeon. After surgery you may gradually increase your activities, as you are able to tolerate. Walking is an excellent low impact exercise to begin after surgery. You should try to make regular walks of 15 to 30 minutes part of your postoperative recovery as you become able to do so. It typically requires a period of days to a few weeks to reach this level of activity and should be done in a gradual fashion. Your return to work will depend on your job requirements. In general, you may return to light duty work as soon as you feel comfortable and are not taking routine narcotic pain medications.    Medication  It is important to take your medication EXACTLY as prescribed. Some patients are reluctant to take pain medication. It is perfectly fine to take pain medication for several weeks after surgery. We want to eliminate pain whenever possible. Many pain medications can cause nausea (sick to your stomach), constipation (inability to poop), or itching. Nausea may be minimized by taking the medication with food. Constipation can be relieved by taking stool softeners and/ or laxatives that you can purchase over the counter as needed.    It is important to realize that no pain after surgery is an unrealistic expectation.  Pain medication will never reduce your pain score to zero.  The goal of  pain medicine is to reduce your pain to the point you can move, take care of yourself, and participate in therapy.  Make sure to work with your caregiver to determine what is an adequate level of pain control to promote healthy movement and then take your medication to reach this goal.      Please taper your pain medications to avoid long term addiction.  Week 1: Take your pain medication no more than ever 4 hours as needed for severe pain.  Week 2: Take your pain medication no more than ever 6 hours as needed for severe pain.  Week 3: Take your pain medication no more than ever 8 hours as needed for severe pain.  Week 4: Take your pain medication no more than ever 12 hours as needed for severe pain.  Week 5: Take your pain medication no more than ever 24 hours as needed for severe pain.  By Week 6 you should be off of all pain medication.     Wound Care  Your incision is held together with dissolvable sutures that do not need to be removed.     Seventy-two hours after surgery it is OK to get the wound wet, so you can take a shower or bath.     You do not need to put any medication (like Neosporin or Vitamin E) on the wound. Scrubbing the wound should be avoided until the staples nondissolvable sutures come out.     Heating pads have the potential to cause very serious burns, especially in patients using narcotic pain medications (e.g. Oxycodone, Oxycontin, etc.) Do not use heating pads during your recovery.      No nicotine products, including second-hand smoke, gum or patches. Nicotine will delay healing and increase the likelihood of a surgical complication. For help quitting, call the Quitline: 1-860.228.3732     Potential wound problems include the following:  • Infection--If the wound becomes red, tender, swollen, or warm it may be infected. Infection is often accompanied by fever. If you think your wound might be infected you should call your doctor. Often you can send us a picture of the wound so we can  better evaluate it.   • Drainage--Fluid should not drain from your wound. If it does, call your doctor. Colored fluid may indicate infection. Clear fluid may indicate leakage of spinal fluid.   • Dehiscence--If the wound does not heal properly it may open up along the staple line. This is called dehiscence. Call us immediately.   • Sutures--Occasionally, one of the buried threads (sutures) may work through the skin. If you think this has happened call your doctor.   • Swelling--Spinal fluid or blood may collect under the skin. This is usually harmless, but needs to be evaluated. Call your doctor.     How to contact your doctor  Dr. Williamson and his team did your surgery and, therefore, are likely to know more about your condition than any other physicians. We are immediately available to help you with any problems after surgery. Please call us for any concerns at the following numbers:  • Doctor’s office:  [phone] (answered 24 hours a day)   • [facility] : [phone] (alternative emergency number for on-call neurosurgeon)     Specific instructions related to your surgery  Diet: no restrictions, eat a heart healthy diet. If you are diabetic, tightly controlling your blood sugar over the next 2 weeks is crucial in controlling infection.     Activity: as tolerated, no heavy lifting or strenuous exercise for at least 2 weeks.    Please do not use NSAIDs (Ibuprofen, Toradol, etc) for 4 weeks following spinal fusion, as this can prevent bone growth. Tylenol is acceptable as an over the counter pain management.     Follow up:   Follow up with with Gregg PUGA in 2 weeks for post op wound check and with Dr. Williamson in the neurosurgery clinic ([phone]) in 6 weeks. We will schedule this appointment for you.            Sincerely,        Eliot Williamson MD, PhD, MPH

## 2020-01-02 NOTE — PROGRESS NOTES
Neurosurgery Daily Progress Note    Assessment:   Mickey Conde is a 84 y.o. male with a significant medical history of prior lumbar surgery by Dr. Brown, level and date unknown, MI, CAD, HTN, BPH, and hyperlipidemia.  He presents with a new known problem of worsening bilateral LE pain that worsens with standing and walking. Physical exam findings of 4/5 left quad weakness, decreased reflexes to the left patella and bilateral Achilles, and an antalgic gait.  Their imaging shows STIR signal changes at the inferior endplate of L3 and superior endplate of L4, retrolisthesis of L5 over S1, multilevel facet arthropathy and ligamentous flavum hypertrophy and spondylosis worse at L3-4 and L4-5 resulting in central canal and bilateral foraminal stenosis at L3-4 and L4-5.    DDX:     Spinal stenosis, lumbar region, with neurogenic claudication    Spinal stenosis of lumbar region with neurogenic claudication    Patient Active Problem List   Diagnosis   • CAD (coronary artery disease)   • HTN (hypertension)   • Hyperlipidemia   • Myocardial infarct (CMS/HCC)   • Chest pain   • History of coronary artery stent placement   • Degeneration of lumbar or lumbosacral intervertebral disc   • Former smoker   • BMI 26.0-26.9,adult   • Spinal stenosis of lumbar region with neurogenic claudication   • Spinal stenosis, lumbar region, with neurogenic claudication     Plan:   Neuro: Stable. Neurogenic claudication resolved, increased strength to LLE   POD # 2 left L3/4 and L4/5 decompression   SOL = 50 ml; removed   Neurochecks per policy  CV: No acute issues; CAD s/p stents; On ASA 81mg  Pulm: KARISHMA.  Maintaining O2 sat; continuous pulse oximetry  :  KARISHMA.  Voiding spontaneously  FEN: Tolerating regular diet  Endocrine: KARISHMA  GI: KARISHMA.  Prophylaxis; + flatus; mild abdominal pain, no BM TD; and Dulcolax suppository  ID: 23-hour prophylactic postoperative antibiotics  Heme:  DVT prophylaxis; SCDs  Pain: Well controlled  Dispo: Max  "PT/OT    Chief complaint:   Leg pain with standing for 1 min    Subjective  Doing well when recumbant    Temp:  [98 °F (36.7 °C)-99.2 °F (37.3 °C)] 98.4 °F (36.9 °C)  Heart Rate:  [55-68] 67  Resp:  [16-18] 17  BP: (125-155)/(49-89) 137/89    Objective:  Vital signs: (most recent): Blood pressure 137/89, pulse 67, temperature 98.4 °F (36.9 °C), temperature source Oral, resp. rate 17, height 172.7 cm (68\"), weight 73.9 kg (163 lb), SpO2 96 %.        Neurologic Exam     Mental Status   Oriented to person, place, and time.   Speech: speech is normal   Level of consciousness: alert    Alert.  Bright and awake.  Follows commands.  Shows thumbs up and 2 fingers bilaterally.     Cranial Nerves     CN III, IV, VI   Pupils are equal, round, and reactive to light.  Extraocular motions are normal.     CN V   Facial sensation intact.     CN VII   Facial expression full, symmetric.     Motor Exam   Right arm pronator drift: absent  Left arm pronator drift: absent    Strength   Right deltoid: 5/5  Left deltoid: 5/5  Right biceps: 5/5  Left biceps: 5/5  Right triceps: 5/5  Left triceps: 5/5  Right iliopsoas: 5/5  Left iliopsoas: 5/5  Right quadriceps: 5/5  Left quadriceps: 5/5  Right anterior tibial: 5/5  Left anterior tibial: 5/5  Right gastroc: 5/5  Left gastroc: 5/5  BACA.       Sensory Exam   Right arm light touch: normal  Left arm light touch: normal  Right leg light touch: normal  Left leg light touch: normal    Gait, Coordination, and Reflexes     Gait  Gait: (claudication and kyphosis)    Drains: SOL removed    Imaging Results (Last 24 Hours)     ** No results found for the last 24 hours. **        Lab Results (last 24 hours)     Procedure Component Value Units Date/Time    Blood Culture - Blood, Arm, Right [[id number]] Collected:  12/30/19 1120    Specimen:  Blood from Arm, Right Updated:  01/01/20 1215     Blood Culture No growth at 2 days    Blood Culture - Blood, Arm, Right [[id number]] Collected:  12/30/19 1123    " Specimen:  Blood from Arm, Right Updated:  01/01/20 1215     Blood Culture No growth at 2 days        [id number]  Gregg Worrell, APRN

## 2020-01-03 ENCOUNTER — READMISSION MANAGEMENT (OUTPATIENT)
Dept: CALL CENTER | Facility: HOSPITAL | Age: 85
End: 2020-01-03

## 2020-01-03 NOTE — THERAPY DISCHARGE NOTE
Acute Care - Occupational Therapy Discharge Summary  [facility]     Patient Name: Mickey Conde  : 1935  MRN: [id number]    Today's Date: 1/3/2020  Onset of Illness/Injury or Date of Surgery: 19    Date of Referral to OT: 19  Referring Physician: Dr. Williamson      Admit Date: 2019        OT Recommendation and Plan    Visit Dx:    ICD-10-CM ICD-9-CM   1. Spinal stenosis of lumbar region with neurogenic claudication M48.062 724.03   2. Spinal stenosis, lumbar region, with neurogenic claudication M48.062 724.03   3. Impaired functional mobility and activity tolerance Z74.09 V49.89   4. Decreased activities of daily living (ADL) Z78.9 V49.89   5. Post-op pain G89.18 338.18               Rehab Goal Summary     Row Name 20 0700             Transfer Goal 1 (OT)    Activity/Assistive Device (Transfer Goal 1, OT)  toilet;tub;sit-to-stand/stand-to-sit;bed-to-chair/chair-to-bed  -TS      Tate Level/Cues Needed (Transfer Goal 1, OT)  standby assist  -TS      Time Frame (Transfer Goal 1, OT)  long term goal (LTG);by discharge  -TS      Progress/Outcome (Transfer Goal 1, OT)  goal not met  -TS         Dressing Goal 1 (OT)    Activity/Assistive Device (Dressing Goal 1, OT)  lower body dressing  -TS      Tate/Cues Needed (Dressing Goal 1, OT)  minimum assist (75% or more patient effort)  -TS      Time Frame (Dressing Goal 1, OT)  long term goal (LTG);by discharge  -TS      Progress/Outcome (Dressing Goal 1, OT)  goal not met  -TS         Safety Awareness Goal 1 (OT)    Activity (Safety Awareness Goal 1, OT)  impulse control;demonstrates compliance;demonstration of safe behaviors;demonstrates understanding  -TS      Tate/Cues/Accuracy (Safety Awareness Goal 1, OT)  with minimum;verbal cues/redirection;with 90% accuracy  -TS      Time Frame (Safety Awareness Goal 1, OT)  long term goal (LTG);by discharge  -TS      Progress/Outcome (Safety Awareness Goal 1, OT)  goal not met   -TS        User Key  (r) = Recorded By, (t) = Taken By, (c) = Cosigned By    Initials Name Provider Type Discipline    Izabela Watt COTA/L Occupational Therapy Assistant OT          Outcome Measures     Row Name 12/31/19 1500             How much help from another is currently needed...    Putting on and taking off regular lower body clothing?  2  -MW      Bathing (including washing, rinsing, and drying)  2  -MW      Toileting (which includes using toilet bed pan or urinal)  3  -MW      Putting on and taking off regular upper body clothing  4  -MW      Taking care of personal grooming (such as brushing teeth)  4  -MW      Eating meals  4  -MW      AM-PAC 6 Clicks Score (OT)  19  -MW         Functional Assessment    Outcome Measure Options  AM-PAC 6 Clicks Daily Activity (OT)  -MW        User Key  (r) = Recorded By, (t) = Taken By, (c) = Cosigned By    Initials Name Provider Type    Audrey Lai, OTR/L Occupational Therapist          Therapy Suggested Charges     Code   Minutes Charges    None                 OT Discharge Summary  Reason for Discharge: Discharge from facility  Outcomes Achieved: Refer to plan of care for updates on goals achieved  Discharge Destination: Home with assist      LESLIE Boss  1/3/2020

## 2020-01-03 NOTE — THERAPY DISCHARGE NOTE
Acute Care - Physical Therapy Discharge Summary  [facility]       Patient Name: Mickey Conde  : 1935  MRN: [id number]    Today's Date: 1/3/2020  Onset of Illness/Injury or Date of Surgery: 19       Referring Physician: Dr. Williamson      Admit Date: 2019      PT Recommendation and Plan    Visit Dx:    ICD-10-CM ICD-9-CM   1. Spinal stenosis of lumbar region with neurogenic claudication M48.062 724.03   2. Spinal stenosis, lumbar region, with neurogenic claudication M48.062 724.03   3. Impaired functional mobility and activity tolerance Z74.09 V49.89   4. Decreased activities of daily living (ADL) Z78.9 V49.89   5. Post-op pain G89.18 338.18       Outcome Measures     Row Name 19 1500             How much help from another is currently needed...    Putting on and taking off regular lower body clothing?  2  -MW      Bathing (including washing, rinsing, and drying)  2  -MW      Toileting (which includes using toilet bed pan or urinal)  3  -MW      Putting on and taking off regular upper body clothing  4  -MW      Taking care of personal grooming (such as brushing teeth)  4  -MW      Eating meals  4  -MW      AM-PAC 6 Clicks Score (OT)  19  -MW         Functional Assessment    Outcome Measure Options  AM-PAC 6 Clicks Daily Activity (OT)  -MW        User Key  (r) = Recorded By, (t) = Taken By, (c) = Cosigned By    Initials Name Provider Type    Audrey Lai, OTR/L Occupational Therapist              Rehab Goal Summary     Row Name 20 1149 20 0700          Bed Mobility Goal 1 (PT)    Activity/Assistive Device (Bed Mobility Goal 1, PT)  bed mobility activities, all  -AH  --     [place] Level/Cues Needed (Bed Mobility Goal 1, PT)  independent  -AH  --     Time Frame (Bed Mobility Goal 1, PT)  long term goal (LTG);by discharge  -AH  --     Progress/Outcomes (Bed Mobility Goal 1, PT)  goal met  -  --        Transfer Goal 1 (PT)    Activity/Assistive Device (Transfer Goal 1,  PT)  sit-to-stand/stand-to-sit;bed-to-chair/chair-to-bed  -AH  --     Schleicher Level/Cues Needed (Transfer Goal 1, PT)  independent  -AH  --     Time Frame (Transfer Goal 1, PT)  long term goal (LTG);by discharge  -AH  --     Progress/Outcome (Transfer Goal 1, PT)  goal met  -AH  --        Gait Training Goal 1 (PT)    Activity/Assistive Device (Gait Training Goal 1, PT)  gait (walking locomotion)  -AH  --     Schleicher Level (Gait Training Goal 1, PT)  independent  -AH  --     Distance (Gait Goal 1, PT)  100ft with no LOB  -AH  --     Time Frame (Gait Training Goal 1, PT)  long term goal (LTG);by discharge  -AH  --     Progress/Outcome (Gait Training Goal 1, PT)  goal met  -AH  --        Transfer Goal 1 (OT)    Activity/Assistive Device (Transfer Goal 1, OT)  --  toilet;tub;sit-to-stand/stand-to-sit;bed-to-chair/chair-to-bed  -TS     Schleicher Level/Cues Needed (Transfer Goal 1, OT)  --  standby assist  -TS     Time Frame (Transfer Goal 1, OT)  --  long term goal (LTG);by discharge  -TS     Progress/Outcome (Transfer Goal 1, OT)  --  goal not met  -TS        Dressing Goal 1 (OT)    Activity/Assistive Device (Dressing Goal 1, OT)  --  lower body dressing  -TS     Schleicher/Cues Needed (Dressing Goal 1, OT)  --  minimum assist (75% or more patient effort)  -TS     Time Frame (Dressing Goal 1, OT)  --  long term goal (LTG);by discharge  -TS     Progress/Outcome (Dressing Goal 1, OT)  --  goal not met  -TS        Safety Awareness Goal 1 (OT)    Activity (Safety Awareness Goal 1, OT)  --  impulse control;demonstrates compliance;demonstration of safe behaviors;demonstrates understanding  -TS     Schleicher/Cues/Accuracy (Safety Awareness Goal 1, OT)  --  with minimum;verbal cues/redirection;with 90% accuracy  -TS     Time Frame (Safety Awareness Goal 1, OT)  --  long term goal (LTG);by discharge  -TS     Progress/Outcome (Safety Awareness Goal 1, OT)  --  goal not met  -TS       User Key  (r) = Recorded By,  (t) = Taken By, (c) = Cosigned By    Initials Name Provider Type Discipline    Jamee Veliz PTA Physical Therapy Assistant PT    TS Izabela Castellon, REAVES/L Occupational Therapy Assistant OT              PT Discharge Summary  Reason for Discharge: Discharge from facility  Outcomes Achieved: Refer to plan of care for updates on goals achieved  Discharge Destination: Home      Jamee Fabian PTA   1/3/2020

## 2020-01-04 LAB
BACTERIA SPEC AEROBE CULT: NORMAL
BACTERIA SPEC AEROBE CULT: NORMAL

## 2020-01-04 NOTE — OUTREACH NOTE
Prep Survey      Responses   Facility patient discharged from?  [facility]   Is patient eligible?  Yes   Discharge diagnosis  Spinal stenosis, lumbar region, with neurogenic claudication, CAD, HTN, HLD   Does the patient have one of the following disease processes/diagnoses(primary or secondary)?  General Surgery   Does the patient have Home health ordered?  No   Is there a DME ordered?  No   Comments regarding appointments  Gregg PUGA 1/16 @ 1:45   Medication alerts for this patient  See AVS   Prep survey completed?  Yes          Dunia Flores LPN

## 2020-01-06 NOTE — OP NOTE
NEUROSURGERY OPERATIVE NOTE    Mickey Conde  OR Date: 12/31/2019    Pre-op Diagnosis:   Spinal stenosis, lumbar region, with neurogenic claudication [M48.062]    Post-op Diagnosis:     Post-Op Diagnosis Codes:     * Spinal stenosis, lumbar region, with neurogenic claudication [M48.062]          Surgeon(s):  Stalin Williamson MD    Anesthesia: General    Staff:   Circulator: Reza Ernst RN; Macey Howell RN  Scrub Person: Kimberly Dukes; Rebekah Pizarro  Assistant: Amita Jorge    Procedure(s):  Open LUMBAR LAMINECTOMY L3 and L4 with medial facetectomy and bilateral foraminotomies.      INDICATIONS FOR PROCEDURE:   Mickey Conde is a 84 y.o. male with a significant medical history of prior lumbar surgery by Dr. Brown, level and date unknown, MI, CAD, HTN, BPH, and hyperlipidemia.  He presents with a new known problem of worsening bilateral LE pain that worsens with standing and walking. Physical exam findings of 4/5 left quad weakness, decreased reflexes to the left patella and bilateral Achilles, and an antalgic gait.  Their imaging shows STIR signal changes at the inferior endplate of L3 and superior endplate of L4, retrolisthesis of L5 over S1, multilevel facet arthropathy and ligamentous flavum hypertrophy and spondylosis worse at L3-4 and L4-5 resulting in central canal and bilateral foraminal stenosis at L3-4 and L4-5.    The risks and benefits of the procedure were discussed. The patient accepted and understood all potential risks and complications including infection, CSF leak, hematoma, damage to nerve roots or spinal cord, bowel or bladder incontinence, difficulty walking or weakness.  After considering options, the patient requested that we proceed with the procedure.    DESCRIPTION OF OPERATION:   On the day of surgery, the patient was brought to the preoperative holding area where IV access was obtained. Prophylactic intravenous antibiotics were administered. The patient was  then brought to the major operative suite.     While on the [facility], he underwent an uneventful induction of general anesthetic with placement of endotracheal tube. TEDs and SCD hoses were applied.  The patient was then placed in prone position on a Ezequiel table.  All pressure points were inspected and appropriately padded, and he was secured to the table.  His back was sterilely prepped with alcohol.  ChloraPrep was then applied and allowed to dry for 3 minutes, and the patient was draped in the usual fashion.  At this point a WHO surgical timeout was performed with all members of the surgical team.      With the patient's relevant imaging dominantly displayed, anatomical landmarks were used to approximate the length of the incision.  The skin was infused with quarter percent Marcaine with epinephrine.  A #10 blade was used to incise the skin.  Monopolar cautery was used on the skin and subcutaneous tissues until the fascia was identified. The fascia was incised with a single incision and the lumbar spinous processes were exposed in the subperosteal plane.      At this point a Kocher was placed on the most inferior exposed spinous process (L4). Fluoroscopy was brought into the field and a lateral image was obtained to ensure correct level by counting cranially from S1.     Once confirmed the exposure was widened to include L3 through L4 and retractors were adjusted.  A large Leksell was used to remove the spinous processes L3 through L4 and begin the laminectomies.  A high speed drill was then used to thin the lamina to eggshell thickness from caudal to rostral.  2 mm and 3 mm Kerrisons were used to complete the laminectomy and widen the exposure.  The 3 mm and 4 mm Kerrisons were then used to remove the ligament of flavum centrally.  Once complete, ligament was removed from the lateral gutters.  A nerve hook was then placed into each neuroforamina bilaterally to ensure excellent decompression.      The  wound was then copiously irrigated.  Meticulous hemostasis was again achieved.  A thrombin soaked Gelfoam with preservative-free morphine was placed over the exposed dura.  Retractors were then removed.  A 7-Spanish flat Ezequiel Lizama drain was tunneled and placed in the subfascial plane.  The zero Vicryl sutures were used to close the fascia in a watertight manner.  The subcutaneous tissues were closed with 2-0 Vicryl's.  The skin was closed with Monocryl.  The patient was awoken and found to be at his neurological baseline. He is was successfully extubated and transferred to the postoperative care unit in stable condition.      Estimated Blood Loss: 50 mL    Complications: None    Implants: * No implants in log *    Specimens:                None      Drains:   [REMOVED] Closed/Suction Drain 1 Posterior Back Bulb 7 Fr. (Removed)   Site Description Unable to view 1/2/2020  8:16 AM   Dressing Status Clean;Dry;Intact 1/2/2020  8:16 AM   Drainage Appearance Bloody 1/2/2020  8:16 AM   Status To bulb suction 1/2/2020  8:16 AM   Output (mL) 10 mL 1/2/2020  4:40 AM       [REMOVED] Urethral Catheter Non-latex;Silicone 16 Fr. (Removed)

## 2020-01-07 ENCOUNTER — READMISSION MANAGEMENT (OUTPATIENT)
Dept: CALL CENTER | Facility: HOSPITAL | Age: 85
End: 2020-01-07

## 2020-01-07 NOTE — OUTREACH NOTE
General Surgery Week 1 Survey      Responses   Facility patient discharged from?  [facility]   Does the patient have one of the following disease processes/diagnoses(primary or secondary)?  General Surgery   Is there a successful TCM telephone encounter documented?  No   Week 1 attempt successful?  Yes   Call start time  1009   Call end time  1016   Discharge diagnosis  Spinal stenosis, lumbar region, with neurogenic claudication, CAD, HTN, HLD   Meds reviewed with patient/caregiver?  Yes   Is the patient having any side effects they believe may be caused by any medication additions or changes?  No   Does the patient have all medications related to this admission filled (includes all antibiotics, pain medications, etc.)  Yes   Is the patient taking all medications as directed (includes completed medication regime)?  Yes   Does the patient have a follow up appointment scheduled with their surgeon?  Yes [Sees surgeon on Jan 16 2020]   Has the patient kept scheduled appointments due by today?  N/A   Comments  Seeing PCP today for cough.    Has home health visited the patient within 72 hours of discharge?  N/A   Psychosocial issues?  No   Comments  Patient reports he has cough. Encouraged use of incentive spirometer. Denies fever or chills.    Did the patient receive a copy of their discharge instructions?  Yes   Nursing interventions  Reviewed instructions with patient   What is the patient's perception of their health status since discharge?  Improving   Nursing interventions  Nurse provided patient education   Is the patient /caregiver able to teach back basic post-op care?  Continue use of incentive spirometry at least 1 week post discharge, Practice 'cough and deep breath', Drive as instructed by MD in discharge instructions, Take showers only when approved by MD-sponge bathe until then, No tub bath, swimming, or hot tub until instructed by MD, Keep incision areas clean,dry and protected, Do not remove steri-strips,  Lifting as instructed by MD in discharge instructions   Is the patient/caregiver able to teach back signs and symptoms of incisional infection?  Increased redness, swelling or pain at the incisonal site, Increased drainage or bleeding, Incisional warmth, Pus or odor from incision, Fever   Is the patient/caregiver able to teach back steps to recovery at home?  Set small, achievable goals for return to baseline health, Rest and rebuild strength, gradually increase activity, Make a list of questions for surgeon's appointment   Is the patient/caregiver able to teach back the hierarchy of who to call/visit for symptoms/problems? PCP, Specialist, Home health nurse, Urgent Care, ED, 911  Yes   Week 1 call completed?  Yes          Seven Ny RN

## 2020-01-16 ENCOUNTER — OFFICE VISIT (OUTPATIENT)
Dept: NEUROSURGERY | Facility: CLINIC | Age: 85
End: 2020-01-16

## 2020-01-16 ENCOUNTER — READMISSION MANAGEMENT (OUTPATIENT)
Dept: CALL CENTER | Facility: HOSPITAL | Age: 85
End: 2020-01-16

## 2020-01-16 VITALS — BODY MASS INDEX: 24.71 KG/M2 | WEIGHT: 163 LBS | HEIGHT: 68 IN

## 2020-01-16 DIAGNOSIS — M79.662 PAIN OF LEFT CALF: ICD-10-CM

## 2020-01-16 DIAGNOSIS — M48.062 SPINAL STENOSIS, LUMBAR REGION, WITH NEUROGENIC CLAUDICATION: Primary | ICD-10-CM

## 2020-01-16 DIAGNOSIS — R60.0 BILATERAL LOWER EXTREMITY EDEMA: ICD-10-CM

## 2020-01-16 PROCEDURE — 99024 POSTOP FOLLOW-UP VISIT: CPT | Performed by: NURSE PRACTITIONER

## 2020-01-16 NOTE — PROGRESS NOTES
"Chief complaint:   Chief Complaint   Patient presents with   • Post-op     Patient is here today for 2 week post op Lumbar hemilaminectomy.      Subjective     HPI:   Interval History: Mickey Conde is a 84 y.o.  male who presents today for post operative follow-up from a Left Lumbar Hemilaminectomy Without Fusion At Lumbar 3/4 And 4/5 - Left on 12/31/2019 per Dr. Williamson.  Mr. Conde has done fairly well since we last saw him.  He states his back discomfort is minimal, primarily stiffness that is worse upon waking.  He denies lower extremity numbness or paresthesias.  He does report a \"deep ache\" to the left posterior calf that worsens with walking.  He denies fevers, chills, cough, dyspnea, chest pain, a concern for a postop incision infection, saddle anesthesia, or bowel or bladder dysfunction.  He currently rates the severity of his symptoms 0/10.  No additional concerns at this time.    PFSH:  Past Medical History:   Diagnosis Date   • Benign essential hypertension    • CAD (coronary artery disease)    • CAD in native artery     Story: lexiscan 1/2012 negative for ischemia   • Enlarged prostate    • Hyperlipemia, mixed     Lipid panel (10/2014) 143/48/81/189   • Hyperlipidemia    • Hypertension    • Myocardial infarction (CMS/HCC)      Past Surgical History:   Procedure Laterality Date   • BACK SURGERY     • CAROTID STENT     • LUMBAR LAMINECTOMY Left 12/31/2019    Procedure: LEFT LUMBAR HEMILAMINECTOMY WITHOUT FUSION AT LUMBAR 3/4 AND 4/5;  Surgeon: Stalin Williamson MD;  Location: [facility];  Service: Neurosurgery   • ROTATOR CUFF REPAIR Bilateral    • SHOULDER SURGERY     • TENNIS ELBOW RELEASE       Objective      Current Outpatient Medications   Medication Sig Dispense Refill   • amLODIPine (NORVASC) 10 MG tablet TAKE ONE TABLET BY MOUTH ONCE DAILY 90 tablet 3   • aspirin 81 MG EC tablet Take 1 tablet by mouth Daily.     • lisinopril (PRINIVIL,ZESTRIL) 20 MG tablet TAKE ONE TABLET BY " "MOUTH ONCE DAILY 90 tablet 3   • metFORMIN (GLUCOPHAGE) 500 MG tablet Take 500 mg by mouth Daily.     • Misc Natural Products (OSTEO BI-FLEX ADV DOUBLE ST PO) Take  by mouth.     • simvastatin (ZOCOR) 40 MG tablet TAKE ONE TABLET BY MOUTH EVERY NIGHT. 90 tablet 3   • diazePAM (VALIUM) 5 MG tablet Take 1 tablet by mouth Every 8 (Eight) Hours As Needed for Muscle Spasms. 20 tablet 0   • diclofenac (VOLTAREN) 75 MG EC tablet Take 1 tablet by mouth 2 (Two) Times a Day. 60 tablet 2   • HYDROcodone-acetaminophen (NORCO) 7.5-325 MG per tablet Take 1 tablet by mouth Every 6 (Six) Hours As Needed for Moderate Pain . 56 tablet 0   • metoprolol succinate XL (TOPROL-XL) 25 MG 24 hr tablet TAKE 1 TABLET BY MOUTH ONCE DAILY 90 tablet 3   • sennosides-docusate (PERICOLACE) 8.6-50 MG per tablet Take 2 tablets by mouth Every Night. 60 tablet 0     No current facility-administered medications for this visit.      Vital Signs  Ht 172.7 cm (68\")   Wt 73.9 kg (163 lb)   BMI 24.78 kg/m²   Physical Exam   Constitutional: He is oriented to person, place, and time. He appears well-developed and well-nourished. He is cooperative.  Non-toxic appearance. He does not have a sickly appearance. He does not appear ill. No distress.   BMI 24.8   HENT:   Head: Normocephalic and atraumatic.   Right Ear: Hearing normal.   Left Ear: Hearing normal.   Mouth/Throat: Mucous membranes are normal.   Eyes: Pupils are equal, round, and reactive to light. Conjunctivae and EOM are normal.   Neck: Trachea normal and full passive range of motion without pain. Neck supple.   Cardiovascular: Normal rate and regular rhythm.   Pulses:       Dorsalis pedis pulses are 2+ on the right side, and 2+ on the left side.        Posterior tibial pulses are 2+ on the right side, and 2+ on the left side.   2+ pitting edema to the bilateral lower extremities, appears slightly worse on the left.  Right calf circumference 36 cm, left calf circumference 37.2 cm.  Sparse hair " growth to the bilateral lower extremities.  No signs of cyanosis or heme staining.   Pulmonary/Chest: Effort normal. No accessory muscle usage. No apnea, no tachypnea and no bradypnea. No respiratory distress.   Abdominal: Soft. Normal appearance.   Neurological: He is alert and oriented to person, place, and time. Gait normal. GCS eye subscore is 4. GCS verbal subscore is 5. GCS motor subscore is 6.   Reflex Scores:       Tricep reflexes are 2+ on the right side and 2+ on the left side.       Bicep reflexes are 2+ on the right side and 2+ on the left side.       Brachioradialis reflexes are 2+ on the right side and 2+ on the left side.       Patellar reflexes are 2+ on the right side and 2+ on the left side.       Achilles reflexes are 2+ on the right side and 2+ on the left side.  Skin: Skin is warm, dry and intact.   Psychiatric: He has a normal mood and affect. His speech is normal and behavior is normal.   Nursing note and vitals reviewed.    Neurologic Exam     Mental Status   Oriented to person, place, and time.   Attention: normal. Concentration: normal.   Speech: speech is normal   Level of consciousness: alert    Cranial Nerves     CN II   Visual fields full to confrontation.     CN III, IV, VI   Pupils are equal, round, and reactive to light.  Extraocular motions are normal.     CN V   Facial sensation intact.     CN VII   Facial expression full, symmetric.     CN VIII   CN VIII normal.     CN IX, X   CN IX normal.     CN XI   CN XI normal.     Motor Exam   Muscle bulk: normal  Overall muscle tone: normal  Right arm tone: normal  Left arm tone: normal  Right arm pronator drift: absent  Left arm pronator drift: absent  Right leg tone: normal  Left leg tone: normal    Strength   Right deltoid: 5/5  Left deltoid: 5/5  Right biceps: 5/5  Left biceps: 5/5  Right triceps: 5/5  Left triceps: 5/5  Right wrist extension: 5/5  Left wrist extension: 5/5  Right iliopsoas: 5/5  Left iliopsoas: 5/5  Right quadriceps:  5/5  Left quadriceps: 5/5  Right anterior tibial: 5/5  Left anterior tibial: 5/5  Right posterior tibial: 5/5  Left posterior tibial: 5/5    Sensory Exam   Light touch normal.     Gait, Coordination, and Reflexes     Gait  Gait: normal    Tremor   Resting tremor: absent  Intention tremor: absent  Action tremor: absent    Reflexes   Right brachioradialis: 2+  Left brachioradialis: 2+  Right biceps: 2+  Left biceps: 2+  Right triceps: 2+  Left triceps: 2+  Right patellar: 2+  Left patellar: 2+  Right achilles: 2+  Left achilles: 2+  Right : 4+  Left : 4+  Right plantar: normal  Left plantar: normal  Right Dupree: absent  Left Dupree: absent  Right ankle clonus: absent  Left ankle clonus: absent  Right pendular knee jerk: absent  Left pendular knee jerk: absent    Incision: Scan on 1/16/2020 1411 by Gregg Worrell, APRN: Post op back  (Consent to obtain photo of postoperative site for documentation purposes only obtained verbally by Mr. Conde)    Results Review: no new imaging      Assessment/Plan:   1. Spinal stenosis, lumbar region, with neurogenic claudication    2. Bilateral lower extremity edema    3. Pain of left calf      Mr. Conde has done fairly well since we last saw him.  He presents today for post operative wound check.  His back pain is minimal with reports of only stiffness that is more noticeable upon waking.  His post operative incision is clean, dry, and intact.  No wound drainage or discharge noted.  No signs of soft tissue infection.  He may continue current pain medications with tapering dosages as previously instructed.  B/R/AE discussed.  Given his recent surgery, decrease in physical mobility, complaint of left posterior calf pain, and assessment findings of bilateral lower extremity edema, slightly more noticeable on the left, and left greater than right calf circumference, I am concerned that Mr. Conde may have developed a DVT.  My concern was shared with both patient and family  and they have agreed to proceed with an ultrasound of the bilateral lower extremities to rule out deep or superficial thrombus.  We will notify the patient of any abnormal results and proceed accordingly.  Otherwise, I advised the patient to keep scheduled appointment with Dr. Williamson for reassessment.  Call to return sooner for any additional concerns.      Mickey was seen today for post-op.    Diagnoses and all orders for this visit:    Spinal stenosis, lumbar region, with neurogenic claudication    Bilateral lower extremity edema  -     US venous doppler lower extremity bilateral (duplex); Future    Pain of left calf  -     US venous doppler lower extremity bilateral (duplex); Future      Return for Keep scheduled appointment with Dr. Williamson, Next scheduled follow up.  I discussed the patients findings and my recommendations with patient    VIVIEN Keene

## 2020-01-16 NOTE — OUTREACH NOTE
General Surgery Week 2 Survey      Responses   Facility patient discharged from?  [facility]   Does the patient have one of the following disease processes/diagnoses(primary or secondary)?  General Surgery   Week 2 attempt successful?  Yes   Call start time  1126   Call end time  1129   Discharge diagnosis  Spinal stenosis, lumbar region, with neurogenic claudication, CAD, HTN, HLD   Person spoke with today (if not patient) and relationship  Harish-son   Meds reviewed with patient/caregiver?  Yes   Is the patient taking all medications as directed (includes completed medication regime)?  Yes   Has the patient kept scheduled appointments due by today?  Yes   Psychosocial issues?  No   What is the patient's perception of their health status since discharge?  Improving   Nursing interventions  Nurse provided patient education   Is the patient /caregiver able to teach back basic post-op care?  No tub bath, swimming, or hot tub until instructed by MD, Take showers only when approved by MD-sponge bathe until then, Lifting as instructed by MD in discharge instructions   Is the patient/caregiver able to teach back signs and symptoms of incisional infection?  Fever   Is the patient/caregiver able to teach back steps to recovery at home?  Eat a well-balance diet   If the patient is a current smoker, are they able to teach back resources for cessation?  -- [Pt is a nonsmoker]   Week 2 call completed?  Yes          Alessia Brown RN

## 2020-01-20 ENCOUNTER — HOSPITAL ENCOUNTER (OUTPATIENT)
Dept: ULTRASOUND IMAGING | Facility: HOSPITAL | Age: 85
Discharge: HOME | End: 2020-01-20
Admitting: NURSE PRACTITIONER

## 2020-01-20 DIAGNOSIS — R60.0 BILATERAL LOWER EXTREMITY EDEMA: ICD-10-CM

## 2020-01-20 DIAGNOSIS — M79.662 PAIN OF LEFT CALF: ICD-10-CM

## 2020-01-20 PROCEDURE — 93970 EXTREMITY STUDY: CPT | Performed by: SURGERY

## 2020-01-20 PROCEDURE — 93970 EXTREMITY STUDY: CPT

## 2020-01-23 ENCOUNTER — READMISSION MANAGEMENT (OUTPATIENT)
Dept: CALL CENTER | Facility: HOSPITAL | Age: 85
End: 2020-01-23

## 2020-01-23 NOTE — OUTREACH NOTE
General Surgery Week 3 Survey      Responses   Facility patient discharged from?  [facility]   Does the patient have one of the following disease processes/diagnoses(primary or secondary)?  General Surgery   Week 3 attempt successful?  Yes   Call start time  1542   Call end time  1544   Discharge diagnosis  Spinal stenosis, lumbar region, with neurogenic claudication, CAD, HTN, HLD   Meds reviewed with patient/caregiver?  Yes   Is the patient taking all medications as directed (includes completed medication regime)?  Yes   Has the patient kept scheduled appointments due by today?  Yes   What is the patient's perception of their health status since discharge?  Improving   Week 3 call completed?  Yes   Graduated/Revoked comments  Goals met.           Mary Silveira RN

## 2020-01-31 ENCOUNTER — READMISSION MANAGEMENT (OUTPATIENT)
Dept: CALL CENTER | Facility: HOSPITAL | Age: 85
End: 2020-01-31

## 2020-01-31 NOTE — OUTREACH NOTE
General Surgery Week 4 Survey      Responses   Facility patient discharged from?  [facility]   Does the patient have one of the following disease processes/diagnoses(primary or secondary)?  General Surgery   Week 4 attempt successful?  Yes   Call start time  1440   Call end time  1441   Discharge diagnosis  Spinal stenosis, lumbar region, with neurogenic claudication, CAD, HTN, HLD   Is the patient taking all medications as directed (includes completed medication regime)?  Yes   Has the patient kept scheduled appointments due by today?  Yes   Is the patient still receiving Home Health Services?  N/A   Psychosocial issues?  No   What is the patient's perception of their health status since discharge?  Returned to baseline/stable   Nursing interventions  Nurse provided patient education   Is the patient/caregiver able to teach back steps to recovery at home?  Eat a well-balance diet   Week 4 call completed?  Yes   Would the patient like one additional call?  No   Graduated  Yes   Did the patient feel the follow up calls were helpful during their recovery period?  Yes   Was the number of calls appropriate?  Yes          Alessia Brown RN

## 2020-02-24 ENCOUNTER — OFFICE VISIT (OUTPATIENT)
Dept: CARDIOLOGY | Facility: CLINIC | Age: 85
End: 2020-02-24

## 2020-02-24 VITALS
WEIGHT: 170 LBS | SYSTOLIC BLOOD PRESSURE: 151 MMHG | HEIGHT: 68 IN | HEART RATE: 68 BPM | OXYGEN SATURATION: 98 % | BODY MASS INDEX: 25.76 KG/M2 | DIASTOLIC BLOOD PRESSURE: 78 MMHG

## 2020-02-24 DIAGNOSIS — I25.10 CORONARY ARTERY DISEASE INVOLVING NATIVE CORONARY ARTERY OF NATIVE HEART WITHOUT ANGINA PECTORIS: Primary | ICD-10-CM

## 2020-02-24 DIAGNOSIS — Z95.5 HISTORY OF CORONARY ARTERY STENT PLACEMENT: ICD-10-CM

## 2020-02-24 DIAGNOSIS — R60.0 BILATERAL LOWER EXTREMITY EDEMA: ICD-10-CM

## 2020-02-24 DIAGNOSIS — E78.2 MIXED HYPERLIPIDEMIA: ICD-10-CM

## 2020-02-24 DIAGNOSIS — I10 ESSENTIAL HYPERTENSION: ICD-10-CM

## 2020-02-24 PROCEDURE — 99213 OFFICE O/P EST LOW 20 MIN: CPT | Performed by: NURSE PRACTITIONER

## 2020-02-24 RX ORDER — CHLORAL HYDRATE 500 MG
CAPSULE ORAL
COMMUNITY
End: 2020-02-26

## 2020-02-24 RX ORDER — NAPROXEN SODIUM 220 MG
220 TABLET ORAL 2 TIMES DAILY PRN
COMMUNITY
End: 2020-02-26

## 2020-02-24 NOTE — PROGRESS NOTES
Subjective:     Encounter Date:02/24/2020      Patient ID: Mickey Conde is a 84 y.o. male. He presents today for routine follow up. He has a history of coronary artery disease with remote stenting, hypertension and hyperlipidemia.  He reports a 6-month history of bilateral lower extremity edema.  He denies chest pain, shortness of breath, palpitations, dizziness, syncope, orthopnea, PND or decreased stamina. He denies any signs of bleeding. He reports that cholesterol is managed by PCP and is reported to be well controlled on statin. He reports routinely monitoring blood pressure at home with readings averaging in the 130s/140s/70s-80s. He states that overall he has been well since his last visit.     Chief Complaint: routine follow up  Coronary Artery Disease   Presents for follow-up visit. Symptoms include leg swelling. Pertinent negatives include no chest pain, chest pressure, chest tightness, dizziness, muscle weakness, palpitations, shortness of breath or weight gain. Risk factors include hyperlipidemia. The symptoms have been stable. Compliance with diet is variable. Compliance with exercise is variable. Compliance with medications is good.   Hypertension   This is a chronic problem. The current episode started more than 1 year ago. The problem is uncontrolled. Pertinent negatives include no anxiety, blurred vision, chest pain, headaches, malaise/fatigue, neck pain, orthopnea, palpitations, peripheral edema, PND, shortness of breath or sweats. Risk factors for coronary artery disease include male gender and dyslipidemia. Current antihypertension treatment includes ACE inhibitors and calcium channel blockers. The current treatment provides moderate improvement. Hypertensive end-organ damage includes CAD/MI.   Hyperlipidemia   This is a chronic problem. The current episode started more than 1 year ago. Pertinent negatives include no chest pain or shortness of breath. Current antihyperlipidemic treatment  includes statins. Risk factors for coronary artery disease include male sex, hypertension and dyslipidemia.       The following portions of the patient's history were reviewed and updated as appropriate: allergies, current medications, past family history, past medical history, past social history, past surgical history and problem list.     Allergies   Allergen Reactions   • Penicillins Rash       Current Outpatient Medications:   •  amLODIPine (NORVASC) 10 MG tablet, TAKE ONE TABLET BY MOUTH ONCE DAILY, Disp: 90 tablet, Rfl: 3  •  aspirin 81 MG EC tablet, Take 1 tablet by mouth Daily., Disp: , Rfl:   •  lisinopril (PRINIVIL,ZESTRIL) 20 MG tablet, TAKE ONE TABLET BY MOUTH ONCE DAILY, Disp: 90 tablet, Rfl: 3  •  metFORMIN (GLUCOPHAGE) 500 MG tablet, Take 500 mg by mouth Daily., Disp: , Rfl:   •  metoprolol succinate XL (TOPROL-XL) 25 MG 24 hr tablet, TAKE 1 TABLET BY MOUTH ONCE DAILY, Disp: 90 tablet, Rfl: 3  •  Misc Natural Products (OSTEO BI-FLEX ADV DOUBLE ST PO), Take  by mouth., Disp: , Rfl:   •  naproxen sodium (ALEVE) 220 MG tablet, Take 220 mg by mouth 2 (Two) Times a Day As Needed., Disp: , Rfl:   •  Omega-3 Fatty Acids (FISH OIL) 1000 MG capsule capsule, Take  by mouth Daily With Breakfast., Disp: , Rfl:   •  simvastatin (ZOCOR) 40 MG tablet, TAKE ONE TABLET BY MOUTH EVERY NIGHT., Disp: 90 tablet, Rfl: 3  •  diazePAM (VALIUM) 5 MG tablet, Take 1 tablet by mouth Every 8 (Eight) Hours As Needed for Muscle Spasms., Disp: 20 tablet, Rfl: 0  •  diclofenac (VOLTAREN) 75 MG EC tablet, Take 1 tablet by mouth 2 (Two) Times a Day., Disp: 60 tablet, Rfl: 2  •  HYDROcodone-acetaminophen (NORCO) 7.5-325 MG per tablet, Take 1 tablet by mouth Every 6 (Six) Hours As Needed for Moderate Pain ., Disp: 56 tablet, Rfl: 0  •  sennosides-docusate (PERICOLACE) 8.6-50 MG per tablet, Take 2 tablets by mouth Every Night., Disp: 60 tablet, Rfl: 0  Past Medical History:   Diagnosis Date   • Benign essential hypertension    • CAD  (coronary artery disease)    • CAD in native artery     Story: lexiscan 2012 negative for ischemia   • Enlarged prostate    • Hyperlipemia, mixed     Lipid panel (10/2014) 143/48/81/189   • Hyperlipidemia    • Hypertension    • Myocardial infarction (CMS/HCC)      Past Surgical History:   Procedure Laterality Date   • BACK SURGERY     • CAROTID STENT     • LUMBAR LAMINECTOMY Left 2019    Procedure: LEFT LUMBAR HEMILAMINECTOMY WITHOUT FUSION AT LUMBAR 3/4 AND 4/5;  Surgeon: Stalin Williamson MD;  Location: [facility] OR;  Service: Neurosurgery   • ROTATOR CUFF REPAIR Bilateral    • SHOULDER SURGERY     • TENNIS ELBOW RELEASE       Family History   Problem Relation Age of Onset   • ALS Mother    • Heart attack Father    • Alzheimer's disease Maternal Grandfather    • Heart attack Maternal Grandfather    • Heart attack Paternal Grandfather      Social History     Socioeconomic History   • Marital status:      Spouse name: Not on file   • Number of children: Not on file   • Years of education: Not on file   • Highest education level: Not on file   Tobacco Use   • Smoking status: Former Smoker     Types: Cigarettes     Start date:      Last attempt to quit:      Years since quittin.1   • Smokeless tobacco: Never Used   Substance and Sexual Activity   • Alcohol use: No   • Drug use: No   • Sexual activity: Defer         Review of Systems   Constitution: Negative for chills, decreased appetite, fever, malaise/fatigue, night sweats, weight gain and weight loss.   HENT: Negative for nosebleeds.    Eyes: Negative for blurred vision and visual disturbance.   Cardiovascular: Positive for leg swelling. Negative for chest pain, dyspnea on exertion, near-syncope, orthopnea, palpitations, paroxysmal nocturnal dyspnea and syncope.   Respiratory: Negative for chest tightness, cough, hemoptysis, shortness of breath, snoring and wheezing.    Endocrine: Negative for cold intolerance and heat intolerance.  "  Hematologic/Lymphatic: Does not bruise/bleed easily.   Skin: Negative for rash.   Musculoskeletal: Negative for back pain, falls, muscle weakness and neck pain.   Gastrointestinal: Negative for abdominal pain, change in bowel habit, constipation, diarrhea, dysphagia, heartburn, nausea and vomiting.   Genitourinary: Negative for hematuria.   Neurological: Negative for dizziness, headaches, light-headedness and weakness.   Psychiatric/Behavioral: Negative for altered mental status.   Allergic/Immunologic: Negative for persistent infections.       ProceduresBP 151/78   Pulse 68   Ht 172.7 cm (68\")   Wt 77.1 kg (170 lb)   SpO2 98%   BMI 25.85 kg/m²          Objective:     Physical Exam   Constitutional: He is oriented to person, place, and time. Vital signs are normal. He appears well-developed and well-nourished. No distress.   HENT:   Head: Normocephalic and atraumatic.   Right Ear: External ear normal.   Left Ear: External ear normal.   Eyes: Pupils are equal, round, and reactive to light. Conjunctivae are normal. Right eye exhibits no discharge. Left eye exhibits no discharge.   Neck: Normal range of motion. Neck supple. No JVD present. Carotid bruit is not present. No thyromegaly present.   Cardiovascular: Normal rate, regular rhythm, normal heart sounds and intact distal pulses. PMI is not displaced. Exam reveals no gallop, no friction rub and no decreased pulses.   No murmur heard.  Pulses:       Radial pulses are 2+ on the right side, and 2+ on the left side.        Dorsalis pedis pulses are 2+ on the right side, and 2+ on the left side.        Posterior tibial pulses are 2+ on the right side, and 2+ on the left side.   Pulmonary/Chest: Effort normal and breath sounds normal. No respiratory distress. He has no decreased breath sounds. He has no wheezes. He has no rhonchi. He has no rales. He exhibits no tenderness.   Abdominal: Soft. Bowel sounds are normal. He exhibits no distension. There is no " tenderness.   Musculoskeletal: Normal range of motion. He exhibits edema (3+ pitting edema in bilateral lower extremities).   Neurological: He is alert and oriented to person, place, and time.   Skin: Skin is warm and dry. No rash noted. He is not diaphoretic. No erythema. No pallor.   Psychiatric: He has a normal mood and affect. His behavior is normal. Judgment and thought content normal.   Vitals reviewed.      Lab Review:   Lab Results   Component Value Date    WBC 9.76 12/30/2019    HGB 15.4 12/30/2019    HCT 44.5 12/30/2019    MCV 84.1 12/30/2019     12/30/2019     Lab Results   Component Value Date    GLUCOSE 117 (H) 12/30/2019    BUN 13 12/30/2019    CREATININE 0.53 (L) 12/30/2019    EGFRIFNONA 148 12/30/2019    BCR 24.5 12/30/2019    K 3.4 (L) 12/30/2019    CO2 29.0 12/30/2019    CALCIUM 9.0 12/30/2019    ALBUMIN 4.40 12/30/2019    AST 13 12/30/2019    ALT 16 12/30/2019     Lab Results   Component Value Date    CHOL 123 (L) 04/18/2018    CHOL 135 02/02/2018    CHLPL 143 10/29/2014     Lab Results   Component Value Date    TRIG 145 04/18/2018    TRIG 65 02/02/2018    TRIG 189 (H) 10/29/2014     Lab Results   Component Value Date    HDL 40 04/18/2018    HDL 59 02/02/2018    HDL 48 10/29/2014     Lab Results   Component Value Date    LDL 61 04/18/2018    LDL 70 02/02/2018    LDL 81 10/29/2014           Assessment:          Diagnosis Plan   1. Coronary artery disease involving native coronary artery of native heart without angina pectoris  No clinical signs of ischemia.    2. History of coronary artery stent placement  Continues on aspirin. Denies bleeding.    3. Essential hypertension  Borderline in office today. Pt reports labile. Pt to monitor at home and bring log to next visit.    4. Mixed hyperlipidemia  Well controlled. Managed by PCP. On statin.    5. Bilateral lower extremity edema 2D echo.           Plan:       1. Continue medications as previously prescribed.  2. Report any worsening  symptoms.  3. Report any signs of bleeding.  4. Continue heart healthy diet and regular exercise as tolerated.   5. Follow up with PCP for blood pressure and cholesterol management and routine lab work.  6. Follow up with mid-level provider in one month, or sooner if needed.   7. 2D echo.

## 2020-02-26 ENCOUNTER — OFFICE VISIT (OUTPATIENT)
Dept: NEUROSURGERY | Facility: CLINIC | Age: 85
End: 2020-02-26

## 2020-02-26 DIAGNOSIS — E66.3 OVERWEIGHT (BMI 25.0-29.9): ICD-10-CM

## 2020-02-26 DIAGNOSIS — Z87.891 FORMER SMOKER: ICD-10-CM

## 2020-02-26 DIAGNOSIS — M48.062 SPINAL STENOSIS, LUMBAR REGION, WITH NEUROGENIC CLAUDICATION: Primary | ICD-10-CM

## 2020-02-26 PROCEDURE — 99024 POSTOP FOLLOW-UP VISIT: CPT | Performed by: NEUROLOGICAL SURGERY
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
## 2020-02-26 NOTE — PATIENT INSTRUCTIONS
"PATIENT TO CONTINUE TO FOLLOW UP WITH HIS/HER PRIMARY CARE PROVIDER FOR YEARLY PHYSICAL EXAMS TO ENSURE COMPLETE HEALTH MAINTENANCE      DASH Eating Plan  DASH stands for \"Dietary Approaches to Stop Hypertension.\" The DASH eating plan is a healthy eating plan that has been shown to reduce high blood pressure (hypertension). It may also reduce your risk for type 2 diabetes, heart disease, and stroke. The DASH eating plan may also help with weight loss.  What are tips for following this plan?    General guidelines  · Avoid eating more than 2,300 mg (milligrams) of salt (sodium) a day. If you have hypertension, you may need to reduce your sodium intake to 1,500 mg a day.  · Limit alcohol intake to no more than 1 drink a day for nonpregnant women and 2 drinks a day for men. One drink equals 12 oz of beer, 5 oz of wine, or 1½ oz of hard liquor.  · Work with your health care provider to maintain a healthy body weight or to lose weight. Ask what an ideal weight is for you.  · Get at least 30 minutes of exercise that causes your heart to beat faster (aerobic exercise) most days of the week. Activities may include walking, swimming, or biking.  · Work with your health care provider or diet and nutrition specialist (dietitian) to adjust your eating plan to your individual calorie needs.  Reading food labels    · Check food labels for the amount of sodium per serving. Choose foods with less than 5 percent of the Daily Value of sodium. Generally, foods with less than 300 mg of sodium per serving fit into this eating plan.  · To find whole grains, look for the word \"whole\" as the first word in the ingredient list.  Shopping  · Buy products labeled as \"low-sodium\" or \"no salt added.\"  · Buy fresh foods. Avoid canned foods and premade or frozen meals.  Cooking  · Avoid adding salt when cooking. Use salt-free seasonings or herbs instead of table salt or sea salt. Check with your health care provider or pharmacist before using salt " substitutes.  · Do not pal foods. Cook foods using healthy methods such as baking, boiling, grilling, and broiling instead.  · Cook with heart-healthy oils, such as olive, canola, soybean, or sunflower oil.  Meal planning  · Eat a balanced diet that includes:  ? 5 or more servings of fruits and vegetables each day. At each meal, try to fill half of your plate with fruits and vegetables.  ? Up to 6-8 servings of whole grains each day.  ? Less than 6 oz of lean meat, poultry, or fish each day. A 3-oz serving of meat is about the same size as a deck of cards. One egg equals 1 oz.  ? 2 servings of low-fat dairy each day.  ? A serving of nuts, seeds, or beans 5 times each week.  ? Heart-healthy fats. Healthy fats called Omega-3 fatty acids are found in foods such as flaxseeds and coldwater fish, like sardines, salmon, and mackerel.  · Limit how much you eat of the following:  ? Canned or prepackaged foods.  ? Food that is high in trans fat, such as fried foods.  ? Food that is high in saturated fat, such as fatty meat.  ? Sweets, desserts, sugary drinks, and other foods with added sugar.  ? Full-fat dairy products.  · Do not salt foods before eating.  · Try to eat at least 2 vegetarian meals each week.  · Eat more home-cooked food and less restaurant, buffet, and fast food.  · When eating at a restaurant, ask that your food be prepared with less salt or no salt, if possible.  What foods are recommended?  The items listed may not be a complete list. Talk with your dietitian about what dietary choices are best for you.  Grains  Whole-grain or whole-wheat bread. Whole-grain or whole-wheat pasta. Brown rice. Oatmeal. Quinoa. Bulgur. Whole-grain and low-sodium cereals. Mary bread. Low-fat, low-sodium crackers. Whole-wheat flour tortillas.  Vegetables  Fresh or frozen vegetables (raw, steamed, roasted, or grilled). Low-sodium or reduced-sodium tomato and vegetable juice. Low-sodium or reduced-sodium tomato sauce and tomato  paste. Low-sodium or reduced-sodium canned vegetables.  Fruits  All fresh, dried, or frozen fruit. Canned fruit in natural juice (without added sugar).  Meat and other protein foods  Skinless chicken or turkey. Ground chicken or turkey. Pork with fat trimmed off. Fish and seafood. Egg whites. Dried beans, peas, or lentils. Unsalted nuts, nut butters, and seeds. Unsalted canned beans. Lean cuts of beef with fat trimmed off. Low-sodium, lean deli meat.  Dairy  Low-fat (1%) or fat-free (skim) milk. Fat-free, low-fat, or reduced-fat cheeses. Nonfat, low-sodium ricotta or cottage cheese. Low-fat or nonfat yogurt. Low-fat, low-sodium cheese.  Fats and oils  Soft margarine without trans fats. Vegetable oil. Low-fat, reduced-fat, or light mayonnaise and salad dressings (reduced-sodium). Canola, safflower, olive, soybean, and sunflower oils. Avocado.  Seasoning and other foods  Herbs. Spices. Seasoning mixes without salt. Unsalted popcorn and pretzels. Fat-free sweets.  What foods are not recommended?  The items listed may not be a complete list. Talk with your dietitian about what dietary choices are best for you.  Grains  Baked goods made with fat, such as croissants, muffins, or some breads. Dry pasta or rice meal packs.  Vegetables  Creamed or fried vegetables. Vegetables in a cheese sauce. Regular canned vegetables (not low-sodium or reduced-sodium). Regular canned tomato sauce and paste (not low-sodium or reduced-sodium). Regular tomato and vegetable juice (not low-sodium or reduced-sodium). Pickles. Olives.  Fruits  Canned fruit in a light or heavy syrup. Fried fruit. Fruit in cream or butter sauce.  Meat and other protein foods  Fatty cuts of meat. Ribs. Fried meat. Caro. Sausage. Bologna and other processed lunch meats. Salami. Fatback. Hotdogs. Bratwurst. Salted nuts and seeds. Canned beans with added salt. Canned or smoked fish. Whole eggs or egg yolks. Chicken or turkey with skin.  Dairy  Whole or 2% milk,  cream, and half-and-half. Whole or full-fat cream cheese. Whole-fat or sweetened yogurt. Full-fat cheese. Nondairy creamers. Whipped toppings. Processed cheese and cheese spreads.  Fats and oils  Butter. Stick margarine. Lard. Shortening. Ghee. Caro fat. Tropical oils, such as coconut, palm kernel, or palm oil.  Seasoning and other foods  Salted popcorn and pretzels. Onion salt, garlic salt, seasoned salt, table salt, and sea salt. Worcestershire sauce. Tartar sauce. Barbecue sauce. Teriyaki sauce. Soy sauce, including reduced-sodium. Steak sauce. Canned and packaged gravies. Fish sauce. Oyster sauce. Cocktail sauce. Horseradish that you find on the shelf. Ketchup. Mustard. Meat flavorings and tenderizers. Bouillon cubes. Hot sauce and Tabasco sauce. Premade or packaged marinades. Premade or packaged taco seasonings. Relishes. Regular salad dressings.  Where to find more information:  · National Heart, [employer], and [employer] [place]: www.nhlbi.nih.gov  · American Heart Association: www.heart.org  Summary  · The DASH eating plan is a healthy eating plan that has been shown to reduce high blood pressure (hypertension). It may also reduce your risk for type 2 diabetes, heart disease, and stroke.  · With the DASH eating plan, you should limit salt (sodium) intake to 2,300 mg a day. If you have hypertension, you may need to reduce your sodium intake to 1,500 mg a day.  · When on the DASH eating plan, aim to eat more fresh fruits and vegetables, whole grains, lean proteins, low-fat dairy, and heart-healthy fats.  · Work with your health care provider or diet and nutrition specialist (dietitian) to adjust your eating plan to your individual calorie needs.  This information is not intended to replace advice given to you by your health care provider. Make sure you discuss any questions you have with your health care provider.  Document Released: 12/06/2012 Document Revised: 12/11/2017 Document Reviewed: 12/11/2017  [employer]  [employer] Patient Education © 2020 [employer]

## 2020-03-04 ENCOUNTER — TREATMENT (OUTPATIENT)
Dept: PHYSICAL THERAPY | Facility: CLINIC | Age: 85
End: 2020-03-04

## 2020-03-04 ENCOUNTER — HOSPITAL ENCOUNTER (OUTPATIENT)
Dept: CARDIOLOGY | Facility: HOSPITAL | Age: 85
Discharge: HOME | End: 2020-03-04
Admitting: NURSE PRACTITIONER

## 2020-03-04 VITALS
WEIGHT: 169.97 LBS | HEIGHT: 68 IN | SYSTOLIC BLOOD PRESSURE: 151 MMHG | DIASTOLIC BLOOD PRESSURE: 78 MMHG | BODY MASS INDEX: 25.76 KG/M2

## 2020-03-04 DIAGNOSIS — R53.1 GENERALIZED WEAKNESS: ICD-10-CM

## 2020-03-04 DIAGNOSIS — R60.0 BILATERAL LOWER EXTREMITY EDEMA: ICD-10-CM

## 2020-03-04 DIAGNOSIS — G89.29 CHRONIC MIDLINE LOW BACK PAIN WITHOUT SCIATICA: Primary | ICD-10-CM

## 2020-03-04 DIAGNOSIS — M54.50 CHRONIC MIDLINE LOW BACK PAIN WITHOUT SCIATICA: Primary | ICD-10-CM

## 2020-03-04 LAB
BH CV ECHO MEAS - AO MAX PG (FULL): 2.8 MMHG
BH CV ECHO MEAS - AO MAX PG: 5.5 MMHG
BH CV ECHO MEAS - AO MEAN PG (FULL): 2.5 MMHG
BH CV ECHO MEAS - AO MEAN PG: 3.5 MMHG
BH CV ECHO MEAS - AO ROOT AREA (BSA CORRECTED): 1.5
BH CV ECHO MEAS - AO ROOT AREA: 6.6 CM^2
BH CV ECHO MEAS - AO ROOT DIAM: 2.9 CM
BH CV ECHO MEAS - AO V2 MAX: 117 CM/SEC
BH CV ECHO MEAS - AO V2 MEAN: 88.1 CM/SEC
BH CV ECHO MEAS - AO V2 VTI: 27.5 CM
BH CV ECHO MEAS - AVA(I,A): 2.8 CM^2
BH CV ECHO MEAS - AVA(I,D): 2.8 CM^2
BH CV ECHO MEAS - AVA(V,A): 2.9 CM^2
BH CV ECHO MEAS - AVA(V,D): 2.9 CM^2
BH CV ECHO MEAS - BSA(HAYCOCK): 1.9 M^2
BH CV ECHO MEAS - BSA: 1.9 M^2
BH CV ECHO MEAS - BZI_BMI: 25.8 KILOGRAMS/M^2
BH CV ECHO MEAS - BZI_METRIC_HEIGHT: 172.7 CM
BH CV ECHO MEAS - BZI_METRIC_WEIGHT: 77.1 KG
BH CV ECHO MEAS - EDV(CUBED): 17 ML
BH CV ECHO MEAS - EDV(MOD-SP4): 117 ML
BH CV ECHO MEAS - EDV(TEICH): 23.9 ML
BH CV ECHO MEAS - EF(CUBED): 64.5 %
BH CV ECHO MEAS - EF(MOD-SP4): 64.9 %
BH CV ECHO MEAS - EF(TEICH): 58.2 %
BH CV ECHO MEAS - ESV(CUBED): 6 ML
BH CV ECHO MEAS - ESV(MOD-SP4): 41.1 ML
BH CV ECHO MEAS - ESV(TEICH): 10 ML
BH CV ECHO MEAS - FS: 29.2 %
BH CV ECHO MEAS - IVS/LVPW: 0.86
BH CV ECHO MEAS - IVSD: 1.1 CM
BH CV ECHO MEAS - LA DIMENSION: 3.2 CM
BH CV ECHO MEAS - LA/AO: 1.1
BH CV ECHO MEAS - LAT PEAK E' VEL: 6.7 CM/SEC
BH CV ECHO MEAS - LV DIASTOLIC VOL/BSA (35-75): 61.3 ML/M^2
BH CV ECHO MEAS - LV MASS(C)D: 82 GRAMS
BH CV ECHO MEAS - LV MASS(C)DI: 43 GRAMS/M^2
BH CV ECHO MEAS - LV MAX PG: 2.6 MMHG
BH CV ECHO MEAS - LV MEAN PG: 1 MMHG
BH CV ECHO MEAS - LV SYSTOLIC VOL/BSA (12-30): 21.5 ML/M^2
BH CV ECHO MEAS - LV V1 MAX: 81.3 CM/SEC
BH CV ECHO MEAS - LV V1 MEAN: 52.1 CM/SEC
BH CV ECHO MEAS - LV V1 VTI: 18.6 CM
BH CV ECHO MEAS - LVIDD: 2.6 CM
BH CV ECHO MEAS - LVIDS: 1.8 CM
BH CV ECHO MEAS - LVLD AP4: 8.1 CM
BH CV ECHO MEAS - LVLS AP4: 6.8 CM
BH CV ECHO MEAS - LVOT AREA (M): 4.2 CM^2
BH CV ECHO MEAS - LVOT AREA: 4.2 CM^2
BH CV ECHO MEAS - LVOT DIAM: 2.3 CM
BH CV ECHO MEAS - LVPWD: 1.2 CM
BH CV ECHO MEAS - MR MAX PG: 41.2 MMHG
BH CV ECHO MEAS - MR MAX VEL: 321 CM/SEC
BH CV ECHO MEAS - MV A MAX VEL: 80 CM/SEC
BH CV ECHO MEAS - MV DEC SLOPE: 260 CM/SEC^2
BH CV ECHO MEAS - MV DEC TIME: 0.18 SEC
BH CV ECHO MEAS - MV E MAX VEL: 25.8 CM/SEC
BH CV ECHO MEAS - MV E/A: 0.32
BH CV ECHO MEAS - SI(AO): 95.1 ML/M^2
BH CV ECHO MEAS - SI(CUBED): 5.7 ML/M^2
BH CV ECHO MEAS - SI(LVOT): 40.5 ML/M^2
BH CV ECHO MEAS - SI(MOD-SP4): 39.8 ML/M^2
BH CV ECHO MEAS - SI(TEICH): 7.3 ML/M^2
BH CV ECHO MEAS - SV(AO): 181.3 ML
BH CV ECHO MEAS - SV(CUBED): 10.9 ML
BH CV ECHO MEAS - SV(LVOT): 77.3 ML
BH CV ECHO MEAS - SV(MOD-SP4): 75.9 ML
BH CV ECHO MEAS - SV(TEICH): 13.9 ML
LEFT ATRIUM VOLUME INDEX: 22.8 ML/M2
LEFT ATRIUM VOLUME: 43.5 CM3
LV EF 2D ECHO EST: 65 %

## 2020-03-04 PROCEDURE — 93306 TTE W/DOPPLER COMPLETE: CPT

## 2020-03-04 PROCEDURE — 93306 TTE W/DOPPLER COMPLETE: CPT | Performed by: INTERNAL MEDICINE

## 2020-03-04 PROCEDURE — 97161 PT EVAL LOW COMPLEX 20 MIN: CPT | Performed by: PHYSICAL THERAPIST

## 2020-03-04 PROCEDURE — 25010000002 PERFLUTREN 6.52 MG/ML SUSPENSION: Performed by: INTERNAL MEDICINE

## 2020-03-04 RX ADMIN — PERFLUTREN 8.48 MG: 6.52 INJECTION, SUSPENSION INTRAVENOUS at 12:38

## 2020-03-04 NOTE — PROGRESS NOTES
Outpatient Physical Therapy Ortho Initial Evaluation       Patient Name: Mickey Conde  : 1935  MRN: [id number]  Today's Date: 3/4/2020      Visit Date: 2020    Patient Active Problem List   Diagnosis   • CAD (coronary artery disease)   • HTN (hypertension)   • Hyperlipidemia   • Myocardial infarct (CMS/HCC)   • Chest pain   • History of coronary artery stent placement   • Degeneration of lumbar or lumbosacral intervertebral disc   • Former smoker   • Overweight (BMI 25.0-29.9)   • Spinal stenosis, lumbar region, with neurogenic claudication   • Bilateral lower extremity edema        Past Medical History:   Diagnosis Date   • Benign essential hypertension    • CAD (coronary artery disease)    • CAD in native artery     Story: lexiscan 2012 negative for ischemia   • Enlarged prostate    • Hyperlipemia, mixed     Lipid panel (10/2014) 143/48/81/189   • Hyperlipidemia    • Hypertension    • Myocardial infarction (CMS/HCC)         Past Surgical History:   Procedure Laterality Date   • BACK SURGERY     • CAROTID STENT     • LUMBAR LAMINECTOMY Left 2019    Procedure: LEFT LUMBAR HEMILAMINECTOMY WITHOUT FUSION AT LUMBAR 3/4 AND 4/5;  Surgeon: Stalin Williamson MD;  Location: [facility];  Service: Neurosurgery   • ROTATOR CUFF REPAIR Bilateral    • SHOULDER SURGERY     • TENNIS ELBOW RELEASE         Visit Dx:     ICD-10-CM ICD-9-CM   1. Chronic midline low back pain without sciatica M54.5 724.2    G89.29 338.29   2. Generalized weakness R53.1 780.79         Patient History     Row Name 20 1400             History    Chief Complaint  Pain;Muscle weakness;Difficulty Walking  -WJ      Type of Pain  Back pain;Lower Extremity / Leg  -WJ      Date Current Problem(s) Began  -- Chronic   -WJ      Brief Description of Current Complaint  Pt reports that he had a L3/4 and L4/5 hemilaminectomy on 19. Since surgery he reports pain in the low back and weakness in his legs and back.. He reports that  he has an appointment with pain management on the 20th of this month. He has difficulty with walking and bending forward. He takes Aleve to assist in modulating his pain.  -WJ      Previous treatment for THIS PROBLEM  Chiropractor;Injections;Surgery  -WJ      Surgery Date:  12/31/19  -WJ      Hand Dominance  right-handed  -WJ      Occupation/sports/leisure activities  retired  -WJ      Related/Recent Hospitalizations  Yes  -WJ      Date of Hospitalization  12/31/19  -WJ      Surgery/Hospitalization  Hemilaminecotmy L3/4 L4/5  -WJ         Pain     Pain Location  Back  -WJ      Pain at Present  4  -WJ      Pain at Best  4  -WJ      Pain at Worst  9  -WJ      Pain Frequency  Constant/continuous  -WJ      Pain Description  Aching;Dull;Sharp  -WJ      What Performance Factors Make the Current Problem(s) WORSE?  bending over   -WJ      What Performance Factors Make the Current Problem(s) BETTER?  Aleve  -WJ         Fall Risk Assessment    Any falls in the past year:  Yes  -WJ      Number of falls reported in the last 12 months  -- frequent falls prior to surgery   -WJ         Daily Activities    Pt Participated in POC and Goals  Yes  -WJ        User Key  (r) = Recorded By, (t) = Taken By, (c) = Cosigned By    Initials Name Provider Type    WJ Aamir Witt, PT DPT Physical Therapist          PT Ortho     Row Name 03/04/20 1400       Posture/Observations    Alignment Options  Forward head;Thoracic kyphosis;Rounded shoulders  -WJ    Forward Head  Moderate  -WJ    Thoracic Kyphosis  Moderate  -WJ    Rounded Shoulders  Moderate  -WJ    Posture/Observations Comments  Pt stands with sligth L hip lateral shift   -WJ       Quarter Clearing    Quarter Clearing  Lower Quarter Clearing  -WJ       DTR- Lower Quarter Clearing    Patellar tendon (L2-4)  Right:;1- Minimal response;Left:;0- No response  -WJ    Achilles tendon (S1-2)  Right:;1- Minimal response;Left:;0- No response  -WJ       Neural Tension Signs- Lower Quarter Clearing  "   Slump  Negative  -WJ    SLR  Right:;Positive  -WJ       Sensory Screen for Light Touch- Lower Quarter Clearing    L1 (inguinal area)  Bilateral:;Intact  -WJ    L2 (anterior mid thigh)  Bilateral:;Intact  -WJ    L3 (distal anterior thigh)  Bilateral:;Intact  -WJ    L4 (medial lower leg/foot)  Bilateral:;Intact  -WJ    L5 (lateral lower leg/great toe)  Bilateral:;Intact  -WJ    S1 (bottom of foot)  Bilateral:;Intact  -WJ       Myotomal Screen- Lower Quarter Clearing    Hip flexion (L2)  Right:;3+ (Fair +);Left:;3 (Fair)  -WJ    Knee extension (L3)  Right:;5 (Normal);Left:;4 (Good)  -WJ    Ankle DF (L4)  Right:;5 (Normal);Left:;4- (Good -)  -WJ    Great toe extension (L5)  Right:;4+ (Good +);Left:;4- (Good -)  -WJ    Ankle PF (S1)  Bilateral:;5 (Normal)  -WJ    Knee flexion (S2)  Bilateral:;3+ (Fair +)  -WJ       Lumbar ROM Screen- Lower Quarter Clearing    Lumbar Flexion  Impaired 50%  -WJ    Lumbar Extension  Normal  -WJ    Lumbar Lateral Flexion  -- \"pull\" in both directions  -WJ    Lumbar Rotation  Normal  -WJ       Special Tests/Palpation    Special Tests/Palpation  Lumbar/SI  -WJ       Lumbosacral Palpation    Lumbosacral Palpation?  -- No tendenress noted, erector spinae musculature taut   -WJ       General ROM    GENERAL ROM COMMENTS  Pain with R hip flexion at end range; bilateral IR limited  -WJ       MMT (Manual Muscle Testing)    Rt Lower Ext  Rt Hip ABduction  -WJ    Lt Lower Ext  Lt Hip ABduction  -WJ       MMT Right Lower Ext    Rt Hip ABduction MMT, Gross Movement  (2+/5) poor plus  -WJ       MMT Left Lower Ext    Lt Hip ABduction MMT, Gross Movement  (3+/5) fair plus  -WJ       Sensation    Sensation WNL?  WNL  -WJ       Pathomechanics    Pathomechanics Comments  bends knees with lumbar flexion   -WJ       Gait/Stairs Assessment/Training    Comment (Gait/Stairs)  Pt demonstrates increased pelvic sway and ocasional path deviations, mild LOB with turns that he is able to correct.  -WJ      User Key  " (r) = Recorded By, (t) = Taken By, (c) = Cosigned By    Initials Name Provider Type    Aamir Zarate, PT DPT Physical Therapist                      Therapy Education  Education Details: Edcuated pt on anatomy, posture, body mechaincs and HEP that included HL BKFO, abdominal hollowing and seated marching.  Given: HEP, Symptoms/condition management, Posture/body mechanics, Pain management, Mobility training  Program: New  How Provided: Verbal, Demonstration, Written  Provided to: Patient  Level of Understanding: Teach back education performed, Verbalized, Demonstrated     PT OP Goals     Row Name 03/04/20 1500          Long Term Goals    LTG Date to Achieve  04/01/20  -WJ     LTG 1  Pt to demonstrate independence with comprehensive HEP.  -WJ     LTG 1 Progress  New  -WJ     LTG 2  Pt to demonstrate B hip flexion of 4/5.  -WJ     LTG 2 Progress  New  -WJ     LTG 3  Pt to demonstrate B hip abduction of 4/5.  -WJ     LTG 3 Progress  New  -WJ     LTG 4  Pt to report pain of <2/10 with forward flexion activities.  -WJ     LTG 4 Progress  New  -WJ     LTG 5  PT to demonstrate improved posture and ability to enage musculature to self corret.  -WJ     LTG 5 Progress  New  -WJ     LTG 6  Pt can navigate 3-4 steps without using his UEs to assist.  -WJ     LTG 6 Progress  New  -WJ        Time Calculation    PT Goal Re-Cert Due Date  04/03/20  -WJ       User Key  (r) = Recorded By, (t) = Taken By, (c) = Cosigned By    Initials Name Provider Type    Aamir Zarate PT DPT Physical Therapist          PT Assessment/Plan     Row Name 03/04/20 1500          PT Assessment    Functional Limitations  Impaired gait;Impaired locomotion;Limitations in community activities;Limitations in functional capacity and performance;Performance in leisure activities  -W     Impairments  Pain;Muscle strength;Posture;Poor body mechanics;Range of motion;Locomotion;Impaired muscle endurance;Endurance  -W     Assessment Comments  Mr. Conde  presents to the clinic with a chief complaint of LE and back weakness. He reports some low back pain present with the most pain during benidng activites. He had a hemilaminecotmy at L3-4 and L4-5 on 12/31/19, since that time he reports decreased pain, however weakness persists. He demonstrates mild lumbar guarding and tautness alogn with forward flexed posture and a slight left pelvic shift in standing. There is lower extrmeity weakness present with greater weakness in the L than R specifically in the hips. During ambulation he dmeonstrates mild pelvic drift and occasional path deviations with LOB during turns. Skilled physcial therapy is inidicated to address these deficits, improve his strength and overall mobility. Thank you for this referral.  -WJ     Please refer to paper survey for additional self-reported information  Yes  -WJ     Rehab Potential  Good  -WJ     Patient/caregiver participated in establishment of treatment plan and goals  Yes  -WJ     Patient would benefit from skilled therapy intervention  Yes  -WJ        PT Plan    PT Frequency  2x/week  -WJ     Predicted Duration of Therapy Intervention (Therapy Eval)  4-6 weeks  -WJ     Planned CPT's?  PT EVAL LOW COMPLEXITY: 42775;PT RE-EVAL: 53872;PT THER PROC EA 15 MIN: 51932;PT THER ACT EA 15 MIN: 85528;PT MANUAL THERAPY EA 15 MIN: 52476;PT NEUROMUSC RE-EDUCATION EA 15 MIN: 15540;PT GAIT TRAINING EA 15 MIN: 79817;PT SELF CARE/HOME MGMT/TRAIN EA 15: 39252  -WJ     PT Plan Comments  We will initally work on any soft tissue retictiosn and mobility. We will progresss fairly quickly with hip and core strengthening developing an HEP and workign towards indpendence upon discharge.  -WJ       User Key  (r) = Recorded By, (t) = Taken By, (c) = Cosigned By    Initials Name Provider Type    WJ Aamir Witt, PT DPT Physical Therapist                              Outcome Measure Options: Maria Esther Medina  Modified Oswestry  Modified Oswestry Score/Comments: 5; 10  %      Time Calculation:               PT G-Codes  Outcome Measure Options: Modifed Owestry  Modified Oswestry Score/Comments: 5; 10 %         Aamir Witt, PT DPT  3/4/2020

## 2020-03-10 ENCOUNTER — TREATMENT (OUTPATIENT)
Dept: PHYSICAL THERAPY | Facility: CLINIC | Age: 85
End: 2020-03-10

## 2020-03-10 ENCOUNTER — TELEPHONE (OUTPATIENT)
Dept: CARDIOLOGY | Facility: CLINIC | Age: 85
End: 2020-03-10

## 2020-03-10 DIAGNOSIS — R53.1 GENERALIZED WEAKNESS: ICD-10-CM

## 2020-03-10 DIAGNOSIS — M54.50 CHRONIC MIDLINE LOW BACK PAIN WITHOUT SCIATICA: Primary | ICD-10-CM

## 2020-03-10 DIAGNOSIS — G89.29 CHRONIC MIDLINE LOW BACK PAIN WITHOUT SCIATICA: Primary | ICD-10-CM

## 2020-03-10 PROCEDURE — 97110 THERAPEUTIC EXERCISES: CPT | Performed by: PHYSICAL THERAPIST

## 2020-03-10 PROCEDURE — 97140 MANUAL THERAPY 1/> REGIONS: CPT | Performed by: PHYSICAL THERAPIST

## 2020-03-10 NOTE — TELEPHONE ENCOUNTER
----- Message from VIVIEN Nixon sent at 3/5/2020 12:11 PM CST -----  Please let this pt know that his pumping function was normal on his echo. He has some mild stiffening of the left ventricle that is normal for his age. He has a mildly leaky aortic valve that will need to be monitored with routine echo but no indication for any further treatment at this time. He needs to keep his scheduled follow up to review his home blood pressures. If he continues to have leg swelling, despite good blood pressure control, we may need to refer him to vascular for venous insufficiency.

## 2020-03-10 NOTE — PROGRESS NOTES
Outpatient Physical Therapy Ortho Treatment Note       Patient Name: Mickey Conde  : 1935  MRN: [id number]  Today's Date: 3/10/2020      Visit Date: 03/10/2020    Visit Dx:    ICD-10-CM ICD-9-CM   1. Chronic midline low back pain without sciatica M54.5 724.2    G89.29 338.29   2. Generalized weakness R53.1 780.79       Patient Active Problem List   Diagnosis   • CAD (coronary artery disease)   • HTN (hypertension)   • Hyperlipidemia   • Myocardial infarct (CMS/HCC)   • Chest pain   • History of coronary artery stent placement   • Degeneration of lumbar or lumbosacral intervertebral disc   • Former smoker   • Overweight (BMI 25.0-29.9)   • Spinal stenosis, lumbar region, with neurogenic claudication   • Bilateral lower extremity edema        Past Medical History:   Diagnosis Date   • Benign essential hypertension    • CAD (coronary artery disease)    • CAD in native artery     Story: lexiscan 2012 negative for ischemia   • Enlarged prostate    • Hyperlipemia, mixed     Lipid panel (10/2014) 143/48/81/189   • Hyperlipidemia    • Hypertension    • Myocardial infarction (CMS/HCC)         Past Surgical History:   Procedure Laterality Date   • BACK SURGERY     • CAROTID STENT     • LUMBAR LAMINECTOMY Left 2019    Procedure: LEFT LUMBAR HEMILAMINECTOMY WITHOUT FUSION AT LUMBAR 3/4 AND 4/5;  Surgeon: Stalin Williamson MD;  Location: [facility];  Service: Neurosurgery   • ROTATOR CUFF REPAIR Bilateral    • SHOULDER SURGERY     • TENNIS ELBOW RELEASE                         PT Assessment/Plan     Row Name 03/10/20 1107          PT Assessment    Assessment Comments  Today was Mr. Conde first visit since his initial evaluation, therefore, no goals have been met at this time. We focused primarily on reducing soft tissue restrictions. He was slightly guarded on the R side of his sacral region. His thoracic spine was VERY stiff. We worked on light hip, core, and postural strengthening. He did struggle w/  BKFO on the non-moving leg, being able to maintain position while the other pulled. His pain was reduced post-session and he reports compliance w/ HEP.   -        PT Plan    PT Plan Comments  Continue to work on increasing his thoracic mobility and hip, core, and postural strength.   -       User Key  (r) = Recorded By, (t) = Taken By, (c) = Cosigned By    Initials Name Provider Type    Norah Moreno PTA Physical Therapy Assistant            OP Exercises     Row Name 03/10/20 1107             Subjective Comments    Subjective Comments  Pt. is going to pain management on the 20th. He went from taking 11 Alieves a day to just 2 in the morning. His pain is worse when he gets up. The more he walks, the better it gets. Pt. has much difficulty ascending the step into his house from the garage.   -         Subjective Pain    Able to rate subjective pain?  yes  -      Pre-Treatment Pain Level  -- 5-6  -AH      Post-Treatment Pain Level  0  -AH         Total Minutes    58616 - PT Therapeutic Exercise Minutes  20  -AH      36122 - PT Manual Therapy Minutes  30  -AH         Exercise 1    Exercise Name 1  B heel slides on ball  -AH      Cueing 1  Verbal;Tactile  -AH      Sets 1  2  -AH      Reps 1  10  -AH         Exercise 2    Exercise Name 2  BKFO against red TB  -AH      Cueing 2  Verbal;Tactile  -AH      Sets 2  2  -AH      Reps 2  10  -AH         Exercise 3    Exercise Name 3  bridges  -AH      Cueing 3  Verbal  -AH      Sets 3  2  -AH      Reps 3  10  -AH         Exercise 4    Exercise Name 4  HL ball presses  -AH      Cueing 4  Verbal;Tactile  -AH      Sets 4  2  -AH      Reps 4  10  -AH         Exercise 5    Exercise Name 5  horizontal abd  -AH      Cueing 5  Verbal;Demo  -AH      Sets 5  2  -AH      Reps 5  10  -AH      Additional Comments  red TB  -AH        User Key  (r) = Recorded By, (t) = Taken By, (c) = Cosigned By    Initials Name Provider Type    Norah Moreno PTA Physical Therapy Assistant                       Manual Rx (last 36 hours)      Manual Treatments     Row Name 03/10/20 1107             Total Minutes    96960 - PT Manual Therapy Minutes  30  -         Manual Rx 1    Manual Rx 1 Location  thoracolumbar & gluteals  -      Manual Rx 1 Type  IASTM w/ blue ridged roller, prone  -         Manual Rx 2    Manual Rx 2 Location  thoracic  -      Manual Rx 2 Type  extension mobilizations, prone  -         Manual Rx 3    Manual Rx 3 Location  B lumbar paraspinals  -      Manual Rx 3 Type  STM w/ massage cream, prone  -        User Key  (r) = Recorded By, (t) = Taken By, (c) = Cosigned By    Initials Name Provider Type     Norah Hilton, CLARA Physical Therapy Assistant          PT OP Goals     Row Name 03/10/20 1107          Long Term Goals    LTG Date to Achieve  04/01/20  -     LTG 1  Pt to demonstrate independence with comprehensive HEP.  -     LTG 1 Progress  New  [facility]     LTG 2  Pt to demonstrate B hip flexion of 4/5.  -     LTG 2 Progress  New  [facility]     LTG 3  Pt to demonstrate B hip abduction of 4/5.  -     LTG 3 Progress  Ongoing  -     LTG 3 Progress Comments  [facility] hip strengthening   [facility]  Pt to report pain of <2/10 with forward flexion activities.  -     LTG 4 Progress  New  [facility]     LTG 5  PT to demonstrate improved posture and ability to enage musculature to self corret.  -     LTG 5 Progress  Ongoing  -     LTG 5 Progress Comments  Pt. presented to the clinic w/ very forward flexed posture in sitting.   -[facility] 6  Pt can navigate 3-4 steps without using his UEs to assist.  -[facility] 6 Progress  New  -AH        Time Calculation    PT Goal Re-Cert Due Date  04/03/20  -       User Key  (r) = Recorded By, (t) = Taken By, (c) = Cosigned By    Initials Name Provider Type     Norah Hilton PTA Physical Therapy Assistant          Therapy Education  Given: Posture/body mechanics, HEP, Symptoms/condition management, Pain management  Program:  Reinforced, New  How Provided: Verbal, Demonstration  Provided to: Patient  Level of Understanding: Verbalized              Time Calculation:   Total Timed Code Minutes- PT: 50 minute(s)                Norah Hilton, PTA  3/10/2020

## 2020-03-12 ENCOUNTER — TREATMENT (OUTPATIENT)
Dept: PHYSICAL THERAPY | Facility: CLINIC | Age: 85
End: 2020-03-12

## 2020-03-12 DIAGNOSIS — R53.1 GENERALIZED WEAKNESS: ICD-10-CM

## 2020-03-12 DIAGNOSIS — G89.29 CHRONIC MIDLINE LOW BACK PAIN WITHOUT SCIATICA: Primary | ICD-10-CM

## 2020-03-12 DIAGNOSIS — M54.50 CHRONIC MIDLINE LOW BACK PAIN WITHOUT SCIATICA: Primary | ICD-10-CM

## 2020-03-12 PROCEDURE — 97140 MANUAL THERAPY 1/> REGIONS: CPT | Performed by: PHYSICAL THERAPIST

## 2020-03-12 PROCEDURE — 97110 THERAPEUTIC EXERCISES: CPT | Performed by: PHYSICAL THERAPIST

## 2020-03-12 NOTE — PROGRESS NOTES
Outpatient Physical Therapy Ortho Treatment Note       Patient Name: Mickey Conde  : 1935  MRN: [id number]  Today's Date: 3/12/2020      Visit Date: 2020    Visit Dx:    ICD-10-CM ICD-9-CM   1. Chronic midline low back pain without sciatica M54.5 724.2    G89.29 338.29   2. Generalized weakness R53.1 780.79       Patient Active Problem List   Diagnosis   • CAD (coronary artery disease)   • HTN (hypertension)   • Hyperlipidemia   • Myocardial infarct (CMS/HCC)   • Chest pain   • History of coronary artery stent placement   • Degeneration of lumbar or lumbosacral intervertebral disc   • Former smoker   • Overweight (BMI 25.0-29.9)   • Spinal stenosis, lumbar region, with neurogenic claudication   • Bilateral lower extremity edema        Past Medical History:   Diagnosis Date   • Benign essential hypertension    • CAD (coronary artery disease)    • CAD in native artery     Story: lexiscan 2012 negative for ischemia   • Enlarged prostate    • Hyperlipemia, mixed     Lipid panel (10/2014) 143/48/81/189   • Hyperlipidemia    • Hypertension    • Myocardial infarction (CMS/HCC)         Past Surgical History:   Procedure Laterality Date   • BACK SURGERY     • CAROTID STENT     • LUMBAR LAMINECTOMY Left 2019    Procedure: LEFT LUMBAR HEMILAMINECTOMY WITHOUT FUSION AT LUMBAR 3/4 AND 4/5;  Surgeon: Stalin Williamson MD;  Location: [facility];  Service: Neurosurgery   • ROTATOR CUFF REPAIR Bilateral    • SHOULDER SURGERY     • TENNIS ELBOW RELEASE                         PT Assessment/Plan     Row Name 20 1100          PT Assessment    Assessment Comments  Mr. Conde presented to the clinic today with reports of pain in the morning then after 5-6 steps his pain decreases and feels more mobile. He continues to dmoenstrate thoracic hypomobility. He demonstrates decreased coordination and muscle control on the R side duirng hip strengthening activities. He also requires verbal and tactile cues to  avoid compensatory stratgeies with hip extension and abduction.  -WJ        PT Plan    PT Plan Comments  Continue to work on his mobility along with hip/core strength, endurance and coordination.  -WJ       User Key  (r) = Recorded By, (t) = Taken By, (c) = Cosigned By    Initials Name Provider Type    WAamir Denny, PT DPT Physical Therapist            OP Exercises     Row Name 03/12/20 1000 03/12/20 0900          Subjective Comments    Subjective Comments  Pt reports that he is doing pretty well, he continues to have the most pain when he gets up 9/10 but after the first 5-6 sterps it decreases and he can move better.  -WJ  --        Subjective Pain    Able to rate subjective pain?  yes  -WJ  --     Pre-Treatment Pain Level  5  -WJ  --        Total Minutes    62382 - PT Therapeutic Exercise Minutes  --  30  -WJ     42250 - PT Manual Therapy Minutes  --  18  -WJ        Exercise 1    Exercise Name 1  Single leg heel slides on ball  -WJ  --     Cueing 1  Verbal;Tactile  -WJ  --     Sets 1  2  -WJ  --     Reps 1  10  -WJ  --     Additional Comments  each leg. Better control with L.  -WJ  --        Exercise 2    Exercise Name 2  BKFO against red TB  -WJ  --     Cueing 2  Verbal;Tactile  -WJ  --     Sets 2  2  -WJ  --     Reps 2  10  -WJ  --        Exercise 3    Exercise Name 3  HL core stab. ball presses  -WJ  --     Cueing 3  Verbal;Tactile  -WJ  --     Sets 3  2  -WJ  --     Reps 3  10  -WJ  --        Exercise 4    Exercise Name 4  deadbugs HL with 65 cm SB  -WJ  --     Cueing 4  Verbal;Demo  -WJ  --     Sets 4  2  -WJ  --     Reps 4  10  -WJ  --        Exercise 5    Exercise Name 5  sidelying hip extension with ball  -WJ  --     Cueing 5  Verbal;Tactile  -WJ  --     Sets 5  2  -WJ  --     Reps 5  10  -WJ  --     Additional Comments  more range on L.  -WJ  --        Exercise 6    Exercise Name 6  shuttle press  -WJ  --     Cueing 6  Verbal;Demo  -WJ  --     Additional Comments  5 bands  -WJ  --       User Key   (r) = Recorded By, (t) = Taken By, (c) = Cosigned By    Initials Name Provider Type    Aamir Zarate PT DPT Physical Therapist                      Manual Rx (last 36 hours)      Manual Treatments     Row Name 03/12/20 0900             Total Minutes    51317 - PT Manual Therapy Minutes  18  -WJ         Manual Rx 1    Manual Rx 1 Location  thoracolumbar and glutes  -WJ      Manual Rx 1 Type  IASTM with blue ridged foam roller   -WJ      Manual Rx 1 Grade  mod  -WJ         Manual Rx 2    Manual Rx 2 Location  thoracic  -WJ      Manual Rx 2 Type  extension mobilizations, prone  -WJ      Manual Rx 2 Grade  2  -WJ        User Key  (r) = Recorded By, (t) = Taken By, (c) = Cosigned By    Initials Name Provider Type    Aamir Zarate PT DPT Physical Therapist          PT OP Goals     Row Name 03/12/20 1000          Long Term Goals    LTG Date to Achieve  04/01/20  -WJ     LTG 1  Pt to demonstrate independence with comprehensive HEP.  -WJ     LTG 1 Progress  Ongoing  -WJ     LTG 2  Pt to demonstrate B hip flexion of 4/5.  -WJ     LTG 2 Progress  Ongoing  -WJ     LTG 3  Pt to demonstrate B hip abduction of 4/5.  -WJ     LTG 3 Progress  Ongoing  -WJ     LTG 4  Pt to report pain of <2/10 with forward flexion activities.  -WJ     LTG 4 Progress  Ongoing  -WJ     LTG 4 Progress Comments  5/10 today   -WJ     LTG 5  PT to demonstrate improved posture and ability to enage musculature to self corret.  -WJ     LTG 5 Progress  Ongoing  -WJ     LTG 6  Pt can navigate 3-4 steps without using his UEs to assist.  -WJ     LTG 6 Progress  Ongoing  -WJ        Time Calculation    PT Goal Re-Cert Due Date  04/03/20  -WJ       User Key  (r) = Recorded By, (t) = Taken By, (c) = Cosigned By    Initials Name Provider Type    Aamir Zarate, PT DPT Physical Therapist          Therapy Education  Given: Posture/body mechanics, HEP, Symptoms/condition management, Pain management  Program: Reinforced  How Provided: Verbal,  Demonstration  Provided to: Patient  Level of Understanding: Verbalized              Time Calculation:   Total Timed Code Minutes- PT: 48 minute(s)                Aamir Witt, PT DPT  3/12/2020

## 2020-03-17 ENCOUNTER — TREATMENT (OUTPATIENT)
Dept: PHYSICAL THERAPY | Facility: CLINIC | Age: 85
End: 2020-03-17

## 2020-03-17 DIAGNOSIS — G89.29 CHRONIC MIDLINE LOW BACK PAIN WITHOUT SCIATICA: Primary | ICD-10-CM

## 2020-03-17 DIAGNOSIS — M54.50 CHRONIC MIDLINE LOW BACK PAIN WITHOUT SCIATICA: Primary | ICD-10-CM

## 2020-03-17 DIAGNOSIS — R53.1 GENERALIZED WEAKNESS: ICD-10-CM

## 2020-03-17 PROCEDURE — 97110 THERAPEUTIC EXERCISES: CPT | Performed by: PHYSICAL THERAPIST

## 2020-03-17 NOTE — PROGRESS NOTES
Outpatient Physical Therapy Ortho Treatment Note       Patient Name: Mickey Conde  : 1935  MRN: [id number]  Today's Date: 3/17/2020      Visit Date: 2020    Visit Dx:    ICD-10-CM ICD-9-CM   1. Chronic midline low back pain without sciatica M54.5 724.2    G89.29 338.29   2. Generalized weakness R53.1 780.79       Patient Active Problem List   Diagnosis   • CAD (coronary artery disease)   • HTN (hypertension)   • Hyperlipidemia   • Myocardial infarct (CMS/HCC)   • Chest pain   • History of coronary artery stent placement   • Degeneration of lumbar or lumbosacral intervertebral disc   • Former smoker   • Overweight (BMI 25.0-29.9)   • Spinal stenosis, lumbar region, with neurogenic claudication   • Bilateral lower extremity edema        Past Medical History:   Diagnosis Date   • Benign essential hypertension    • CAD (coronary artery disease)    • CAD in native artery     Story: lexiscan 2012 negative for ischemia   • Enlarged prostate    • Hyperlipemia, mixed     Lipid panel (10/2014) 143/48/81/189   • Hyperlipidemia    • Hypertension    • Myocardial infarction (CMS/HCC)         Past Surgical History:   Procedure Laterality Date   • BACK SURGERY     • CAROTID STENT     • LUMBAR LAMINECTOMY Left 2019    Procedure: LEFT LUMBAR HEMILAMINECTOMY WITHOUT FUSION AT LUMBAR 3/4 AND 4/5;  Surgeon: Stalin Williamson MD;  Location: [facility];  Service: Neurosurgery   • ROTATOR CUFF REPAIR Bilateral    • SHOULDER SURGERY     • TENNIS ELBOW RELEASE                         PT Assessment/Plan     Row Name 20 1200          PT Assessment    Assessment Comments  I had him perform more standing therex today and he did not exhibit or report any adverse reactions.  -EC        PT Plan    PT Plan Comments  Continue to focus on patient education and work on B LE strengthening.  -EC       User Key  (r) = Recorded By, (t) = Taken By, (c) = Cosigned By    Initials Name Provider Type    EC Miguel Ángel Cunningham,  PTA Physical Therapy Assistant            OP Exercises     Row Name 03/17/20 1100             Subjective Comments    Subjective Comments  He denies having arthritis, reports LBP gets better the more he moves  -EC         Subjective Pain    Able to rate subjective pain?  yes  -EC      Pre-Treatment Pain Level  4  -EC         Total Minutes    96298 - PT Therapeutic Exercise Minutes  45  -EC         Exercise 1    Exercise Name 1  R hip ER, Lhip ER/IR stretches with intermittent inferior lateral glides   -EC         Exercise 2    Exercise Name 2  Unicam seat#5 res 2  -EC      Time 2  6 min  -EC         Exercise 3    Exercise Name 3  resisted B side steps with red Can Do  -EC      Reps 3  20  -EC         Exercise 4    Exercise Name 4  resisted B unilateral hip extension with red T band in standing  -EC      Reps 4  20  -EC         Exercise 5    Exercise Name 5  seated manual B pec and thoracic stretches with 1/2 foam roller in sitting  -EC         Exercise 6    Exercise Name 6  seated UE phasic  -EC      Reps 6  20  -EC         Exercise 7    Exercise Name 7  B LE Shuttle Press with 5 bands  -EC      Reps 7  20  -EC        User Key  (r) = Recorded By, (t) = Taken By, (c) = Cosigned By    Initials Name Provider Type    EC Miguel Ángel Cunningham, CLARA Physical Therapy Assistant                       PT OP Goals     Row Name 03/17/20 1200          Long Term Goals    LTG Date to Achieve  04/03/20  -EC     LTG 1  Pt to demonstrate independence with comprehensive HEP.  -EC     LTG 1 Progress  Ongoing  -EC     LTG 2  Pt to demonstrate B hip flexion of 4/5.  -EC     LTG 2 Progress  Ongoing  -EC     LTG 3  Pt to demonstrate B hip abduction of 4/5.  -EC     LTG 3 Progress  Ongoing  -EC     LTG 4  Pt to report pain of <2/10 with forward flexion activities.  -EC     LTG 4 Progress  Ongoing  -EC     LTG 4 Progress Comments  4/10 today  -EC     LTG 5  PT to demonstrate improved posture and ability to enage musculature to self corret.  -EC      LTG 5 Progress  Ongoing  -EC     LTG 6  Pt can navigate 3-4 steps without using his UEs to assist.  -EC     LTG 6 Progress  Ongoing  -EC        Time Calculation    PT Goal Re-Cert Due Date  04/03/20  -EC       User Key  (r) = Recorded By, (t) = Taken By, (c) = Cosigned By    Initials Name Provider Type    EC Miguel Ángel Cunningham PTA Physical Therapy Assistant                         Time Calculation:                    Miguel Ángel Cunningham PTA  3/17/2020

## 2020-03-19 ENCOUNTER — TREATMENT (OUTPATIENT)
Dept: PHYSICAL THERAPY | Facility: CLINIC | Age: 85
End: 2020-03-19

## 2020-03-19 DIAGNOSIS — M54.50 CHRONIC MIDLINE LOW BACK PAIN WITHOUT SCIATICA: Primary | ICD-10-CM

## 2020-03-19 DIAGNOSIS — R53.1 GENERALIZED WEAKNESS: ICD-10-CM

## 2020-03-19 DIAGNOSIS — G89.29 CHRONIC MIDLINE LOW BACK PAIN WITHOUT SCIATICA: Primary | ICD-10-CM

## 2020-03-19 PROCEDURE — 97140 MANUAL THERAPY 1/> REGIONS: CPT | Performed by: PHYSICAL THERAPIST

## 2020-03-19 PROCEDURE — 97110 THERAPEUTIC EXERCISES: CPT | Performed by: PHYSICAL THERAPIST

## 2020-03-19 NOTE — PROGRESS NOTES
Outpatient Physical Therapy Ortho Treatment Note       Patient Name: Mickey Conde  : 1935  MRN: [id number]  Today's Date: 3/19/2020      Visit Date: 2020    Visit Dx:    ICD-10-CM ICD-9-CM   1. Chronic midline low back pain without sciatica M54.5 724.2    G89.29 338.29   2. Generalized weakness R53.1 780.79       Patient Active Problem List   Diagnosis   • CAD (coronary artery disease)   • HTN (hypertension)   • Hyperlipidemia   • Myocardial infarct (CMS/HCC)   • Chest pain   • History of coronary artery stent placement   • Degeneration of lumbar or lumbosacral intervertebral disc   • Former smoker   • Overweight (BMI 25.0-29.9)   • Spinal stenosis, lumbar region, with neurogenic claudication   • Bilateral lower extremity edema        Past Medical History:   Diagnosis Date   • Benign essential hypertension    • CAD (coronary artery disease)    • CAD in native artery     Story: lexiscan 2012 negative for ischemia   • Enlarged prostate    • Hyperlipemia, mixed     Lipid panel (10/2014) 143/48/81/189   • Hyperlipidemia    • Hypertension    • Myocardial infarction (CMS/HCC)         Past Surgical History:   Procedure Laterality Date   • BACK SURGERY     • CAROTID STENT     • LUMBAR LAMINECTOMY Left 2019    Procedure: LEFT LUMBAR HEMILAMINECTOMY WITHOUT FUSION AT LUMBAR 3/4 AND 4/5;  Surgeon: Stalin Williamson MD;  Location: [facility];  Service: Neurosurgery   • ROTATOR CUFF REPAIR Bilateral    • SHOULDER SURGERY     • TENNIS ELBOW RELEASE                         PT Assessment/Plan     Row Name 20 1000          PT Assessment    Assessment Comments  He still exhibits lack of control with the RLE seen with standing exercises. We progressed resistance with the shuttle press. He has an appointment with pain management tomorrow and has increased pain with sit to stands. Required cueing to maintain upright posture with exercises.  -WJ (r) DN (t) WJ (c)        PT Plan    PT Plan Comments   Continue to address his core/hip strength and postural education. We will also improve his endurance, activity tolerance, and coordination.  -WJ (r) DN (t) WJ (c)       User Key  (r) = Recorded By, (t) = Taken By, (c) = Cosigned By    Initials Name Provider Type    STAR Aamir Witt, PT DPT Physical Therapist    Robert Barriga, PT Student PT Student            OP Exercises     Row Name 03/19/20 0900             Subjective Comments    Subjective Comments  Pt has pain getting up and has a meeting with pain management tomorrow.  -WJ (r) DN (t) WJ (c)         Subjective Pain    Able to rate subjective pain?  yes  -WJ (r) DN (t) WJ (c)      Pre-Treatment Pain Level  4 9 when getting up.  -WJ (r) DN (t) WJ (c)      Post-Treatment Pain Level  4  -WJ (r) DN (t) WJ (c)         Total Minutes    35349 - PT Therapeutic Exercise Minutes  28  -WJ (r) DN (t) WJ (c)      47186 - PT Manual Therapy Minutes  17  -WJ (r) DN (t) WJ (c)         Exercise 1    Exercise Name 1  B hip flexion, IR/ER stretches  -WJ (r) DN (t) WJ (c)      Cueing 1  Tactile  -WJ (r) DN (t) WJ (c)      Sets 1  2  -WJ (r) DN (t) WJ (c)      Time 1  30 sec  -WJ (r) DN (t) WJ (c)      Additional Comments  L IR limited  -WJ (r) DN (t) WJ (c)         Exercise 2    Exercise Name 2  bridges  -WJ (r) DN (t) WJ (c)      Cueing 2  Verbal;Tactile  -WJ (r) DN (t) WJ (c)      Sets 2  2  -WJ (r) DN (t) WJ (c)      Reps 2  10  -WJ (r) DN (t) WJ (c)         Exercise 3    Exercise Name 3  standing hip abduction  -WJ (r) DN (t) WJ (c)      Cueing 3  Verbal;Tactile;Demo  -WJ (r) DN (t) WJ (c)      Sets 3  2  -WJ (r) DN (t) WJ (c)      Reps 3  10  -WJ (r) DN (t) WJ (c)      Additional Comments  each way  -WJ (r) DN (t) WJ (c)         Exercise 4    Exercise Name 4  standing hip extension  -WJ (r) DN (t) WJ (c)      Cueing 4  Verbal;Tactile;Demo  -WJ (r) DN (t) WJ (c)      Sets 4  2  -WJ (r) DN (t) WJ (c)      Reps 4  10  -WJ (r) DN (t) WJ (c)      Additional Comments  had  trouble keeping R knee extended.  -WJ (r) DN (t) WJ (c)         Exercise 5    Exercise Name 5  marching in place with 2-3 second hold at peak  -WJ (r) DN (t) WJ (c)      Cueing 5  Verbal;Demo  -WJ (r) DN (t) WJ (c)      Sets 5  2  -WJ (r) DN (t) WJ (c)      Reps 5  10  -WJ (r) DN (t) WJ (c)         Exercise 6    Exercise Name 6  shuttle press  -WJ (r) DN (t) WJ (c)      Cueing 6  Verbal  -WJ (r) DN (t) WJ (c)      Sets 6  3  -WJ (r) DN (t) WJ (c)      Reps 6  10  -WJ (r) DN (t) WJ (c)      Additional Comments  6 bands  -WJ (r) DN (t) WJ (c)        User Key  (r) = Recorded By, (t) = Taken By, (c) = Cosigned By    Initials Name Provider Type    Aamir Zarate, PT DPT Physical Therapist    Robert Barriga, PT Student PT Student                      Manual Rx (last 36 hours)      Manual Treatments     Row Name 03/19/20 0900             Total Minutes    62783 - PT Manual Therapy Minutes  17  -WJ (r) DN (t) WJ (c)         Manual Rx 1    Manual Rx 1 Location  thoracolumbar and glutes  -WJ (r) DN (t) WJ (c)      Manual Rx 1 Type  IASTM with blue ridged foam roller   -WJ (r) DN (t) WJ (c)      Manual Rx 1 Grade  mod  -WJ (r) DN (t) WJ (c)         Manual Rx 2    Manual Rx 2 Location  thoracic  -WJ (r) DN (t) WJ (c)      Manual Rx 2 Type  extension mobilizations, prone  -WJ (r) DN (t) WJ (c)      Manual Rx 2 Grade  2  -WJ (r) DN (t) WJ (c)        User Key  (r) = Recorded By, (t) = Taken By, (c) = Cosigned By    Initials Name Provider Type    Aamir Zarate, PT DPT Physical Therapist    Robert Barriga, PT Student PT Student          PT OP Goals     Row Name 03/19/20 1000          Long Term Goals    LTG Date to Achieve  04/03/20  -WJ (r) DN (t) WJ (c)     LTG 1  Pt to demonstrate independence with comprehensive HEP.  -WJ (r) DN (t) WJ (c)     LTG 1 Progress  Ongoing  -WJ (r) DN (t) WJ (c)     LTG 2  Pt to demonstrate B hip flexion of 4/5.  -WJ (r) DN (t) WJ (c)     LTG 2 Progress  Ongoing  -WJ (r) DN (t) WJ (c)      LTG 3  Pt to demonstrate B hip abduction of 4/5.  -WJ (r) DN (t) WJ (c)     LTG 3 Progress  Ongoing  -WJ (r) DN (t) WJ (c)     LTG 3 Progress Comments  Weakness shown in standing hip abduction exercises.  -WJ (r) DN (t) WJ (c)     LTG 4  Pt to report pain of <2/10 with forward flexion activities.  -WJ (r) DN (t) WJ (c)     LTG 4 Progress  Ongoing  -WJ (r) DN (t) WJ (c)     LTG 5  PT to demonstrate improved posture and ability to enage musculature to self corret.  -WJ (r) DN (t) WJ (c)     LTG 5 Progress  Ongoing  -WJ (r) DN (t) WJ (c)     LTG 6  Pt can navigate 3-4 steps without using his UEs to assist.  -WJ (r) DN (t) WJ (c)     LTG 6 Progress  Ongoing  -WJ (r) DN (t) WJ (c)        Time Calculation    PT Goal Re-Cert Due Date  04/03/20  -WJ (r) DN (t) WJ (c)       User Key  (r) = Recorded By, (t) = Taken By, (c) = Cosigned By    Initials Name Provider Type    Aamir Zarate, PT DPT Physical Therapist    Robert Barriga, PT Student PT Student          Therapy Education  Given: Posture/body mechanics, HEP, Symptoms/condition management, Pain management  Program: Reinforced  How Provided: Verbal, Demonstration  Provided to: Patient  Level of Understanding: Verbalized              Time Calculation:   Total Timed Code Minutes- PT: 45 minute(s)                Aamir Witt, PT DPT  3/19/2020

## 2020-03-23 ENCOUNTER — TELEPHONE (OUTPATIENT)
Dept: PHYSICAL THERAPY | Facility: CLINIC | Age: 85
End: 2020-03-23

## 2020-03-23 ENCOUNTER — OFFICE VISIT (OUTPATIENT)
Dept: CARDIOLOGY | Facility: CLINIC | Age: 85
End: 2020-03-23

## 2020-03-23 VITALS
HEART RATE: 62 BPM | SYSTOLIC BLOOD PRESSURE: 132 MMHG | OXYGEN SATURATION: 98 % | WEIGHT: 171 LBS | HEIGHT: 68 IN | DIASTOLIC BLOOD PRESSURE: 66 MMHG | BODY MASS INDEX: 25.91 KG/M2

## 2020-03-23 DIAGNOSIS — R53.1 GENERALIZED WEAKNESS: ICD-10-CM

## 2020-03-23 DIAGNOSIS — E78.2 MIXED HYPERLIPIDEMIA: ICD-10-CM

## 2020-03-23 DIAGNOSIS — I10 ESSENTIAL HYPERTENSION: ICD-10-CM

## 2020-03-23 DIAGNOSIS — I25.10 CORONARY ARTERY DISEASE INVOLVING NATIVE CORONARY ARTERY OF NATIVE HEART WITHOUT ANGINA PECTORIS: Primary | ICD-10-CM

## 2020-03-23 DIAGNOSIS — Z95.5 HISTORY OF CORONARY ARTERY STENT PLACEMENT: ICD-10-CM

## 2020-03-23 DIAGNOSIS — M54.50 CHRONIC MIDLINE LOW BACK PAIN WITHOUT SCIATICA: Primary | ICD-10-CM

## 2020-03-23 DIAGNOSIS — G89.29 CHRONIC MIDLINE LOW BACK PAIN WITHOUT SCIATICA: Primary | ICD-10-CM

## 2020-03-23 DIAGNOSIS — R60.0 BILATERAL LOWER EXTREMITY EDEMA: ICD-10-CM

## 2020-03-23 PROCEDURE — 99214 OFFICE O/P EST MOD 30 MIN: CPT | Performed by: NURSE PRACTITIONER

## 2020-03-23 RX ORDER — AMLODIPINE BESYLATE 10 MG/1
10 TABLET ORAL DAILY
Qty: 90 TABLET | Refills: 3 | Status: SHIPPED | OUTPATIENT
Start: 2020-03-23 | End: 2021-09-04 | Stop reason: HOSPADM

## 2020-03-23 RX ORDER — SIMVASTATIN 40 MG
40 TABLET ORAL
Qty: 90 TABLET | Refills: 3 | Status: SHIPPED | OUTPATIENT
Start: 2020-03-23 | End: 2022-01-01

## 2020-03-23 RX ORDER — LISINOPRIL 30 MG/1
30 TABLET ORAL DAILY
Qty: 90 TABLET | Refills: 3 | Status: SHIPPED | OUTPATIENT
Start: 2020-03-23

## 2020-03-23 NOTE — TELEPHONE ENCOUNTER
Advised him of cancellation of scheduled appointments due to Corona virus precautions. Offered to reschedule more visits on or after 4/6/20 and he declined stating he wanted to wait and see what happens. He will call us to reschedule, he had no questions about HEP at this time and was advised that he would need to sign up for My Chart to utilize tele-health in the future.

## 2020-03-23 NOTE — PATIENT INSTRUCTIONS
Advance Care Planning and Advance Directives     You make decisions on a daily basis - decisions about where you want to live, your career, your home, your life. Perhaps one of the most important decisions you face is your choice for future medical care. Take time to talk with your family and your healthcare team and start planning today.  Advance Care Planning is a process that can help you:  · Understand possible future healthcare decisions in light of your own experiences  · Reflect on those decision in light of your goals and values  · Discuss your decisions with those closest to you and the healthcare professionals that care for you  · Make a plan by creating a document that reflects your wishes    Surrogate Decision Maker  In the event of a medical emergency, which has left you unable to communicate or to make your own decisions, you would need someone to make decisions for you.  It is important to discuss your preferences for medical treatment with this person while you are in good health.     Qualities of a surrogate decision maker:  • Willing to take on this role and responsibility  • Knows what you want for future medical care  • Willing to follow your wishes even if they don't agree with them  • Able to make difficult medical decisions under stressful circumstances    Advance Directives  These are legal documents you can create that will guide your healthcare team and decision maker(s) when needed. These documents can be stored in the electronic medical record.    · Living Will - a legal document to guide your care if you have a terminal condition or a serious illness and are unable to communicate. States vary by statute in document names/types, but most forms may include one or more of the following:        -  Directions regarding life-prolonging treatments        -  Directions regarding artificially provided nutrition/hydration        -  Choosing a healthcare decision maker        -  Direction  regarding organ/tissue donation    · Durable Power of  for Healthcare - this document names an -in-fact to make medical decisions for you, but it may also allow this person to make personal and financial decisions for you. Please seek the advice of an  if you need this type of document.    **Advance Directives are not required and no one may discriminate against you if you do not sign one.    Medical Orders  Many states allow specific forms/orders signed by your physician to record your wishes for medical treatment in your current state of health. This form, signed in personal communication with your physician, addresses resuscitation and other medical interventions that you may or may not want.      For more information or to schedule a time with a [facility] Advance Care Planning Facilitator contact: [email or web address] or call [phone] and someone will contact you directly.

## 2020-03-23 NOTE — PROGRESS NOTES
Subjective:     Encounter Date:03/23/2020      Patient ID: Mickey Conde is a 84 y.o. male. He presents today for routine follow up of outpatient echo on 3/4/20, which revealed normal left ventricular systolic function with ejection fraction of 65%, grade 1 diastolic dysfunction, borderline dilation of the left atrial cavity and mild aortic valve stenosis. He has a history of coronary artery disease with remote stenting, hypertension and hyperlipidemia.  He continues to report bilateral lower extremity edema.  He denies chest pain, shortness of breath, palpitations, dizziness, syncope, orthopnea, PND or decreased stamina. He denies any signs of bleeding. He reports that cholesterol is managed by PCP and is reported to be well controlled on statin. He reports routinely monitoring blood pressure at home with readings averaging in the 140s-170s/70s-80s. He states that overall he has been well since his last visit.     Chief Complaint: routine follow up  Coronary Artery Disease   Presents for follow-up visit. Symptoms include leg swelling. Pertinent negatives include no chest pain, chest pressure, chest tightness, dizziness, muscle weakness, palpitations, shortness of breath or weight gain. Risk factors include hyperlipidemia. The symptoms have been stable. Compliance with diet is variable. Compliance with exercise is variable. Compliance with medications is good.   Hypertension   This is a chronic problem. The current episode started more than 1 year ago. The problem is uncontrolled. Pertinent negatives include no anxiety, blurred vision, chest pain, headaches, malaise/fatigue, neck pain, orthopnea, palpitations, peripheral edema, PND, shortness of breath or sweats. Risk factors for coronary artery disease include male gender and dyslipidemia. Current antihypertension treatment includes ACE inhibitors and calcium channel blockers. The current treatment provides moderate improvement. Hypertensive end-organ damage  includes CAD/MI.   Hyperlipidemia   This is a chronic problem. The current episode started more than 1 year ago. Pertinent negatives include no chest pain or shortness of breath. Current antihyperlipidemic treatment includes statins. Risk factors for coronary artery disease include male sex, hypertension and dyslipidemia.       The following portions of the patient's history were reviewed and updated as appropriate: allergies, current medications, past family history, past medical history, past social history, past surgical history and problem list.     Allergies   Allergen Reactions   • Penicillins Rash       Current Outpatient Medications:   •  amLODIPine (NORVASC) 10 MG tablet, TAKE ONE TABLET BY MOUTH ONCE DAILY, Disp: 90 tablet, Rfl: 3  •  aspirin 81 MG EC tablet, Take 1 tablet by mouth Daily., Disp: , Rfl:   •  metFORMIN (GLUCOPHAGE) 500 MG tablet, Take 500 mg by mouth Daily., Disp: , Rfl:   •  Misc Natural Products (OSTEO BI-FLEX ADV DOUBLE ST PO), Take  by mouth., Disp: , Rfl:   •  simvastatin (ZOCOR) 40 MG tablet, TAKE ONE TABLET BY MOUTH EVERY NIGHT., Disp: 90 tablet, Rfl: 3  •  lisinopril (PRINIVIL,ZESTRIL) 30 MG tablet, Take 1 tablet by mouth Daily., Disp: 90 tablet, Rfl: 3  Past Medical History:   Diagnosis Date   • Benign essential hypertension    • CAD (coronary artery disease)    • CAD in native artery     Story: lexiscan 1/2012 negative for ischemia   • Enlarged prostate    • Hyperlipemia, mixed     Lipid panel (10/2014) 143/48/81/189   • Hyperlipidemia    • Hypertension    • Myocardial infarction (CMS/HCC)      Past Surgical History:   Procedure Laterality Date   • BACK SURGERY     • CAROTID STENT     • LUMBAR LAMINECTOMY Left 12/31/2019    Procedure: LEFT LUMBAR HEMILAMINECTOMY WITHOUT FUSION AT LUMBAR 3/4 AND 4/5;  Surgeon: Stalin Williamson MD;  Location: [facility] OR;  Service: Neurosurgery   • ROTATOR CUFF REPAIR Bilateral    • SHOULDER SURGERY     • TENNIS ELBOW RELEASE       Family  "History   Problem Relation Age of Onset   • ALS Mother    • Heart attack Father    • Alzheimer's disease Maternal Grandfather    • Heart attack Maternal Grandfather    • Heart attack Paternal Grandfather      Social History     Socioeconomic History   • Marital status:      Spouse name: Not on file   • Number of children: Not on file   • Years of education: Not on file   • Highest education level: Not on file   Tobacco Use   • Smoking status: Former Smoker     Types: Cigarettes     Start date:      Last attempt to quit:      Years since quittin.2   • Smokeless tobacco: Never Used   Substance and Sexual Activity   • Alcohol use: No   • Drug use: No   • Sexual activity: Defer         Review of Systems   Constitution: Negative for chills, decreased appetite, fever, malaise/fatigue, night sweats, weight gain and weight loss.   HENT: Negative for nosebleeds.    Eyes: Negative for blurred vision and visual disturbance.   Cardiovascular: Positive for leg swelling. Negative for chest pain, dyspnea on exertion, near-syncope, orthopnea, palpitations, paroxysmal nocturnal dyspnea and syncope.   Respiratory: Negative for chest tightness, cough, hemoptysis, shortness of breath, snoring and wheezing.    Endocrine: Negative for cold intolerance and heat intolerance.   Hematologic/Lymphatic: Does not bruise/bleed easily.   Skin: Negative for rash.   Musculoskeletal: Negative for back pain, falls, muscle weakness and neck pain.   Gastrointestinal: Negative for abdominal pain, change in bowel habit, constipation, diarrhea, dysphagia, heartburn, nausea and vomiting.   Genitourinary: Negative for hematuria.   Neurological: Negative for dizziness, headaches, light-headedness and weakness.   Psychiatric/Behavioral: Negative for altered mental status.   Allergic/Immunologic: Negative for persistent infections.       ProceduresBP 132/66   Pulse 62   Ht 172.7 cm (68\")   Wt 77.6 kg (171 lb)   SpO2 98%   BMI 26.00 " kg/m²          Objective:     Physical Exam   Constitutional: He is oriented to person, place, and time. Vital signs are normal. He appears well-developed and well-nourished. No distress.   HENT:   Head: Normocephalic and atraumatic.   Right Ear: External ear normal.   Left Ear: External ear normal.   Eyes: Pupils are equal, round, and reactive to light. Conjunctivae are normal. Right eye exhibits no discharge. Left eye exhibits no discharge.   Neck: Normal range of motion. Neck supple. No JVD present. Carotid bruit is not present. No thyromegaly present.   Cardiovascular: Normal rate, regular rhythm, normal heart sounds and intact distal pulses. PMI is not displaced. Exam reveals no gallop, no friction rub and no decreased pulses.   No murmur heard.  Pulses:       Radial pulses are 2+ on the right side, and 2+ on the left side.        Dorsalis pedis pulses are 2+ on the right side, and 2+ on the left side.        Posterior tibial pulses are 2+ on the right side, and 2+ on the left side.   Pulmonary/Chest: Effort normal and breath sounds normal. No respiratory distress. He has no decreased breath sounds. He has no wheezes. He has no rhonchi. He has no rales. He exhibits no tenderness.   Abdominal: Soft. Bowel sounds are normal. He exhibits no distension. There is no tenderness.   Musculoskeletal: Normal range of motion. He exhibits edema (3+ pitting edema in bilateral lower extremities).   Neurological: He is alert and oriented to person, place, and time.   Skin: Skin is warm and dry. No rash noted. He is not diaphoretic. No erythema. No pallor.   Psychiatric: He has a normal mood and affect. His behavior is normal. Judgment and thought content normal.   Vitals reviewed.      Lab Review:   Lab Results   Component Value Date    WBC 9.76 12/30/2019    HGB 15.4 12/30/2019    HCT 44.5 12/30/2019    MCV 84.1 12/30/2019     12/30/2019     Lab Results   Component Value Date    GLUCOSE 117 (H) 12/30/2019    BUN 13  12/30/2019    CREATININE 0.53 (L) 12/30/2019    EGFRIFNONA 148 12/30/2019    BCR 24.5 12/30/2019    K 3.4 (L) 12/30/2019    CO2 29.0 12/30/2019    CALCIUM 9.0 12/30/2019    ALBUMIN 4.40 12/30/2019    AST 13 12/30/2019    ALT 16 12/30/2019     Lab Results   Component Value Date    CHOL 123 (L) 04/18/2018    CHOL 135 02/02/2018    CHLPL 143 10/29/2014     Lab Results   Component Value Date    TRIG 145 04/18/2018    TRIG 65 02/02/2018    TRIG 189 (H) 10/29/2014     Lab Results   Component Value Date    HDL 40 04/18/2018    HDL 59 02/02/2018    HDL 48 10/29/2014     Lab Results   Component Value Date    LDL 61 04/18/2018    LDL 70 02/02/2018    LDL 81 10/29/2014           Assessment:          Diagnosis Plan   1. Coronary artery disease involving native coronary artery of native heart without angina pectoris  No clinical signs of ischemia.    2. History of coronary artery stent placement  Continues on aspirin. Denies bleeding.    3. Essential hypertension  Pt presents BP log with readings in the 140s-170s/70s-90s. Increase lisinopril to 30 mg daily.     4. Mixed hyperlipidemia  Well controlled. Managed by PCP. On statin.    5. Bilateral lower extremity edema Chronic stable. 2D echo normal. Compression stockings.           Plan:       1. Increase lisinopril to 30 mg daily. Continue other medications as previously prescribed.  2. Report any worsening symptoms.  3. Report any signs of bleeding.  4. Continue heart healthy diet and regular exercise as tolerated.   5. Follow up with PCP for blood pressure and cholesterol management and routine lab work.  6. Follow up with Dr. Padilla in one year, or sooner if needed.     Advance Care Planning   ACP discussion was held with the patient during this visit. Patient does not have an advance directive, information provided.

## 2020-08-07 ENCOUNTER — DOCUMENTATION (OUTPATIENT)
Dept: PHYSICAL THERAPY | Facility: CLINIC | Age: 85
End: 2020-08-07

## 2020-08-07 NOTE — PROGRESS NOTES
Outpatient Physical Therapy Discharge Summary         Patient Name: Mickey Conde  : 1935  MRN: [id number]    Today's Date: 2020    Visit Dx:  No diagnosis found.    PT OP Goals     Row Name 20 0822          Long Term Goals    LTG Date to Achieve  20  -WJ     LTG 1  Pt to demonstrate independence with comprehensive HEP.  -WJ     LTG 1 Progress  Not Met  -WJ     LTG 2  Pt to demonstrate B hip flexion of 4/5.  -WJ     LTG 2 Progress  Not Met  -WJ     LTG 3  Pt to demonstrate B hip abduction of 4/5.  -WJ     LTG 3 Progress  Not Met  -WJ     LTG 4  Pt to report pain of <2/10 with forward flexion activities.  -WJ     LTG 4 Progress  Not Met  -WJ     LTG 5  PT to demonstrate improved posture and ability to enage musculature to self corret.  -WJ     LTG 5 Progress  Not Met  -WJ     LTG 6  Pt can navigate 3-4 steps without using his UEs to assist.  -WJ     LTG 6 Progress  Not Met  -WJ       User Key  (r) = Recorded By, (t) = Taken By, (c) = Cosigned By    Initials Name Provider Type    Aamir Zarate, PT DPT Physical Therapist          OP PT Discharge Summary  Date of Discharge: 20  Reason for Discharge: other (comment)(COVID-19)  Outcomes Achieved: Other  Discharge Destination: Unknown  Discharge Instructions/Additional Comments: We had previously been working with this patient prior to COVID-19. His appts were cancelled due to the pandemic. BHARAT sung not heard from him since we have been reopen therefore will D/C at this time.      Time Calculation:                    Aamir Witt PT DPT  2020

## 2021-03-29 ENCOUNTER — OFFICE VISIT (OUTPATIENT)
Dept: CARDIOLOGY | Facility: CLINIC | Age: 86
End: 2021-03-29

## 2021-03-29 VITALS
BODY MASS INDEX: 26.36 KG/M2 | SYSTOLIC BLOOD PRESSURE: 140 MMHG | HEIGHT: 66 IN | WEIGHT: 164 LBS | HEART RATE: 81 BPM | OXYGEN SATURATION: 97 % | DIASTOLIC BLOOD PRESSURE: 80 MMHG

## 2021-03-29 DIAGNOSIS — E78.2 MIXED HYPERLIPIDEMIA: ICD-10-CM

## 2021-03-29 DIAGNOSIS — I10 ESSENTIAL HYPERTENSION: ICD-10-CM

## 2021-03-29 DIAGNOSIS — I25.10 CORONARY ARTERY DISEASE INVOLVING NATIVE CORONARY ARTERY OF NATIVE HEART WITHOUT ANGINA PECTORIS: Primary | ICD-10-CM

## 2021-03-29 PROCEDURE — 93000 ELECTROCARDIOGRAM COMPLETE: CPT | Performed by: INTERNAL MEDICINE

## 2021-03-29 PROCEDURE — 99213 OFFICE O/P EST LOW 20 MIN: CPT | Performed by: INTERNAL MEDICINE

## 2021-03-29 NOTE — ASSESSMENT & PLAN NOTE
The patient denies chest pain, shortness of breath, dyspnea on exertion, orthopnea, PND, edema. There is no evidence of ongoing ischemia. Has new LBBB (since 2019) but is asymptomatic.

## 2021-03-29 NOTE — PROGRESS NOTES
Referring Provider: Ki Tamez MD    Reason for Follow-up Visit: CAD    Subjective .   Chief Complaint:   Chief Complaint   Patient presents with   • Coronary Artery Disease     1 yr f/u    • Hyperlipidemia     lipid in chart, done 2020   • Hypertension     checks b/p weekly, states it runs a little high   • Leg Swelling     left leg, hasn't gone away       History of present illness:  Mickey Conde is a 85 y.o. yo male with history of CAD, s/p PCI in the remote past. Denies any chest pain or SOB.        History  Past Medical History:   Diagnosis Date   • Benign essential hypertension    • CAD (coronary artery disease)    • CAD in native artery     Story: lexiscan 2012 negative for ischemia   • Enlarged prostate    • Hyperlipemia, mixed     Lipid panel (10/2014) 143/48/81/189   • Hyperlipidemia    • Hypertension    • Myocardial infarction (CMS/HCC)    ,   Past Surgical History:   Procedure Laterality Date   • BACK SURGERY     • CAROTID STENT     • LUMBAR LAMINECTOMY Left 2019    Procedure: LEFT LUMBAR HEMILAMINECTOMY WITHOUT FUSION AT LUMBAR 3/4 AND 4/5;  Surgeon: Stalin Williamson MD;  Location: [facility] OR;  Service: Neurosurgery   • ROTATOR CUFF REPAIR Bilateral    • SHOULDER SURGERY     • TENNIS ELBOW RELEASE     ,   Family History   Problem Relation Age of Onset   • ALS Mother    • Heart attack Father    • Alzheimer's disease Maternal Grandfather    • Heart attack Maternal Grandfather    • Heart attack Paternal Grandfather    ,   Social History     Tobacco Use   • Smoking status: Former Smoker     Types: Cigarettes     Start date:      Quit date:      Years since quittin.2   • Smokeless tobacco: Never Used   Vaping Use   • Vaping Use: Never used   Substance Use Topics   • Alcohol use: No   • Drug use: No   ,     Medications  Current Outpatient Medications   Medication Sig Dispense Refill   • amLODIPine (NORVASC) 10 MG tablet Take 1 tablet by mouth Daily. 90 tablet 3   • aspirin  "81 MG EC tablet Take 1 tablet by mouth Daily.     • lisinopril (PRINIVIL,ZESTRIL) 30 MG tablet Take 1 tablet by mouth Daily. 90 tablet 3   • Misc Natural Products (OSTEO BI-FLEX ADV DOUBLE ST PO) Take  by mouth.     • Sennosides-Docusate Sodium (STOOL SOFTENER & LAXATIVE PO) Take  by mouth 2 (Two) Times a Day As Needed.     • simvastatin (ZOCOR) 40 MG tablet Take 1 tablet by mouth every night at bedtime. 90 tablet 3     No current facility-administered medications for this visit.       Allergies:  Penicillins    Review of Systems  Review of Systems   HENT: Negative for nosebleeds.    Cardiovascular: Negative for chest pain, claudication, dyspnea on exertion, leg swelling, near-syncope, orthopnea, palpitations, paroxysmal nocturnal dyspnea and syncope.   Respiratory: Negative for hemoptysis and shortness of breath.    Musculoskeletal: Positive for back pain.   Gastrointestinal: Negative for melena.   Genitourinary: Negative for hematuria.       Objective     Physical Exam:  /80   Pulse 81   Ht 167.6 cm (66\")   Wt 74.4 kg (164 lb)   SpO2 97%   BMI 26.47 kg/m²   Pulmonary:      Effort: Pulmonary effort is normal.      Breath sounds: Normal breath sounds.   Cardiovascular:      Normal rate. Regular rhythm.      Murmurs: There is no murmur.   Edema:     Peripheral edema present.     Pretibial: bilateral 1+ edema of the pretibial area.     Ankle: bilateral 1+ edema of the ankle.        Results Review:    ECG 12 Lead    Date/Time: 3/29/2021 11:14 AM  Performed by: Delano Padilla MD  Authorized by: Delano Padilla MD   Comparison: compared with previous ECG   Comparison to previous ECG: LBBB is new  Rhythm: sinus rhythm  Rate: normal  Conduction: left bundle branch block  QRS axis: normal    Clinical impression: abnormal EKG            Hospital Outpatient Visit on 03/04/2020   Component Date Value Ref Range Status   • BSA 03/04/2020 1.9  m^2 Final   • IVSd 03/04/2020 1.1  cm Final   • LVIDd 03/04/2020 2.6  cm " Final   • LVIDs 03/04/2020 1.8  cm Final   • LVPWd 03/04/2020 1.2  cm Final   • IVS/LVPW 03/04/2020 0.86   Final   • FS 03/04/2020 29.2  % Final   • EDV(Teich) 03/04/2020 23.9  ml Final   • ESV(Teich) 03/04/2020 10.0  ml Final   • EF(Teich) 03/04/2020 58.2  % Final   • EDV(cubed) 03/04/2020 17.0  ml Final   • ESV(cubed) 03/04/2020 6.0  ml Final   • EF(cubed) 03/04/2020 64.5  % Final   • LV mass(C)d 03/04/2020 82.0  grams Final   • LV mass(C)dI 03/04/2020 43.0  grams/m^2 Final   • SV(Teich) 03/04/2020 13.9  ml Final   • SI(Teich) 03/04/2020 7.3  ml/m^2 Final   • SV(cubed) 03/04/2020 10.9  ml Final   • SI(cubed) 03/04/2020 5.7  ml/m^2 Final   • Ao root diam 03/04/2020 2.9  cm Final   • Ao root area 03/04/2020 6.6  cm^2 Final   • LA dimension 03/04/2020 3.2  cm Final   • LA/Ao 03/04/2020 1.1   Final   • LVOT diam 03/04/2020 2.3  cm Final   • LVOT area 03/04/2020 4.2  cm^2 Final   • LVOT area(traced) 03/04/2020 4.2  cm^2 Final   • LVLd ap4 03/04/2020 8.1  cm Final   • EDV(MOD-sp4) 03/04/2020 117.0  ml Final   • LVLs ap4 03/04/2020 6.8  cm Final   • ESV(MOD-sp4) 03/04/2020 41.1  ml Final   • EF(MOD-sp4) 03/04/2020 64.9  % Final   • SV(MOD-sp4) 03/04/2020 75.9  ml Final   • SI(MOD-sp4) 03/04/2020 39.8  ml/m^2 Final   • Ao root area (BSA corrected) 03/04/2020 1.5   Final   • LV Garcia Vol (BSA corrected) 03/04/2020 61.3  ml/m^2 Final   • LV Sys Vol (BSA corrected) 03/04/2020 21.5  ml/m^2 Final   • MV E max nafisa 03/04/2020 25.8  cm/sec Final   • MV A max nafisa 03/04/2020 80.0  cm/sec Final   • MV E/A 03/04/2020 0.32   Final   • MV dec slope 03/04/2020 260.0  cm/sec^2 Final   • MV dec time 03/04/2020 0.18  sec Final   • Ao pk nafisa 03/04/2020 117.0  cm/sec Final   • Ao max PG 03/04/2020 5.5  mmHg Final   • Ao max PG (full) 03/04/2020 2.8  mmHg Final   • Ao V2 mean 03/04/2020 88.1  cm/sec Final   • Ao mean PG 03/04/2020 3.5  mmHg Final   • Ao mean PG (full) 03/04/2020 2.5  mmHg Final   • Ao V2 VTI 03/04/2020 27.5  cm Final   •  NETTA(I,A) 03/04/2020 2.8  cm^2 Final   • NETTA(I,D) 03/04/2020 2.8  cm^2 Final   • NETTA(V,A) 03/04/2020 2.9  cm^2 Final   • NETTA(V,D) 03/04/2020 2.9  cm^2 Final   • LV V1 max PG 03/04/2020 2.6  mmHg Final   • LV V1 mean PG 03/04/2020 1.0  mmHg Final   • LV V1 max 03/04/2020 81.3  cm/sec Final   • LV V1 mean 03/04/2020 52.1  cm/sec Final   • LV V1 VTI 03/04/2020 18.6  cm Final   • MR max klever 03/04/2020 321.0  cm/sec Final   • MR max PG 03/04/2020 41.2  mmHg Final   • SV(Ao) 03/04/2020 181.3  ml Final   • SI(Ao) 03/04/2020 95.1  ml/m^2 Final   • SV(LVOT) 03/04/2020 77.3  ml Final   • SI(LVOT) 03/04/2020 40.5  ml/m^2 Final   •  CV ECHO LIA - BZI_BMI 03/04/2020 25.8  kilograms/m^2 Final   •  CV ECHO LIA - BSA(HAYCOCK) 03/04/2020 1.9  m^2 Final   •  CV ECHO LIA - BZI_METRIC_WEIGHT 03/04/2020 77.1  kg Final   •  CV ECHO LIA - BZI_METRIC_HEIGHT 03/04/2020 172.7  cm Final   • LA volume 03/04/2020 43.5  cm3 Final   • LA Volume Index 03/04/2020 22.8  mL/m2 Final   • Lat Peak E' Klever 03/04/2020 6.7  cm/sec Final   • Echo EF Estimated 03/04/2020 65  % Final       Assessment/Plan   Problem List Items Addressed This Visit        Cardiac and Vasculature    CAD (coronary artery disease)    Current Assessment & Plan     The patient denies chest pain, shortness of breath, dyspnea on exertion, orthopnea, PND, edema. There is no evidence of ongoing ischemia. Has new LBBB (since 2019) but is asymptomatic.           HTN (hypertension)    Current Assessment & Plan     Borderline control         Hyperlipidemia - Primary    Current Assessment & Plan     Patient's statin therapy is followed by PCP.                 Medical Complexity  must have 1 out of 3     Moderate Complexity Level 3           1 of the following medical problems:          []One chronic illness with mild exacerbation         [x]Two or more stable chronic illness          []One new problem  []One acute illness with systemic symptoms    Complexity of Data  Reviewed (1  out of the 3 following categories)      Category 1 tests, documents, historian (must have 3 points)       []Review of prior external records  [x]Review of results of unique tests  []Ordering unique tests  []Assessment requires an independent historian    Category 2 Interpretation of tests   []Independent interpretation of test read by another doc    Category 3 Discuss Management/tests  []Discussion with external physician    Risk of complications and/or morbidity          [x]Prescription Drug Management

## 2021-09-03 ENCOUNTER — HOSPITAL ENCOUNTER (INPATIENT)
Facility: HOSPITAL | Age: 86
LOS: 1 days | Discharge: HOME | End: 2021-09-04
Attending: EMERGENCY MEDICINE | Admitting: FAMILY MEDICINE

## 2021-09-03 ENCOUNTER — APPOINTMENT (OUTPATIENT)
Dept: GENERAL RADIOLOGY | Facility: HOSPITAL | Age: 86
End: 2021-09-03

## 2021-09-03 ENCOUNTER — APPOINTMENT (OUTPATIENT)
Dept: CT IMAGING | Facility: HOSPITAL | Age: 86
End: 2021-09-03

## 2021-09-03 DIAGNOSIS — J96.01 ACUTE RESPIRATORY FAILURE WITH HYPOXIA (HCC): ICD-10-CM

## 2021-09-03 DIAGNOSIS — R07.2 PRECORDIAL PAIN: Primary | ICD-10-CM

## 2021-09-03 DIAGNOSIS — I50.9 PLEURAL EFFUSION DUE TO CHF (CONGESTIVE HEART FAILURE) (HCC): ICD-10-CM

## 2021-09-03 DIAGNOSIS — R06.02 SHORTNESS OF BREATH: ICD-10-CM

## 2021-09-03 DIAGNOSIS — I48.91 ATRIAL FIBRILLATION WITH RAPID VENTRICULAR RESPONSE (HCC): ICD-10-CM

## 2021-09-03 PROBLEM — J81.0 ACUTE PULMONARY EDEMA: Status: ACTIVE | Noted: 2021-09-03

## 2021-09-03 PROBLEM — M48.062 SPINAL STENOSIS, LUMBAR REGION, WITH NEUROGENIC CLAUDICATION: Status: RESOLVED | Noted: 2019-12-30 | Resolved: 2021-09-03

## 2021-09-03 LAB
ALBUMIN SERPL-MCNC: 4.7 G/DL (ref 3.5–5.2)
ALBUMIN/GLOB SERPL: 2.5 G/DL
ALP SERPL-CCNC: 66 U/L (ref 39–117)
ALT SERPL W P-5'-P-CCNC: 75 U/L (ref 1–41)
ANION GAP SERPL CALCULATED.3IONS-SCNC: 12 MMOL/L (ref 5–15)
APTT PPP: 24.5 SECONDS (ref 24.1–35)
ARTERIAL PATENCY WRIST A: POSITIVE
AST SERPL-CCNC: 25 U/L (ref 1–40)
ATMOSPHERIC PRESS: 752 MMHG
BASE EXCESS BLDA CALC-SCNC: 0.8 MMOL/L (ref 0–2)
BASOPHILS # BLD AUTO: 0.07 10*3/MM3 (ref 0–0.2)
BASOPHILS NFR BLD AUTO: 0.4 % (ref 0–1.5)
BDY SITE: ABNORMAL
BILIRUB SERPL-MCNC: 1.2 MG/DL (ref 0–1.2)
BODY TEMPERATURE: 37 C
BUN SERPL-MCNC: 21 MG/DL (ref 8–23)
BUN/CREAT SERPL: 28.8 (ref 7–25)
CALCIUM SPEC-SCNC: 8.9 MG/DL (ref 8.6–10.5)
CHLORIDE SERPL-SCNC: 106 MMOL/L (ref 98–107)
CO2 SERPL-SCNC: 24 MMOL/L (ref 22–29)
CREAT SERPL-MCNC: 0.73 MG/DL (ref 0.76–1.27)
D DIMER PPP FEU-MCNC: 0.82 MG/L (FEU) (ref 0–0.5)
DEPRECATED RDW RBC AUTO: 46.4 FL (ref 37–54)
EOSINOPHIL # BLD AUTO: 0.09 10*3/MM3 (ref 0–0.4)
EOSINOPHIL NFR BLD AUTO: 0.5 % (ref 0.3–6.2)
ERYTHROCYTE [DISTWIDTH] IN BLOOD BY AUTOMATED COUNT: 14.6 % (ref 12.3–15.4)
GAS FLOW AIRWAY: 3 LPM
GFR SERPL CREATININE-BSD FRML MDRD: 102 ML/MIN/1.73
GLOBULIN UR ELPH-MCNC: 1.9 GM/DL
GLUCOSE SERPL-MCNC: 215 MG/DL (ref 65–99)
HCO3 BLDA-SCNC: 26.6 MMOL/L (ref 20–26)
HCT VFR BLD AUTO: 51.2 % (ref 37.5–51)
HGB BLD-MCNC: 17.2 G/DL (ref 13–17.7)
HOLD SPECIMEN: NORMAL
IMM GRANULOCYTES # BLD AUTO: 0.2 10*3/MM3 (ref 0–0.05)
IMM GRANULOCYTES NFR BLD AUTO: 1 % (ref 0–0.5)
INR PPP: 1 (ref 0.91–1.09)
LYMPHOCYTES # BLD AUTO: 2.54 10*3/MM3 (ref 0.7–3.1)
LYMPHOCYTES NFR BLD AUTO: 13.2 % (ref 19.6–45.3)
Lab: ABNORMAL
MCH RBC QN AUTO: 29.9 PG (ref 26.6–33)
MCHC RBC AUTO-ENTMCNC: 33.6 G/DL (ref 31.5–35.7)
MCV RBC AUTO: 88.9 FL (ref 79–97)
MODALITY: ABNORMAL
MONOCYTES # BLD AUTO: 1.9 10*3/MM3 (ref 0.1–0.9)
MONOCYTES NFR BLD AUTO: 9.9 % (ref 5–12)
NEUTROPHILS NFR BLD AUTO: 14.39 10*3/MM3 (ref 1.7–7)
NEUTROPHILS NFR BLD AUTO: 75 % (ref 42.7–76)
NRBC BLD AUTO-RTO: 0 /100 WBC (ref 0–0.2)
NT-PROBNP SERPL-MCNC: 1556 PG/ML (ref 0–1800)
PCO2 BLDA: 45.4 MM HG (ref 35–45)
PCO2 TEMP ADJ BLD: 45.4 MM HG (ref 35–45)
PH BLDA: 7.38 PH UNITS (ref 7.35–7.45)
PH, TEMP CORRECTED: 7.38 PH UNITS (ref 7.35–7.45)
PLATELET # BLD AUTO: 273 10*3/MM3 (ref 140–450)
PMV BLD AUTO: 10.1 FL (ref 6–12)
PO2 BLDA: 63.9 MM HG (ref 83–108)
PO2 TEMP ADJ BLD: 63.9 MM HG (ref 83–108)
POTASSIUM SERPL-SCNC: 3.7 MMOL/L (ref 3.5–5.2)
PROT SERPL-MCNC: 6.6 G/DL (ref 6–8.5)
PROTHROMBIN TIME: 12.4 SECONDS (ref 11.5–13.4)
QT INTERVAL: 362 MS
QTC INTERVAL: 503 MS
RBC # BLD AUTO: 5.76 10*6/MM3 (ref 4.14–5.8)
SAO2 % BLDCOA: 91.5 % (ref 94–99)
SARS-COV-2 RNA PNL SPEC NAA+PROBE: NOT DETECTED
SODIUM SERPL-SCNC: 142 MMOL/L (ref 136–145)
TROPONIN T SERPL-MCNC: <0.01 NG/ML (ref 0–0.03)
TSH SERPL DL<=0.05 MIU/L-ACNC: 0.72 UIU/ML (ref 0.27–4.2)
VENTILATOR MODE: ABNORMAL
WBC # BLD AUTO: 19.19 10*3/MM3 (ref 3.4–10.8)
WHOLE BLOOD HOLD SPECIMEN: NORMAL
WHOLE BLOOD HOLD SPECIMEN: NORMAL

## 2021-09-03 PROCEDURE — 36600 WITHDRAWAL OF ARTERIAL BLOOD: CPT

## 2021-09-03 PROCEDURE — 82803 BLOOD GASES ANY COMBINATION: CPT

## 2021-09-03 PROCEDURE — 71045 X-RAY EXAM CHEST 1 VIEW: CPT

## 2021-09-03 PROCEDURE — 99285 EMERGENCY DEPT VISIT HI MDM: CPT

## 2021-09-03 PROCEDURE — 80053 COMPREHEN METABOLIC PANEL: CPT | Performed by: EMERGENCY MEDICINE

## 2021-09-03 PROCEDURE — 84484 ASSAY OF TROPONIN QUANT: CPT | Performed by: EMERGENCY MEDICINE

## 2021-09-03 PROCEDURE — 71275 CT ANGIOGRAPHY CHEST: CPT

## 2021-09-03 PROCEDURE — 84443 ASSAY THYROID STIM HORMONE: CPT | Performed by: FAMILY MEDICINE

## 2021-09-03 PROCEDURE — 25010000002 FUROSEMIDE PER 20 MG: Performed by: EMERGENCY MEDICINE

## 2021-09-03 PROCEDURE — 85730 THROMBOPLASTIN TIME PARTIAL: CPT | Performed by: EMERGENCY MEDICINE

## 2021-09-03 PROCEDURE — 0 IOPAMIDOL PER 1 ML: Performed by: EMERGENCY MEDICINE

## 2021-09-03 PROCEDURE — 85610 PROTHROMBIN TIME: CPT | Performed by: EMERGENCY MEDICINE

## 2021-09-03 PROCEDURE — 36415 COLL VENOUS BLD VENIPUNCTURE: CPT

## 2021-09-03 PROCEDURE — 93005 ELECTROCARDIOGRAM TRACING: CPT | Performed by: EMERGENCY MEDICINE

## 2021-09-03 PROCEDURE — 83880 ASSAY OF NATRIURETIC PEPTIDE: CPT | Performed by: EMERGENCY MEDICINE

## 2021-09-03 PROCEDURE — 87635 SARS-COV-2 COVID-19 AMP PRB: CPT | Performed by: EMERGENCY MEDICINE

## 2021-09-03 PROCEDURE — 93010 ELECTROCARDIOGRAM REPORT: CPT | Performed by: EMERGENCY MEDICINE

## 2021-09-03 PROCEDURE — 25010000002 FUROSEMIDE PER 20 MG: Performed by: FAMILY MEDICINE

## 2021-09-03 PROCEDURE — 85025 COMPLETE CBC W/AUTO DIFF WBC: CPT | Performed by: EMERGENCY MEDICINE

## 2021-09-03 PROCEDURE — 85379 FIBRIN DEGRADATION QUANT: CPT | Performed by: EMERGENCY MEDICINE

## 2021-09-03 RX ORDER — FUROSEMIDE 10 MG/ML
40 INJECTION INTRAMUSCULAR; INTRAVENOUS ONCE
Status: COMPLETED | OUTPATIENT
Start: 2021-09-03 | End: 2021-09-03

## 2021-09-03 RX ORDER — ONDANSETRON 2 MG/ML
4 INJECTION INTRAMUSCULAR; INTRAVENOUS EVERY 6 HOURS PRN
Status: DISCONTINUED | OUTPATIENT
Start: 2021-09-03 | End: 2021-09-04 | Stop reason: HOSPADM

## 2021-09-03 RX ORDER — ASPIRIN 81 MG/1
324 TABLET, CHEWABLE ORAL ONCE
Status: COMPLETED | OUTPATIENT
Start: 2021-09-03 | End: 2021-09-03

## 2021-09-03 RX ORDER — ATORVASTATIN CALCIUM 10 MG/1
20 TABLET, FILM COATED ORAL DAILY
Status: DISCONTINUED | OUTPATIENT
Start: 2021-09-03 | End: 2021-09-04 | Stop reason: HOSPADM

## 2021-09-03 RX ORDER — DILTIAZEM HYDROCHLORIDE 60 MG/1
60 TABLET, FILM COATED ORAL EVERY 6 HOURS SCHEDULED
Status: DISCONTINUED | OUTPATIENT
Start: 2021-09-03 | End: 2021-09-04

## 2021-09-03 RX ORDER — SODIUM CHLORIDE 0.9 % (FLUSH) 0.9 %
10 SYRINGE (ML) INJECTION AS NEEDED
Status: DISCONTINUED | OUTPATIENT
Start: 2021-09-03 | End: 2021-09-04 | Stop reason: HOSPADM

## 2021-09-03 RX ORDER — SODIUM CHLORIDE 0.9 % (FLUSH) 0.9 %
10 SYRINGE (ML) INJECTION EVERY 12 HOURS SCHEDULED
Status: DISCONTINUED | OUTPATIENT
Start: 2021-09-03 | End: 2021-09-04 | Stop reason: HOSPADM

## 2021-09-03 RX ORDER — ALUMINA, MAGNESIA, AND SIMETHICONE 2400; 2400; 240 MG/30ML; MG/30ML; MG/30ML
15 SUSPENSION ORAL EVERY 6 HOURS PRN
Status: DISCONTINUED | OUTPATIENT
Start: 2021-09-03 | End: 2021-09-04 | Stop reason: HOSPADM

## 2021-09-03 RX ORDER — AMOXICILLIN 250 MG
1 CAPSULE ORAL 2 TIMES DAILY
Status: DISCONTINUED | OUTPATIENT
Start: 2021-09-03 | End: 2021-09-04 | Stop reason: HOSPADM

## 2021-09-03 RX ORDER — ASPIRIN 81 MG/1
81 TABLET ORAL DAILY
Status: DISCONTINUED | OUTPATIENT
Start: 2021-09-03 | End: 2021-09-04 | Stop reason: HOSPADM

## 2021-09-03 RX ORDER — ACETAMINOPHEN 325 MG/1
650 TABLET ORAL EVERY 4 HOURS PRN
Status: DISCONTINUED | OUTPATIENT
Start: 2021-09-03 | End: 2021-09-04 | Stop reason: HOSPADM

## 2021-09-03 RX ADMIN — DILTIAZEM HYDROCHLORIDE 60 MG: 60 TABLET, FILM COATED ORAL at 23:20

## 2021-09-03 RX ADMIN — SODIUM CHLORIDE, PRESERVATIVE FREE 10 ML: 5 INJECTION INTRAVENOUS at 21:03

## 2021-09-03 RX ADMIN — ASPIRIN 324 MG: 81 TABLET, CHEWABLE ORAL at 06:51

## 2021-09-03 RX ADMIN — DOCUSATE SODIUM 50 MG AND SENNOSIDES 8.6 MG 1 TABLET: 8.6; 5 TABLET, FILM COATED ORAL at 21:03

## 2021-09-03 RX ADMIN — SODIUM CHLORIDE, POTASSIUM CHLORIDE, SODIUM LACTATE AND CALCIUM CHLORIDE 500 ML: 600; 310; 30; 20 INJECTION, SOLUTION INTRAVENOUS at 06:53

## 2021-09-03 RX ADMIN — DILTIAZEM HYDROCHLORIDE 5 MG/HR: 5 INJECTION, SOLUTION INTRAVENOUS at 08:04

## 2021-09-03 RX ADMIN — LISINOPRIL 30 MG: 20 TABLET ORAL at 18:58

## 2021-09-03 RX ADMIN — FUROSEMIDE 40 MG: 10 INJECTION, SOLUTION INTRAVENOUS at 09:14

## 2021-09-03 RX ADMIN — APIXABAN 5 MG: 5 TABLET, FILM COATED ORAL at 21:03

## 2021-09-03 RX ADMIN — ATORVASTATIN CALCIUM 20 MG: 10 TABLET, FILM COATED ORAL at 18:57

## 2021-09-03 RX ADMIN — IOPAMIDOL 100 ML: 755 INJECTION, SOLUTION INTRAVENOUS at 08:29

## 2021-09-03 RX ADMIN — DILTIAZEM HYDROCHLORIDE 60 MG: 60 TABLET, FILM COATED ORAL at 18:58

## 2021-09-03 RX ADMIN — FUROSEMIDE 40 MG: 10 INJECTION, SOLUTION INTRAMUSCULAR; INTRAVENOUS at 18:58

## 2021-09-03 NOTE — ED PROVIDER NOTES
Emergency Medicine Provider Note    Subjective:    HISTORY OF PRESENT ILLNESS     This is a very pleasant 86 y.o. male with a past medical history of hypertension, coronary artery disease, hyperlipidemia who presents to the emergency department today with a chief complaint of chest pain and shortness of breath.  Patient states this started yesterday afternoon.  It is constant.  Moderate.  No exacerbating relieving factors.  Patient states she woke up with chills but denies any fevers.  Has no cough.  No abdominal pain.  No nausea or vomiting.  No numbness, weakness, or paresthesias.  States his pain is retrosternal and dull.          History is obtained from the patient.       Review of Systems: All other systems are reviewed and are negative other than noted in the HPI.    Past Medical History:  Past Medical History:   Diagnosis Date   • Benign essential hypertension    • CAD (coronary artery disease)    • CAD in native artery     Story: lexiscan 1/2012 negative for ischemia   • Degeneration of lumbar or lumbosacral intervertebral disc 11/13/2019   • Enlarged prostate    • History of coronary artery stent placement 2/11/2019   • HTN (hypertension) 1/26/2017   • Hyperlipemia, mixed     Lipid panel (10/2014) 143/48/81/189   • Hyperlipidemia    • Hypertension    • Myocardial infarct (CMS/HCC) 1/26/2017   • Myocardial infarction (CMS/HCC)        Allergies:  Allergies   Allergen Reactions   • Penicillins Rash       Past Surgical History:  Past Surgical History:   Procedure Laterality Date   • BACK SURGERY     • CAROTID STENT     • LUMBAR LAMINECTOMY Left 12/31/2019    Procedure: LEFT LUMBAR HEMILAMINECTOMY WITHOUT FUSION AT LUMBAR 3/4 AND 4/5;  Surgeon: Stalin Williamson MD;  Location: [facility] OR;  Service: Neurosurgery   • ROTATOR CUFF REPAIR Bilateral    • SHOULDER SURGERY     • TENNIS ELBOW RELEASE         Family History:  Family History   Problem Relation Age of Onset   • ALS Mother    • Heart attack Father     • Alzheimer's disease Maternal Grandfather    • Heart attack Maternal Grandfather    • Heart attack Paternal Grandfather        Social History:  Social History     Socioeconomic History   • Marital status:      Spouse name: Not on file   • Number of children: Not on file   • Years of education: Not on file   • Highest education level: Not on file   Tobacco Use   • Smoking status: Former Smoker     Types: Cigarettes     Start date:      Quit date:      Years since quittin.6   • Smokeless tobacco: Former User   Vaping Use   • Vaping Use: Never used   Substance and Sexual Activity   • Alcohol use: No   • Drug use: No   • Sexual activity: Defer       Home Medications:  Prior to Admission medications    Medication Sig Start Date End Date Taking? Authorizing Provider   amLODIPine (NORVASC) 10 MG tablet Take 1 tablet by mouth Daily. 3/23/20   Briseyda Rios APRN   aspirin 81 MG EC tablet Take 1 tablet by mouth Daily. 20   Gregg Worrell APRN   lisinopril (PRINIVIL,ZESTRIL) 30 MG tablet Take 1 tablet by mouth Daily. 3/23/20   Briseyda Rios APRN   Misc Natural Products (OSTEO BI-FLEX ADV DOUBLE ST PO) Take  by mouth.    Provider, MD Michelle   Sennosides-Docusate Sodium (STOOL SOFTENER & LAXATIVE PO) Take  by mouth 2 (Two) Times a Day As Needed.    Provider, MD Michelle   simvastatin (ZOCOR) 40 MG tablet Take 1 tablet by mouth every night at bedtime. 3/23/20   Briseyda Rios APRN         Objective:    PHYSICAL EXAM     Vitals:   Vitals:    21 0952   BP:    Pulse: 99   Resp:    Temp:    SpO2: 94%     GENERAL: Well appearing, in no acute distress.   HEENT: Moist mucous membranes, oropharynx clear without lesions, exudates, thrush.   EYES: No scleral icterus, conjunctivae clear.   NECK: No cervical lymphadenopathy, no stiffness.  CARDIAC: Tachycardic, irregular rhythm no murmurs, 2+ peripheral pulses in all four extremities, normal capillary refill.   PULMONARY: Slightly  increased work of breathing on room air, lungs are clear to auscultation bilaterally without wheezes, crackles, rhonchi.  ABDOMINAL: Normal bowel sounds, abdomen is soft, non-tender, non-distended, no hepatomegaly or splenomegaly.   MUSCULOSKELETAL: Normal range of motion, no lower extremity edema.  NEUROLOGIC: Alert and oriented x 3, EOM grossly intact and moves all four extremities with normal strength.  SKIN: Warm and dry without rashes.   PSYCHIATRIC: Mood and affect are normal.     PROCEDURES     Procedures    LAB AND RADIOLOGY RESULTS     Lab Results (last 24 hours)     Procedure Component Value Units Date/Time    [facility] Blood Culture Bottle Set [[id number]] Collected: 09/03/21 0632    Specimen: Blood from Arm, Left Updated: 09/03/21 0745     Extra Tube Hold for add-ons.     Comment: Auto resulted.       aPTT [[id number]]  (Normal) Collected: 09/03/21 0632    Specimen: Blood from Arm, Left Updated: 09/03/21 0652     PTT 24.5 seconds     BNP [[id number]]  (Normal) Collected: 09/03/21 0632    Specimen: Blood from Arm, Left Updated: 09/03/21 0712     proBNP 1,556.0 pg/mL     Narrative:      Among patients with dyspnea, NT-proBNP is highly sensitive for the detection of acute congestive heart failure. In addition NT-proBNP of <300 pg/ml effectively rules out acute congestive heart failure with 99% negative predictive value.    Results may be falsely decreased if patient taking Biotin.      CBC Auto Differential [[id number]]  (Abnormal) Collected: 09/03/21 0632    Specimen: Blood from Arm, Left Updated: 09/03/21 0644     WBC 19.19 10*3/mm3      RBC 5.76 10*6/mm3      Hemoglobin 17.2 g/dL      Hematocrit 51.2 %      MCV 88.9 fL      MCH 29.9 pg      MCHC 33.6 g/dL      RDW 14.6 %      RDW-SD 46.4 fl      MPV 10.1 fL      Platelets 273 10*3/mm3      Neutrophil % 75.0 %      Lymphocyte % 13.2 %      Monocyte % 9.9 %      Eosinophil % 0.5 %      Basophil % 0.4 %      Immature Grans % 1.0 %      Neutrophils, Absolute 14.39  10*3/mm3      Lymphocytes, Absolute 2.54 10*3/mm3      Monocytes, Absolute 1.90 10*3/mm3      Eosinophils, Absolute 0.09 10*3/mm3      Basophils, Absolute 0.07 10*3/mm3      Immature Grans, Absolute 0.20 10*3/mm3      nRBC 0.0 /100 WBC     Comprehensive Metabolic Panel [[id number]]  (Abnormal) Collected: 09/03/21 0632    Specimen: Blood from Arm, Left Updated: 09/03/21 0712     Glucose 215 mg/dL      BUN 21 mg/dL      Creatinine 0.73 mg/dL      Sodium 142 mmol/L      Potassium 3.7 mmol/L      Chloride 106 mmol/L      CO2 24.0 mmol/L      Calcium 8.9 mg/dL      Total Protein 6.6 g/dL      Albumin 4.70 g/dL      ALT (SGPT) 75 U/L      AST (SGOT) 25 U/L      Alkaline Phosphatase 66 U/L      Total Bilirubin 1.2 mg/dL      eGFR Non African Amer 102 mL/min/1.73      Globulin 1.9 gm/dL      A/G Ratio 2.5 g/dL      BUN/Creatinine Ratio 28.8     Anion Gap 12.0 mmol/L     Narrative:      GFR Normal >60  Chronic Kidney Disease <60  Kidney Failure <15      Protime-INR [[id number]]  (Normal) Collected: 09/03/21 0632    Specimen: Blood from Arm, Left Updated: 09/03/21 0652     Protime 12.4 Seconds      INR 1.00    D-dimer, Quantitative [[id number]]  (Abnormal) Collected: 09/03/21 0632    Specimen: Blood from Arm, Left Updated: 09/03/21 0652     D-Dimer, Quantitative 0.82 mg/L (FEU)     Narrative:      Reference Range is 0-0.50 mg/L FEU. However, results <0.50 mg/L FEU tends to rule out DVT or PE. Results >0.50 mg/L FEU are not useful in predicting absence or presence of DVT or PE.      Blood Gas, Arterial - [[id number]]  (Abnormal) Collected: 09/03/21 0645    Specimen: Arterial Blood Updated: 09/03/21 0648     Site Right Radial     Vadim's Test Positive     pH, Arterial 7.376 pH units      pCO2, Arterial 45.4 mm Hg      Comment: 83 Value above reference range        pO2, Arterial 63.9 mm Hg      Comment: 84 Value below reference range        HCO3, Arterial 26.6 mmol/L      Comment: 83 Value above reference range        Base Excess,  Arterial 0.8 mmol/L      O2 Saturation, Arterial 91.5 %      Comment: 84 Value below reference range        Temperature 37.0 C      Barometric Pressure for Blood Gas 752 mmHg      Modality Nasal Cannula     Flow Rate 3.0 lpm      Ventilator Mode NA     Collected by [id number]     Comment: Meter: [id number]     :  [id number]        pCO2, Temperature Corrected 45.4 mm Hg      pH, Temp Corrected 7.376 pH Units      pO2, Temperature Corrected 63.9 mm Hg     COVID-19,[facility] IN-HOUSE,Nasal Swab No Transport Media 3-4 HR TAT - Swab, Nasal Cavity [[id number]]  (Normal) Collected: 09/03/21 0656    Specimen: Swab from Nasal Cavity Updated: 09/03/21 0744     COVID19 Not Detected    Narrative:      Fact sheet for providers: https://www.fda.gov/media/347856/download     Fact sheet for patients: https://www.fda.gov/media/472978/download    Test performed by PCR.    Consider negative results in combination with clinical observations, patient history, and epidemiological information.    Troponin [[id number]]  (Normal) Collected: 09/03/21 0838    Specimen: Blood Updated: 09/03/21 0909     Troponin T <0.010 ng/mL     Narrative:      Troponin T Reference Range:  <= 0.03 ng/mL-   Negative for AMI  >0.03 ng/mL-     Abnormal for myocardial necrosis.  Clinicians would have to utilize clinical acumen, EKG, Troponin and serial changes to determine if it is an Acute Myocardial Infarction or myocardial injury due to an underlying chronic condition.       Results may be falsely decreased if patient taking Biotin.            XR Chest 1 View    Result Date: 9/3/2021  Narrative: Frontal upright radiograph of the chest 9/3/2021 6:33 AM CDT  HISTORY: Chest pain, shortness of breath  COMPARISON: Chest exam dated 12/30/2019.  FINDINGS:  There is a mild bilateral interstitial coarsening. No consolidation. No pleural effusion or pneumothorax. The cardiomediastinal silhouette and pulmonary vascularity are within normal limits. The osseous  structures and surrounding soft tissues demonstrate no acute abnormality.      Impression: 1. Mild interstitial coarsening which may reflect a very mild interstitial edema versus interstitial fibrosis given the smoking history. 2. No consolidation or pleural effusion. This report was finalized on 09/03/2021 07:04 by Dr Antione Schmidt, .    CT Angiogram Chest    Result Date: 9/3/2021  Narrative: EXAMINATION:  CT ANGIOGRAM CHEST-  9/3/2021 8:24 AM CDT  HISTORY: PE suspected, low/intermediate prob, positive D-dimer; R07.2-Precordial pain; R06.02-Shortness of breath; I48.91-Unspecified atrial fibrillation; J96.01-Acute respiratory failure with hypoxia.  COMPARISON : No comparison study.  DLP: 238 mGy-cm. Automated dosage control was utilized.  TECHNIQUE: CT angio was performed of the chest with contrast. Coronal, sagittal and 3-D reconstruction were performed.  MEDIASTINUM, HEART AND VASCULAR STRUCTURES: There is atheromatous disease of the thoracic aorta, coronary arteries and great vessels. There is cardiomegaly. There is no CT evidence of pulmonary embolus. There are no pathologically enlarged mediastinal lymph nodes. There are small hilar lymph nodes bilaterally.  LUNGS: There are small bilateral pleural effusions. There is intralobular septal thickening. There is breathing motion artifact. There are mild groundglass areas attenuation. There are few scattered blebs.  UPPER ABDOMEN: There are gallstones in the gallbladder. There is a probable partially imaged liver cyst on the last axial image in the inferior right lobe of the liver. There are probable bilateral renal cysts that are not fully evaluated. There is a 1.8 cm left adrenal nodule with an average Hounsfield unit measurement of 3.  BONES: There are degenerative changes of the visualized spine with syndesmophytes.      Impression: 1. Findings are most consistent with congestive heart failure with cardiomegaly, small bilateral pleural effusions and bilateral  interstitial infiltrates likely representing pulmonary edema. Infectious/inflammatory changes within the lungs is felt to be less likely. 2. Atheromatous disease of the thoracic aorta, great vessels and coronary arteries. 3. No CT evidence of pulmonary embolus. The main pulmonary artery segment is mildly dilated at 3.2 cm. 4. Small hilar lymph nodes are likely engorged/reactive and related to the congestive heart failure. 5. There is a 1.8 cm probable left adrenal adenoma with a Hounsfield unit measurement of 3. There are probable renal cysts bilaterally imaged on this exam. There is a partially imaged probable cyst in the right hepatic lobe inferiorly on the last axial image.  This report was finalized on 09/03/2021 08:59 by Dr. Ubaldo Rubin MD.      ED course:    Medications   sodium chloride 0.9 % flush 10 mL (has no administration in time range)   dilTIAZem (CARDIZEM) 125 mg in 125 mL NS infusion (10 mg/hr Intravenous Rate/Dose Change 9/3/21 0838)   lactated ringers bolus 500 mL (0 mL Intravenous Stopped 9/3/21 0804)   aspirin chewable tablet 324 mg (324 mg Oral Given 9/3/21 0651)   iopamidol (ISOVUE-370) 76 % injection 100 mL (100 mL Intravenous Given 9/3/21 0829)   furosemide (LASIX) injection 40 mg (40 mg Intravenous Given 9/3/21 0914)          Amount and/or complexity of data reviewed:    • Clinical lab tests ordered and reviewed.  • Tests in the radiology section ordered and reviewed.  • Independent visualization of imaging, tracing, or specimen is remarkable for an EKG with atrial fibrillation at the rate of 116, left bundle branch block documented before, no sgarbossa criteria for STEMI.  • Discuss the patient with another provider: Dr. Amado      Risk of significant complications, morbidity, and/or mortality.    •  Presenting problem: high  •  Diagnostic procedures: high  •  Management options: high        MEDICAL DECISION MAKING     Patient presents with cp and soa. Upon arrival to the Emergency  Department the patient is in NAD although he does have slightly increased wob on RA and is in atrial fibrillation which is new for him. IV access is obtained, he is given a gentle bolus of fluids, and he is given aspirin.Patient then desaturates to the high 80s, was placed on oxygen.  ABG obtained shows hypoxia, troponin undetectable, Covid testing is negative, CMP with no gross electrolyte abnormalities, no signs of kidney injury, no elevation of the anion gap, no abnormal LFTs, CBC with leukocytosis.  Patient is evaluated with a CT angiogram the chest which shows findings consistent with CHF with cardiomegaly, bilateral pleural effusions, and bilateral interstitial infiltrates likely representing pulmonary edema.  Patient has been placed on a diltiazem infusion due to his atrial fibrillation with a rapid ventricular response.  He has stable blood pressures.  Due to his findings of CHF he is given 40 of IV Lasix.  I have discussed the case with the hospitalist and the patient has been admitted in stable condition.        Diagnosis:    Final diagnoses:   Precordial pain   Shortness of breath   Atrial fibrillation with rapid ventricular response (CMS/HCC)   Acute respiratory failure with hypoxia (CMS/HCC)   Pleural effusion due to CHF (congestive heart failure) (CMS/HCC)         ED Disposition:     ED Disposition     ED Disposition Condition Comment    Decision to Admit  Level of Care: Telemetry [5]   Diagnosis: Precordial pain [786.51.ICD-9-CM]   Admitting Physician: MADDIE AN [[id number]]   Attending Physician: MADDIE AN [[id number]]   Certification: I Certify That Inpatient Hospital Services Are Medically Necessary For Greater Than 2 Midnights            No follow-up provider specified.       Medication List      No changes were made to your prescriptions during this visit.              Michael Mckeon MD  09/03/21 1019

## 2021-09-03 NOTE — H&P
[facility] Medicine Services  HISTORY AND PHYSICAL    Date of Admission: 9/3/2021  Primary Care Physician: Ki Tamez MD    Subjective     Chief Complaint:   Chest discomfort, shortness of breath    History of Present Illness    This 86-year-old white male presents with a chief complaint of chest discomfort and shortness of breath which started yesterday afternoon.  His symptoms did not improve overnight so he presents to the emergency room today with his son for further evaluation.  Initial assessment in the emergency department revealed the patient's heart rate to be 152.  He was noted to be in atrial fibrillation with rapid ventricular response.  He was placed on a Cardizem drip and his rate is now controlled at 98/min.    Work-up in the emergency department included a CT angiogram showing findings consistent with congestive heart failure with small bilateral pleural effusions and bilateral interstitial infiltrates consistent with pulmonary edema.  Troponin and Covid swab as well as PTT and BNP were within normal limits.  White blood cell count elevated at 19,200 and is likely reactive.  Blood gas shows normal pH with PCO2 45.4 and PO2 63.9.  CMP shows glucose 215 but is otherwise unremarkable.  INR within normal limits with D-dimer elevated at 0.82.    Review of Systems   Constitutional: Negative.    HENT: Negative.    Eyes: Negative.    Respiratory: Positive for chest tightness and shortness of breath.    Cardiovascular: Positive for chest pain.   Gastrointestinal: Negative.    Endocrine: Negative.    Genitourinary: Negative.    Musculoskeletal: Negative.    Skin: Negative.    Allergic/Immunologic: Negative.    Neurological: Negative.    Hematological: Negative.    Psychiatric/Behavioral: Negative.         Otherwise complete ROS reviewed and negative except as mentioned in the HPI.      Past Medical History:     Past Medical History:   Diagnosis Date   • Benign  essential hypertension    • CAD (coronary artery disease)    • CAD in native artery     Story: lexiscan 1/2012 negative for ischemia   • Degeneration of lumbar or lumbosacral intervertebral disc 11/13/2019   • Enlarged prostate    • History of coronary artery stent placement 2/11/2019   • HTN (hypertension) 1/26/2017   • Hyperlipemia, mixed     Lipid panel (10/2014) 143/48/81/189   • Hyperlipidemia    • Hypertension    • Myocardial infarct (CMS/HCC) 1/26/2017   • Myocardial infarction (CMS/HCC)        Past Surgical History:  Past Surgical History:   Procedure Laterality Date   • BACK SURGERY     • CAROTID STENT     • LUMBAR LAMINECTOMY Left 12/31/2019    Procedure: LEFT LUMBAR HEMILAMINECTOMY WITHOUT FUSION AT LUMBAR 3/4 AND 4/5;  Surgeon: Stalin Williamson MD;  [facility];  Service: Neurosurgery   • ROTATOR CUFF REPAIR Bilateral    • SHOULDER SURGERY     • TENNIS ELBOW RELEASE         Family History:   family history includes ALS in his mother; Alzheimer's disease in his maternal grandfather; Heart attack in his father, maternal grandfather, and paternal grandfather.    Social History:    reports that he quit smoking about 31 years ago. His smoking use included cigarettes. He started smoking about 65 years ago. He has quit using smokeless tobacco. He reports that he does not drink alcohol and does not use drugs.    Medications:  Prior to Admission medications    Medication Sig Start Date End Date Taking? Authorizing Provider   amLODIPine (NORVASC) 10 MG tablet Take 1 tablet by mouth Daily. 3/23/20  Yes Briseyda Rios APRN   aspirin 81 MG EC tablet Take 1 tablet by mouth Daily. 1/9/20  Yes Gregg Worrell APRN   lisinopril (PRINIVIL,ZESTRIL) 30 MG tablet Take 1 tablet by mouth Daily. 3/23/20  Yes Briseyda Rios APRN   Misc Natural Products (OSTEO BI-FLEX ADV DOUBLE ST PO) Take  by mouth.   Yes Provider, MD Michelle   Sennosides-Docusate Sodium (STOOL SOFTENER & LAXATIVE PO) Take  by mouth  "2 (Two) Times a Day As Needed.   Yes Provider, MD Michelle   simvastatin (ZOCOR) 40 MG tablet Take 1 tablet by mouth every night at bedtime. 3/23/20  Yes Briseyda Rios APRN       Allergies:  Allergies   Allergen Reactions   • Penicillins Rash       Objective     Vital Signs:   /87   Pulse 99   Temp 98.2 °F (36.8 °C) (Oral)   Resp 22   Ht 167.6 cm (66\")   Wt 74.8 kg (165 lb)   SpO2 94%   BMI 26.63 kg/m²     Physical Exam  Constitutional:       General: He is not in acute distress.     Appearance: Normal appearance. He is normal weight.      Interventions: Nasal cannula in place.   HENT:      Head: Normocephalic and atraumatic.      Right Ear: External ear normal.      Left Ear: External ear normal.      Nose: Nose normal.      Mouth/Throat:      Mouth: Mucous membranes are moist.      Pharynx: Oropharynx is clear.   Eyes:      General: No scleral icterus.     Extraocular Movements: Extraocular movements intact.      Conjunctiva/sclera: Conjunctivae normal.      Pupils: Pupils are equal, round, and reactive to light.   Cardiovascular:      Rate and Rhythm: Normal rate. Rhythm irregularly irregular.      Pulses: Normal pulses.      Heart sounds: Normal heart sounds. No murmur heard.     Pulmonary:      Effort: Pulmonary effort is normal. No respiratory distress.      Breath sounds: Normal breath sounds.   Abdominal:      General: Abdomen is flat. Bowel sounds are normal.      Palpations: Abdomen is soft.      Tenderness: There is no abdominal tenderness.   Musculoskeletal:         General: Normal range of motion.      Right lower leg: Edema (chronic 1/4) present.      Left lower leg: Edema (chronic 1/4) present.   Skin:     General: Skin is warm and dry.      Coloration: Skin is not pale.   Neurological:      General: No focal deficit present.      Mental Status: He is alert and oriented to person, place, and time. Mental status is at baseline.      Cranial Nerves: No cranial nerve deficit. "   Psychiatric:         Mood and Affect: Mood normal.         Judgment: Judgment normal.         Results Reviewed:  Lab Results (last 24 hours)     Procedure Component Value Units Date/Time    Troponin [[id number]]  (Normal) Collected: 09/03/21 0838    Specimen: Blood Updated: 09/03/21 0909     Troponin T <0.010 ng/mL     Narrative:      Troponin T Reference Range:  <= 0.03 ng/mL-   Negative for AMI  >0.03 ng/mL-     Abnormal for myocardial necrosis.  Clinicians would have to utilize clinical acumen, EKG, Troponin and serial changes to determine if it is an Acute Myocardial Infarction or myocardial injury due to an underlying chronic condition.       Results may be falsely decreased if patient taking Biotin.      Karval Draw [[id number]] Collected: 09/03/21 0632    Specimen: Blood from Arm, Left Updated: 09/03/21 0745    Narrative:      The following orders were created for panel order Karval Draw.  Procedure                               Abnormality         Status                     ---------                               -----------         ------                     Green Top (Gel)[[id number]]                                  Final result               Lavender Top[[id number]]                                     Final result               [facility]val Blood Culture Kahlil...[[id number]]                      Final result               Gray Top[[id number]]                                         In process                 Light Blue Top[[id number]]                                   Final result                 Please view results for these tests on the individual orders.    [facility] Blood Culture Bottle Set [[id number]] Collected: 09/03/21 0632    Specimen: Blood from Arm, Left Updated: 09/03/21 0745     Extra Tube Hold for add-ons.     Comment: Auto resulted.       Lavender Top [[id number]] Collected: 09/03/21 0632    Specimen: Blood from Arm, Left Updated: 09/03/21 0745     Extra Tube hold for add-on     Comment: Auto resulted        Green Top (Gel) [[id number]] Collected: 09/03/21 0632    Specimen: Blood from Arm, Left Updated: 09/03/21 0745     Extra Tube Hold for add-ons.     Comment: Auto resulted.       Light Blue Top [[id number]] Collected: 09/03/21 0632    Specimen: Blood from Arm, Left Updated: 09/03/21 0745     Extra Tube hold for add-on     Comment: Auto resulted       COVID-19,[facility] IN-HOUSE,Nasal Swab No Transport Media 3-4 HR TAT - Swab, Nasal Cavity [[id number]]  (Normal) Collected: 09/03/21 0656    Specimen: Swab from Nasal Cavity Updated: 09/03/21 0744     COVID19 Not Detected    Narrative:      Fact sheet for providers: https://www.fda.gov/media/650214/download     Fact sheet for patients: https://www.fda.gov/media/532620/download    Test performed by PCR.    Consider negative results in combination with clinical observations, patient history, and epidemiological information.    Comprehensive Metabolic Panel [[id number]]  (Abnormal) Collected: 09/03/21 0632    Specimen: Blood from Arm, Left Updated: 09/03/21 0712     Glucose 215 mg/dL      BUN 21 mg/dL      Creatinine 0.73 mg/dL      Sodium 142 mmol/L      Potassium 3.7 mmol/L      Chloride 106 mmol/L      CO2 24.0 mmol/L      Calcium 8.9 mg/dL      Total Protein 6.6 g/dL      Albumin 4.70 g/dL      ALT (SGPT) 75 U/L      AST (SGOT) 25 U/L      Alkaline Phosphatase 66 U/L      Total Bilirubin 1.2 mg/dL      eGFR Non African Amer 102 mL/min/1.73      Globulin 1.9 gm/dL      A/G Ratio 2.5 g/dL      BUN/Creatinine Ratio 28.8     Anion Gap 12.0 mmol/L     Narrative:      GFR Normal >60  Chronic Kidney Disease <60  Kidney Failure <15      BNP [[id number]]  (Normal) Collected: 09/03/21 0632    Specimen: Blood from Arm, Left Updated: 09/03/21 0712     proBNP 1,556.0 pg/mL     Narrative:      Among patients with dyspnea, NT-proBNP is highly sensitive for the detection of acute congestive heart failure. In addition NT-proBNP of <300 pg/ml effectively rules out acute congestive heart  failure with 99% negative predictive value.    Results may be falsely decreased if patient taking Biotin.      D-dimer, Quantitative [[id number]]  (Abnormal) Collected: 09/03/21 0632    Specimen: Blood from Arm, Left Updated: 09/03/21 0652     D-Dimer, Quantitative 0.82 mg/L (FEU)     Narrative:      Reference Range is 0-0.50 mg/L FEU. However, results <0.50 mg/L FEU tends to rule out DVT or PE. Results >0.50 mg/L FEU are not useful in predicting absence or presence of DVT or PE.      aPTT [[id number]]  (Normal) Collected: 09/03/21 0632    Specimen: Blood from Arm, Left Updated: 09/03/21 0652     PTT 24.5 seconds     Protime-INR [[id number]]  (Normal) Collected: 09/03/21 0632    Specimen: Blood from Arm, Left Updated: 09/03/21 0652     Protime 12.4 Seconds      INR 1.00    Blood Gas, Arterial - [[id number]]  (Abnormal) Collected: 09/03/21 0645    Specimen: Arterial Blood Updated: 09/03/21 0648     Site Right Radial     Vadim's Test Positive     pH, Arterial 7.376 pH units      pCO2, Arterial 45.4 mm Hg      Comment: 83 Value above reference range        pO2, Arterial 63.9 mm Hg      Comment: 84 Value below reference range        HCO3, Arterial 26.6 mmol/L      Comment: 83 Value above reference range        Base Excess, Arterial 0.8 mmol/L      O2 Saturation, Arterial 91.5 %      Comment: 84 Value below reference range        Temperature 37.0 C      Barometric Pressure for Blood Gas 752 mmHg      Modality Nasal Cannula     Flow Rate 3.0 lpm      Ventilator Mode NA     Collected by [id number]     Comment: Meter: [id number]     :  [id number]        pCO2, Temperature Corrected 45.4 mm Hg      pH, Temp Corrected 7.376 pH Units      pO2, Temperature Corrected 63.9 mm Hg     CBC Auto Differential [[id number]]  (Abnormal) Collected: 09/03/21 0632    Specimen: Blood from Arm, Left Updated: 09/03/21 0644     WBC 19.19 10*3/mm3      RBC 5.76 10*6/mm3      Hemoglobin 17.2 g/dL      Hematocrit 51.2 %      MCV 88.9 fL      MCH  29.9 pg      MCHC 33.6 g/dL      RDW 14.6 %      RDW-SD 46.4 fl      MPV 10.1 fL      Platelets 273 10*3/mm3      Neutrophil % 75.0 %      Lymphocyte % 13.2 %      Monocyte % 9.9 %      Eosinophil % 0.5 %      Basophil % 0.4 %      Immature Grans % 1.0 %      Neutrophils, Absolute 14.39 10*3/mm3      Lymphocytes, Absolute 2.54 10*3/mm3      Monocytes, Absolute 1.90 10*3/mm3      Eosinophils, Absolute 0.09 10*3/mm3      Basophils, Absolute 0.07 10*3/mm3      Immature Grans, Absolute 0.20 10*3/mm3      nRBC 0.0 /100 WBC     Lieberman Top [[id number]] Collected: 09/03/21 0632    Specimen: Blood from Arm, Left Updated: 09/03/21 0638          XR Chest 1 View    Result Date: 9/3/2021  Narrative: Frontal upright radiograph of the chest 9/3/2021 6:33 AM CDT  HISTORY: Chest pain, shortness of breath  COMPARISON: Chest exam dated 12/30/2019.  FINDINGS:  There is a mild bilateral interstitial coarsening. No consolidation. No pleural effusion or pneumothorax. The cardiomediastinal silhouette and pulmonary vascularity are within normal limits. The osseous structures and surrounding soft tissues demonstrate no acute abnormality.      Impression: 1. Mild interstitial coarsening which may reflect a very mild interstitial edema versus interstitial fibrosis given the smoking history. 2. No consolidation or pleural effusion. This report was finalized on 09/03/2021 07:04 by Dr Antione Schmidt, .    CT Angiogram Chest    Result Date: 9/3/2021  Narrative: EXAMINATION:  CT ANGIOGRAM CHEST-  9/3/2021 8:24 AM CDT  HISTORY: PE suspected, low/intermediate prob, positive D-dimer; R07.2-Precordial pain; R06.02-Shortness of breath; I48.91-Unspecified atrial fibrillation; J96.01-Acute respiratory failure with hypoxia.  COMPARISON : No comparison study.  DLP: 238 mGy-cm. Automated dosage control was utilized.  TECHNIQUE: CT angio was performed of the chest with contrast. Coronal, sagittal and 3-D reconstruction were performed.  MEDIASTINUM, HEART AND  VASCULAR STRUCTURES: There is atheromatous disease of the thoracic aorta, coronary arteries and great vessels. There is cardiomegaly. There is no CT evidence of pulmonary embolus. There are no pathologically enlarged mediastinal lymph nodes. There are small hilar lymph nodes bilaterally.  LUNGS: There are small bilateral pleural effusions. There is intralobular septal thickening. There is breathing motion artifact. There are mild groundglass areas attenuation. There are few scattered blebs.  UPPER ABDOMEN: There are gallstones in the gallbladder. There is a probable partially imaged liver cyst on the last axial image in the inferior right lobe of the liver. There are probable bilateral renal cysts that are not fully evaluated. There is a 1.8 cm left adrenal nodule with an average Hounsfield unit measurement of 3.  BONES: There are degenerative changes of the visualized spine with syndesmophytes.      Impression: 1. Findings are most consistent with congestive heart failure with cardiomegaly, small bilateral pleural effusions and bilateral interstitial infiltrates likely representing pulmonary edema. Infectious/inflammatory changes within the lungs is felt to be less likely. 2. Atheromatous disease of the thoracic aorta, great vessels and coronary arteries. 3. No CT evidence of pulmonary embolus. The main pulmonary artery segment is mildly dilated at 3.2 cm. 4. Small hilar lymph nodes are likely engorged/reactive and related to the congestive heart failure. 5. There is a 1.8 cm probable left adrenal adenoma with a Hounsfield unit measurement of 3. There are probable renal cysts bilaterally imaged on this exam. There is a partially imaged probable cyst in the right hepatic lobe inferiorly on the last axial image.  This report was finalized on 09/03/2021 08:59 by Dr. Ubaldo Rubin MD.     I have personally reviewed and interpreted the radiology studies and ECG obtained at time of admission.     Assessment / Plan       Assessment & Plan  Active Hospital Problems    Diagnosis    • **Atrial fibrillation with rapid ventricular response (CMS/HCC)    • Acute pulmonary edema (CMS/HCC) due to atrial fibrillation    • Bilateral lower extremity edema    • CAD (coronary artery disease)    • HTN (hypertension)        PLAN:   Admit to the telemetry unit  Continue diltiazem drip  Immediate release diltiazem 60 mg p.o. every 6 hours  Transition to long-acting diltiazem in a.m. after drip has been weaned  Stat TSH  Repeat Lasix 40 mg IV at 6 PM today  Eliquis 5 mg p.o. twice daily    Code Status:   Full code  Surrogate decision makers the patient's son     I discussed the patient's findings and my recommendations with: The patient and his son who is present in the room    Estimated length of stay: 1 to 2 days    Electronically signed by Beto Amado DO, 09/03/21, 10:10 CDT.

## 2021-09-03 NOTE — CASE MANAGEMENT/SOCIAL WORK
Discharge Planning Assessment  [facility]     Patient Name: Mickey Conde  MRN: [id number]  Today's Date: 9/3/2021    Admit Date: 9/3/2021    Discharge Needs Assessment     Row Name 09/03/21 1055       Living Environment    Lives With  alone    Current Living Arrangements  home/apartment/condo    Primary Care Provided by  self    Provides Primary Care For  no one    Family Caregiver if Needed  child(christopher), adult    Family Caregiver Names  Sons, Aurelio and Harish    Quality of Family Relationships  helpful;involved;supportive    Able to Return to Prior Arrangements  yes       Resource/Environmental Concerns    Resource/Environmental Concerns  none    Transportation Concerns  car, none       Transition Planning    Patient/Family Anticipated Services at Transition  none    Transportation Anticipated  family or friend will provide       Discharge Needs Assessment    Readmission Within the Last 30 Days  no previous admission in last 30 days    Equipment Currently Used at Home  none    Concerns to be Addressed  no discharge needs identified    Anticipated Changes Related to Illness  none    Equipment Needed After Discharge  none    Current Discharge Risk  lives alone    Discharge Coordination/Progress  Lives alone, very independent. No needs identified at this time.        Discharge Plan    No documentation.       Continued Care and Services - Admitted Since 9/3/2021    Coordination has not been started for this encounter.         Demographic Summary    No documentation.       Functional Status    No documentation.       Psychosocial    No documentation.       Abuse/Neglect    No documentation.       Legal    No documentation.       Substance Abuse    No documentation.       Patient Forms    No documentation.           Sandie Mraie LPN

## 2021-09-04 VITALS
BODY MASS INDEX: 27.48 KG/M2 | TEMPERATURE: 98.8 F | DIASTOLIC BLOOD PRESSURE: 78 MMHG | OXYGEN SATURATION: 91 % | WEIGHT: 171 LBS | HEART RATE: 57 BPM | SYSTOLIC BLOOD PRESSURE: 136 MMHG | HEIGHT: 66 IN | RESPIRATION RATE: 18 BRPM

## 2021-09-04 PROBLEM — I48.0 PAROXYSMAL ATRIAL FIBRILLATION WITH RAPID VENTRICULAR RESPONSE (HCC): Status: ACTIVE | Noted: 2021-09-04

## 2021-09-04 LAB
ANION GAP SERPL CALCULATED.3IONS-SCNC: 11 MMOL/L (ref 5–15)
BASOPHILS # BLD AUTO: 0.05 10*3/MM3 (ref 0–0.2)
BASOPHILS NFR BLD AUTO: 0.4 % (ref 0–1.5)
BUN SERPL-MCNC: 18 MG/DL (ref 8–23)
BUN/CREAT SERPL: 23.1 (ref 7–25)
CALCIUM SPEC-SCNC: 9 MG/DL (ref 8.6–10.5)
CHLORIDE SERPL-SCNC: 101 MMOL/L (ref 98–107)
CO2 SERPL-SCNC: 30 MMOL/L (ref 22–29)
CREAT SERPL-MCNC: 0.78 MG/DL (ref 0.76–1.27)
DEPRECATED RDW RBC AUTO: 45.6 FL (ref 37–54)
EOSINOPHIL # BLD AUTO: 0.06 10*3/MM3 (ref 0–0.4)
EOSINOPHIL NFR BLD AUTO: 0.5 % (ref 0.3–6.2)
ERYTHROCYTE [DISTWIDTH] IN BLOOD BY AUTOMATED COUNT: 14.3 % (ref 12.3–15.4)
GFR SERPL CREATININE-BSD FRML MDRD: 94 ML/MIN/1.73
GLUCOSE SERPL-MCNC: 125 MG/DL (ref 65–99)
HCT VFR BLD AUTO: 48 % (ref 37.5–51)
HGB BLD-MCNC: 15.7 G/DL (ref 13–17.7)
IMM GRANULOCYTES # BLD AUTO: 0.09 10*3/MM3 (ref 0–0.05)
IMM GRANULOCYTES NFR BLD AUTO: 0.7 % (ref 0–0.5)
LYMPHOCYTES # BLD AUTO: 1.55 10*3/MM3 (ref 0.7–3.1)
LYMPHOCYTES NFR BLD AUTO: 12.4 % (ref 19.6–45.3)
MCH RBC QN AUTO: 29.1 PG (ref 26.6–33)
MCHC RBC AUTO-ENTMCNC: 32.7 G/DL (ref 31.5–35.7)
MCV RBC AUTO: 89.1 FL (ref 79–97)
MONOCYTES # BLD AUTO: 1.41 10*3/MM3 (ref 0.1–0.9)
MONOCYTES NFR BLD AUTO: 11.3 % (ref 5–12)
NEUTROPHILS NFR BLD AUTO: 74.7 % (ref 42.7–76)
NEUTROPHILS NFR BLD AUTO: 9.37 10*3/MM3 (ref 1.7–7)
NRBC BLD AUTO-RTO: 0 /100 WBC (ref 0–0.2)
PLATELET # BLD AUTO: 257 10*3/MM3 (ref 140–450)
PMV BLD AUTO: 10.5 FL (ref 6–12)
POTASSIUM SERPL-SCNC: 3.6 MMOL/L (ref 3.5–5.2)
RBC # BLD AUTO: 5.39 10*6/MM3 (ref 4.14–5.8)
SODIUM SERPL-SCNC: 142 MMOL/L (ref 136–145)
WBC # BLD AUTO: 12.53 10*3/MM3 (ref 3.4–10.8)

## 2021-09-04 PROCEDURE — 80048 BASIC METABOLIC PNL TOTAL CA: CPT | Performed by: FAMILY MEDICINE

## 2021-09-04 PROCEDURE — 85025 COMPLETE CBC W/AUTO DIFF WBC: CPT | Performed by: FAMILY MEDICINE

## 2021-09-04 RX ORDER — DILTIAZEM HYDROCHLORIDE 240 MG/1
240 CAPSULE, COATED, EXTENDED RELEASE ORAL DAILY
Qty: 30 CAPSULE | Refills: 2 | Status: SHIPPED | OUTPATIENT
Start: 2021-09-04 | End: 2021-09-30

## 2021-09-04 RX ADMIN — DOCUSATE SODIUM 50 MG AND SENNOSIDES 8.6 MG 1 TABLET: 8.6; 5 TABLET, FILM COATED ORAL at 08:27

## 2021-09-04 RX ADMIN — LISINOPRIL 30 MG: 20 TABLET ORAL at 08:28

## 2021-09-04 RX ADMIN — APIXABAN 5 MG: 5 TABLET, FILM COATED ORAL at 08:27

## 2021-09-04 RX ADMIN — DILTIAZEM HYDROCHLORIDE 360 MG: 240 CAPSULE, EXTENDED RELEASE ORAL at 12:02

## 2021-09-04 RX ADMIN — ATORVASTATIN CALCIUM 20 MG: 10 TABLET, FILM COATED ORAL at 08:27

## 2021-09-04 RX ADMIN — ASPIRIN 81 MG: 81 TABLET, COATED ORAL at 08:27

## 2021-09-04 RX ADMIN — SODIUM CHLORIDE, PRESERVATIVE FREE 10 ML: 5 INJECTION INTRAVENOUS at 08:28

## 2021-09-04 RX ADMIN — DILTIAZEM HYDROCHLORIDE 60 MG: 60 TABLET, FILM COATED ORAL at 05:11

## 2021-09-04 NOTE — PLAN OF CARE
Goal Outcome Evaluation:  Plan of Care Reviewed With: patient        Progress: improving  Outcome Summary: No C/O SOB or CP, Cardizem changed to CD after NSR for 12 hours, hoping to dc this afternoon or in the morning

## 2021-09-04 NOTE — DISCHARGE SUMMARY
[facility] Medicine Services  DISCHARGE SUMMARY       Date of Admission: 9/3/2021  Date of Discharge:  9/4/2021  Primary Care Physician: Ki Tamez MD    Discharge Diagnoses:  Active Hospital Problems    Diagnosis    • **Paroxysmal atrial fibrillation with rapid ventricular response (CMS/HCC)    • Acute pulmonary edema (CMS/HCC) due to atrial fibrillation    • Bilateral lower extremity edema    • CAD (coronary artery disease)    • HTN (hypertension)          Presenting Problem/History of Present Illness:  Precordial pain [R07.2]     Chief Complaint on Day of Discharge:   No complaints    History of Present Illness on Day of Discharge:   The patient has converted to normal sinus rhythm.  Heart rate is 78 at the time of my evaluation.  He will continue on Cardizem .  Amlodipine will be discontinued.  Is doing well presently.  He will require anticoagulation with NOAC at the time of discharge.  He is to follow-up with his primary care physician next week.    Hospital Course   This 86-year-old white male presents with a chief complaint of chest discomfort and shortness of breath which started yesterday afternoon.  His symptoms did not improve overnight so he presents to the emergency room today with his son for further evaluation.  Initial assessment in the emergency department revealed the patient's heart rate to be 152.  He was noted to be in atrial fibrillation with rapid ventricular response.  He was placed on a Cardizem drip and his rate is now controlled at 98/min.     Work-up in the emergency department included a CT angiogram showing findings consistent with congestive heart failure with small bilateral pleural effusions and bilateral interstitial infiltrates consistent with pulmonary edema.  Troponin and Covid swab as well as PTT and BNP were within normal limits.  White blood cell count elevated at 19,200 and is likely reactive.  Blood gas shows normal pH with  PCO2 45.4 and PO2 63.9.  CMP shows glucose 215 but is otherwise unremarkable.  INR within normal limits with D-dimer elevated at 0.82.  PLAN:   Admit to the telemetry unit  Continue diltiazem drip  Immediate release diltiazem 60 mg p.o. every 6 hours  Transition to long-acting diltiazem in a.m. after drip has been weaned  Stat TSH  Repeat Lasix 40 mg IV at 6 PM today  Eliquis 5 mg p.o. twice daily      Pertinent Test Results:   Lab Results (last 7 days)     Procedure Component Value Units Date/Time    Basic Metabolic Panel [[id number]]  (Abnormal) Collected: 09/04/21 0359    Specimen: Blood Updated: 09/04/21 0529     Glucose 125 mg/dL      BUN 18 mg/dL      Creatinine 0.78 mg/dL      Sodium 142 mmol/L      Potassium 3.6 mmol/L      Chloride 101 mmol/L      CO2 30.0 mmol/L      Calcium 9.0 mg/dL      eGFR Non African Amer 94 mL/min/1.73      BUN/Creatinine Ratio 23.1     Anion Gap 11.0 mmol/L     Narrative:      GFR Normal >60  Chronic Kidney Disease <60  Kidney Failure <15      CBC Auto Differential [[id number]]  (Abnormal) Collected: 09/04/21 0359    Specimen: Blood Updated: 09/04/21 0510     WBC 12.53 10*3/mm3      RBC 5.39 10*6/mm3      Hemoglobin 15.7 g/dL      Hematocrit 48.0 %      MCV 89.1 fL      MCH 29.1 pg      MCHC 32.7 g/dL      RDW 14.3 %      RDW-SD 45.6 fl      MPV 10.5 fL      Platelets 257 10*3/mm3      Neutrophil % 74.7 %      Lymphocyte % 12.4 %      Monocyte % 11.3 %      Eosinophil % 0.5 %      Basophil % 0.4 %      Immature Grans % 0.7 %      Neutrophils, Absolute 9.37 10*3/mm3      Lymphocytes, Absolute 1.55 10*3/mm3      Monocytes, Absolute 1.41 10*3/mm3      Eosinophils, Absolute 0.06 10*3/mm3      Basophils, Absolute 0.05 10*3/mm3      Immature Grans, Absolute 0.09 10*3/mm3      nRBC 0.0 /100 WBC     TSH [[id number]]  (Normal) Collected: 09/03/21 0838    Specimen: Blood Updated: 09/03/21 1912     TSH 0.720 uIU/mL     [place] Draw [[id number]] Collected: 09/03/21 0632    Specimen: Blood from  Arm, Left Updated: 09/03/21 1046    Narrative:      The following orders were created for panel order [place] Draw.  Procedure                               Abnormality         Status                     ---------                               -----------         ------                     Green Top (Gel)[[id number]]                                  Final result               Lavender Top[[id number]]                                     Final result               [place] Blood Culture Kahlil...[[id number]]                      Final result               Gray Top[[id number]]                                         Final result               Light Blue Top[[id number]]                                   Final result                 Please view results for these tests on the individual orders.    [place] Top [[id number]] Collected: 09/03/21 0632    Specimen: Blood from Arm, Left Updated: 09/03/21 1046     Extra Tube Hold for add-ons.     Comment: Auto resulted.       Troponin [[id number]]  (Normal) Collected: 09/03/21 0838    Specimen: Blood Updated: 09/03/21 0909     Troponin T <0.010 ng/mL     Narrative:      Troponin T Reference Range:  <= 0.03 ng/mL-   Negative for AMI  >0.03 ng/mL-     Abnormal for myocardial necrosis.  Clinicians would have to utilize clinical acumen, EKG, Troponin and serial changes to determine if it is an Acute Myocardial Infarction or myocardial injury due to an underlying chronic condition.       Results may be falsely decreased if patient taking Biotin.      [place] Blood Culture Bottle Set [[id number]] Collected: 09/03/21 0632    Specimen: Blood from Arm, Left Updated: 09/03/21 0745     Extra Tube Hold for add-ons.     Comment: Auto resulted.       Lavender Top [[id number]] Collected: 09/03/21 0632    Specimen: Blood from Arm, Left Updated: 09/03/21 0745     Extra Tube hold for add-on     Comment: Auto resulted       Green Top (Gel) [[id number]] Collected: 09/03/21 0632    Specimen: Blood from Arm, Left  Updated: 09/03/21 0745     Extra Tube Hold for add-ons.     Comment: Auto resulted.       Light Blue Top [[id number]] Collected: 09/03/21 0632    Specimen: Blood from Arm, Left Updated: 09/03/21 0745     Extra Tube hold for add-on     Comment: Auto resulted       COVID-19,[facility] IN-HOUSE,Nasal Swab No Transport Media 3-4 HR TAT - Swab, Nasal Cavity [[id number]]  (Normal) Collected: 09/03/21 0656    Specimen: Swab from Nasal Cavity Updated: 09/03/21 0744     COVID19 Not Detected    Narrative:      Fact sheet for providers: https://www.fda.gov/media/680471/download     Fact sheet for patients: https://www.fda.gov/media/619609/download    Test performed by PCR.    Consider negative results in combination with clinical observations, patient history, and epidemiological information.    Comprehensive Metabolic Panel [[id number]]  (Abnormal) Collected: 09/03/21 0632    Specimen: Blood from Arm, Left Updated: 09/03/21 0712     Glucose 215 mg/dL      BUN 21 mg/dL      Creatinine 0.73 mg/dL      Sodium 142 mmol/L      Potassium 3.7 mmol/L      Chloride 106 mmol/L      CO2 24.0 mmol/L      Calcium 8.9 mg/dL      Total Protein 6.6 g/dL      Albumin 4.70 g/dL      ALT (SGPT) 75 U/L      AST (SGOT) 25 U/L      Alkaline Phosphatase 66 U/L      Total Bilirubin 1.2 mg/dL      eGFR Non African Amer 102 mL/min/1.73      Globulin 1.9 gm/dL      A/G Ratio 2.5 g/dL      BUN/Creatinine Ratio 28.8     Anion Gap 12.0 mmol/L     Narrative:      GFR Normal >60  Chronic Kidney Disease <60  Kidney Failure <15      BNP [[id number]]  (Normal) Collected: 09/03/21 0632    Specimen: Blood from Arm, Left Updated: 09/03/21 0712     proBNP 1,556.0 pg/mL     Narrative:      Among patients with dyspnea, NT-proBNP is highly sensitive for the detection of acute congestive heart failure. In addition NT-proBNP of <300 pg/ml effectively rules out acute congestive heart failure with 99% negative predictive value.    Results may be falsely decreased if patient  taking Biotin.      D-dimer, Quantitative [[id number]]  (Abnormal) Collected: 09/03/21 0632    Specimen: Blood from Arm, Left Updated: 09/03/21 0652     D-Dimer, Quantitative 0.82 mg/L (FEU)     Narrative:      Reference Range is 0-0.50 mg/L FEU. However, results <0.50 mg/L FEU tends to rule out DVT or PE. Results >0.50 mg/L FEU are not useful in predicting absence or presence of DVT or PE.      aPTT [[id number]]  (Normal) Collected: 09/03/21 0632    Specimen: Blood from Arm, Left Updated: 09/03/21 0652     PTT 24.5 seconds     Protime-INR [[id number]]  (Normal) Collected: 09/03/21 0632    Specimen: Blood from Arm, Left Updated: 09/03/21 0652     Protime 12.4 Seconds      INR 1.00    Blood Gas, Arterial - [[id number]]  (Abnormal) Collected: 09/03/21 0645    Specimen: Arterial Blood Updated: 09/03/21 0648     Site Right Radial     Vadim's Test Positive     pH, Arterial 7.376 pH units      pCO2, Arterial 45.4 mm Hg      Comment: 83 Value above reference range        pO2, Arterial 63.9 mm Hg      Comment: 84 Value below reference range        HCO3, Arterial 26.6 mmol/L      Comment: 83 Value above reference range        Base Excess, Arterial 0.8 mmol/L      O2 Saturation, Arterial 91.5 %      Comment: 84 Value below reference range        Temperature 37.0 C      Barometric Pressure for Blood Gas 752 mmHg      Modality Nasal Cannula     Flow Rate 3.0 lpm      Ventilator Mode NA     Collected by [id number]     Comment: Meter: [id number]     :  [id number]        pCO2, Temperature Corrected 45.4 mm Hg      pH, Temp Corrected 7.376 pH Units      pO2, Temperature Corrected 63.9 mm Hg     CBC Auto Differential [[id number]]  (Abnormal) Collected: 09/03/21 0632    Specimen: Blood from Arm, Left Updated: 09/03/21 0644     WBC 19.19 10*3/mm3      RBC 5.76 10*6/mm3      Hemoglobin 17.2 g/dL      Hematocrit 51.2 %      MCV 88.9 fL      MCH 29.9 pg      MCHC 33.6 g/dL      RDW 14.6 %      RDW-SD 46.4 fl      MPV 10.1 fL       Platelets 273 10*3/mm3      Neutrophil % 75.0 %      Lymphocyte % 13.2 %      Monocyte % 9.9 %      Eosinophil % 0.5 %      Basophil % 0.4 %      Immature Grans % 1.0 %      Neutrophils, Absolute 14.39 10*3/mm3      Lymphocytes, Absolute 2.54 10*3/mm3      Monocytes, Absolute 1.90 10*3/mm3      Eosinophils, Absolute 0.09 10*3/mm3      Basophils, Absolute 0.07 10*3/mm3      Immature Grans, Absolute 0.20 10*3/mm3      nRBC 0.0 /100 WBC         Imaging Results (Last 7 Days)     Procedure Component Value Units Date/Time    CT Angiogram Chest [[id number]] Collected: 09/03/21 0848     Updated: 09/03/21 0902    Narrative:      EXAMINATION:  CT ANGIOGRAM CHEST-  9/3/2021 8:24 AM CDT     HISTORY: PE suspected, low/intermediate prob, positive D-dimer;  R07.2-Precordial pain; R06.02-Shortness of breath; I48.91-Unspecified  atrial fibrillation; J96.01-Acute respiratory failure with hypoxia.     COMPARISON : No comparison study.     DLP: 238 mGy-cm. Automated dosage control was utilized.     TECHNIQUE: CT angio was performed of the chest with contrast. Coronal,  sagittal and 3-D reconstruction were performed.     MEDIASTINUM, HEART AND VASCULAR STRUCTURES: There is atheromatous  disease of the thoracic aorta, coronary arteries and great vessels.  There is cardiomegaly. There is no CT evidence of pulmonary embolus.  There are no pathologically enlarged mediastinal lymph nodes. There are  small hilar lymph nodes bilaterally.     LUNGS: There are small bilateral pleural effusions. There is  intralobular septal thickening. There is breathing motion artifact.  There are mild groundglass areas attenuation. There are few scattered  blebs.     UPPER ABDOMEN: There are gallstones in the gallbladder. There is a  probable partially imaged liver cyst on the last axial image in the  inferior right lobe of the liver. There are probable bilateral renal  cysts that are not fully evaluated. There is a 1.8 cm left adrenal  nodule with an average  Hounsfield unit measurement of 3.     BONES: There are degenerative changes of the visualized spine with  syndesmophytes.       Impression:      1. Findings are most consistent with congestive heart failure with  cardiomegaly, small bilateral pleural effusions and bilateral  interstitial infiltrates likely representing pulmonary edema.  Infectious/inflammatory changes within the lungs is felt to be less  likely.  2. Atheromatous disease of the thoracic aorta, great vessels and  coronary arteries.  3. No CT evidence of pulmonary embolus. The main pulmonary artery  segment is mildly dilated at 3.2 cm.  4. Small hilar lymph nodes are likely engorged/reactive and related to  the congestive heart failure.  5. There is a 1.8 cm probable left adrenal adenoma with a Hounsfield  unit measurement of 3. There are probable renal cysts bilaterally imaged  on this exam. There is a partially imaged probable cyst in the right  hepatic lobe inferiorly on the last axial image.    This report was finalized on 09/03/2021 08:59 by Dr. Ubaldo Rubin MD.    XR Chest 1 View [[id number]] Collected: 09/03/21 0701     Updated: 09/03/21 0707    Narrative:      Frontal upright radiograph of the chest 9/3/2021 6:33 AM CDT     HISTORY: Chest pain, shortness of breath     COMPARISON: Chest exam dated 12/30/2019.     FINDINGS:      There is a mild bilateral interstitial coarsening. No consolidation. No  pleural effusion or pneumothorax. The cardiomediastinal silhouette and  pulmonary vascularity are within normal limits. The osseous structures  and surrounding soft tissues demonstrate no acute abnormality.       Impression:      1. Mild interstitial coarsening which may reflect a very mild  interstitial edema versus interstitial fibrosis given the smoking  history.  2. No consolidation or pleural effusion.  This report was finalized on 09/03/2021 07:04 by Dr Antione Schmidt, .              Condition on Discharge:    Stable    Physical Exam on  "Discharge:  /78 (BP Location: Right arm, Patient Position: Lying)   Pulse 57   Temp 98.8 °F (37.1 °C) (Oral)   Resp 18   Ht 167.6 cm (66\")   Wt 77.6 kg (171 lb)   SpO2 91%   BMI 27.60 kg/m²   Physical Exam     Constitutional:       General: He is not in acute distress.     Appearance: Normal appearance. He is normal weight.      Interventions: Nasal cannula in place.   HENT:      Head: Normocephalic and atraumatic.      Right Ear: External ear normal.      Left Ear: External ear normal.      Nose: Nose normal.      Mouth/Throat:      Mouth: Mucous membranes are moist.      Pharynx: Oropharynx is clear.   Eyes:      General: No scleral icterus.     Extraocular Movements: Extraocular movements intact.      Conjunctiva/sclera: Conjunctivae normal.      Pupils: Pupils are equal, round, and reactive to light.   Cardiovascular:      Rate and Rhythm: Normal rate.   Regular rhythm.     Pulses: Normal pulses.      Heart sounds: Normal heart sounds. No murmur heard.   Pulmonary:      Effort: Pulmonary effort is normal. No respiratory distress.      Breath sounds: Normal breath sounds.   Abdominal:      General: Abdomen is flat. Bowel sounds are normal.      Palpations: Abdomen is soft.      Tenderness: There is no abdominal tenderness.   Musculoskeletal:         General: Normal range of motion.      Right lower leg: Edema (chronic 1/4) present.      Left lower leg: Edema (chronic 1/4) present.   Skin:     General: Skin is warm and dry.      Coloration: Skin is not pale.   Neurological:      General: No focal deficit present.      Mental Status: He is alert and oriented to person, place, and time. Mental status is at baseline.      Cranial Nerves: No cranial nerve deficit.   Psychiatric:         Mood and Affect: Mood normal.         Judgment: Judgment normal.     Discharge Disposition:  Home or Self Care    Discharge Medications:     Discharge Medications      New Medications      Instructions Start Date "   apixaban 5 MG tablet tablet  Commonly known as: ELIQUIS   5 mg, Oral, Every 12 Hours Scheduled         Continue These Medications      Instructions Start Date   aspirin 81 MG EC tablet   81 mg, Oral, Daily      lisinopril 30 MG tablet  Commonly known as: PRINIVIL,ZESTRIL   30 mg, Oral, Daily      OSTEO BI-FLEX ADV DOUBLE ST PO   Oral      simvastatin 40 MG tablet  Commonly known as: ZOCOR   40 mg, Oral, Every Night at Bedtime      STOOL SOFTENER & LAXATIVE PO   Oral, 2 Times Daily PRN         Stop These Medications    amLODIPine 10 MG tablet  Commonly known as: NORVASC     diclofenac sodium 100 MG 24 hr tablet  Commonly known as: VOTAREN XR            Discharge Diet:   Diet Instructions     Diet: Regular; Thin      Discharge Diet: Regular    Fluid Consistency: Thin          Discharge Care Plan / Instructions:   Discharge home    Activity at Discharge:   Activity Instructions     Activity as Tolerated            Follow-up Appointments:  Follow-up with primary care physician next week       Electronically signed by Beto Amado DO, 09/04/21, 16:18 CDT.    Time: Discharge Less than 30 min    Part of this note may be an electronic transcription/translation of spoken language to printed text using the Dragon Dictation system.

## 2021-09-04 NOTE — PLAN OF CARE
Goal Outcome Evaluation:  Plan of Care Reviewed With: patient        Progress: improving  Outcome Summary: O2 at 4l/m up as connie  .voiding per urina , no c/o pain  cont to monitor pt s/sb 56-86

## 2021-09-05 ENCOUNTER — NURSE TRIAGE (OUTPATIENT)
Dept: CALL CENTER | Facility: HOSPITAL | Age: 86
End: 2021-09-05

## 2021-09-05 NOTE — TELEPHONE ENCOUNTER
Caller , Sandie who states she will look and see if there is anything we can do to help. Will call back. Sandie states she found a discount card and pt is to pick it up at [facility] waiting room desk.     Reason for Disposition  • [1] Caller has URGENT medicine question about med that PCP or specialist prescribed AND [2] triager unable to answer question    Additional Information  • Negative: [1] Intentional drug overdose AND [2] suicidal thoughts or ideas  • Negative: Drug overdose and triager unable to answer question  • Negative: Caller requesting information unrelated to medicine  • Negative: Caller requesting information about COVID-19 Vaccine  • Negative: Caller requesting information about Emergency Contraception  • Negative: Caller requesting information about Combined Birth Control Pills  • Negative: Caller requesting information about Progestin Birth Control Pills  • Negative: Caller requesting information about Post-Op pain or medicines  • Negative: Caller requesting a prescription antibiotic (such as Penicillin) for Strep throat and has a positive culture result  • Negative: Caller requesting a prescription anti-viral med (such as Tamiflu) and has influenza (flu)  symptoms  • Negative: Immunization reaction suspected  • Negative: Rash while taking a medicine or within 3 days of stopping it  • Negative: [1] Asthma and [2] having symptoms of asthma (cough, wheezing, etc.)  • Negative: [1] Symptom of illness (e.g., headache, abdominal pain, earache, vomiting) AND [2] more than mild  • Negative: Breastfeeding questions about mother's medicines and diet  • Negative: MORE THAN A DOUBLE DOSE of a prescription or over-the-counter (OTC) drug  • Negative: [1] DOUBLE DOSE (an extra dose or lesser amount) of prescription drug AND [2] any symptoms (e.g., dizziness, nausea, pain, sleepiness)  • Negative: [1] DOUBLE DOSE (an extra dose or lesser amount) of over-the-counter (OTC) drug AND [2] any symptoms  "(e.g., dizziness, nausea, pain, sleepiness)  • Negative: Took another person's prescription drug  • Negative: [1] DOUBLE DOSE (an extra dose or lesser amount) of prescription drug AND [2] NO symptoms (Exception: a double dose of antibiotics)  • Negative: Diabetes drug error or overdose (e.g., took wrong type of insulin or took extra dose)  • Negative: [1] Prescription refill request for ESSENTIAL medicine (i.e., likelihood of harm to patient if not taken) AND [2] triager unable to refill per department policy  • Negative: [1] Prescription not at pharmacy AND [2] was prescribed by PCP recently (Exception: triager has access to EMR and prescription is recorded there. Go to Home Care and confirm for pharmacy.)  • Negative: [1] Pharmacy calling with prescription question AND [2] triager unable to answer question    Answer Assessment - Initial Assessment Questions  1. NAME of MEDICATION: \"What medicine are you calling about?\"      Eliquis  2. QUESTION: \"What is your question?\" (e.g., medication refill, side effect)      states his Eliquis Prescription is $490 dollars and he cannot afford it  3. PRESCRIBING HCP: \"Who prescribed it?\" Reason: if prescribed by specialist, call should be referred to that group.      Discharging MD  4. SYMPTOMS: \"Do you have any symptoms?\"      na  5. SEVERITY: If symptoms are present, ask \"Are they mild, moderate or severe?\"      na  6. PREGNANCY:  \"Is there any chance that you are pregnant?\" \"When was your last menstrual period?\"      na    Protocols used: MEDICATION QUESTION CALL-ADULT-      "

## 2021-09-30 ENCOUNTER — OFFICE VISIT (OUTPATIENT)
Dept: CARDIOLOGY | Facility: CLINIC | Age: 86
End: 2021-09-30

## 2021-09-30 VITALS
HEIGHT: 67 IN | WEIGHT: 171 LBS | DIASTOLIC BLOOD PRESSURE: 60 MMHG | OXYGEN SATURATION: 97 % | SYSTOLIC BLOOD PRESSURE: 111 MMHG | HEART RATE: 59 BPM | BODY MASS INDEX: 26.84 KG/M2

## 2021-09-30 DIAGNOSIS — Z79.01 ANTICOAGULATED: ICD-10-CM

## 2021-09-30 DIAGNOSIS — I48.0 PAROXYSMAL ATRIAL FIBRILLATION (HCC): ICD-10-CM

## 2021-09-30 DIAGNOSIS — I10 ESSENTIAL HYPERTENSION: ICD-10-CM

## 2021-09-30 DIAGNOSIS — E78.2 MIXED HYPERLIPIDEMIA: ICD-10-CM

## 2021-09-30 DIAGNOSIS — I25.10 CORONARY ARTERY DISEASE INVOLVING NATIVE CORONARY ARTERY OF NATIVE HEART WITHOUT ANGINA PECTORIS: Primary | ICD-10-CM

## 2021-09-30 PROCEDURE — 99214 OFFICE O/P EST MOD 30 MIN: CPT | Performed by: NURSE PRACTITIONER

## 2021-09-30 PROCEDURE — 93000 ELECTROCARDIOGRAM COMPLETE: CPT | Performed by: NURSE PRACTITIONER

## 2021-09-30 RX ORDER — ACETAMINOPHEN 325 MG/1
650 TABLET ORAL EVERY 6 HOURS PRN
COMMUNITY

## 2021-09-30 RX ORDER — AMLODIPINE BESYLATE 10 MG/1
10 TABLET ORAL DAILY
COMMUNITY
End: 2022-01-01
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
## 2022-01-01 ENCOUNTER — APPOINTMENT (OUTPATIENT)
Dept: GENERAL RADIOLOGY | Facility: HOSPITAL | Age: 87
End: 2022-01-01

## 2022-01-01 ENCOUNTER — READMISSION MANAGEMENT (OUTPATIENT)
Dept: CALL CENTER | Facility: HOSPITAL | Age: 87
End: 2022-01-01

## 2022-01-01 ENCOUNTER — OFFICE VISIT (OUTPATIENT)
Dept: CARDIOLOGY | Facility: CLINIC | Age: 87
End: 2022-01-01

## 2022-01-01 ENCOUNTER — APPOINTMENT (OUTPATIENT)
Dept: CT IMAGING | Facility: HOSPITAL | Age: 87
End: 2022-01-01

## 2022-01-01 ENCOUNTER — APPOINTMENT (OUTPATIENT)
Dept: CARDIOLOGY | Facility: HOSPITAL | Age: 87
End: 2022-01-01

## 2022-01-01 ENCOUNTER — ANESTHESIA (OUTPATIENT)
Dept: GASTROENTEROLOGY | Facility: HOSPITAL | Age: 87
End: 2022-01-01

## 2022-01-01 ENCOUNTER — ANESTHESIA EVENT (OUTPATIENT)
Dept: GASTROENTEROLOGY | Facility: HOSPITAL | Age: 87
End: 2022-01-01

## 2022-01-01 ENCOUNTER — HOSPITAL ENCOUNTER (INPATIENT)
Facility: HOSPITAL | Age: 87
LOS: 7 days | Discharge: HOME | End: 2022-09-21
Attending: INTERNAL MEDICINE | Admitting: INTERNAL MEDICINE

## 2022-01-01 ENCOUNTER — APPOINTMENT (OUTPATIENT)
Dept: MRI IMAGING | Facility: HOSPITAL | Age: 87
End: 2022-01-01

## 2022-01-01 ENCOUNTER — HOSPITAL ENCOUNTER (OUTPATIENT)
Dept: MRI IMAGING | Facility: HOSPITAL | Age: 87
Discharge: HOME | End: 2022-04-28
Admitting: NURSE PRACTITIONER

## 2022-01-01 ENCOUNTER — APPOINTMENT (OUTPATIENT)
Dept: ULTRASOUND IMAGING | Facility: HOSPITAL | Age: 87
End: 2022-01-01

## 2022-01-01 ENCOUNTER — HOSPITAL ENCOUNTER (OUTPATIENT)
Dept: CARDIOLOGY | Facility: HOSPITAL | Age: 87
Discharge: HOME | End: 2022-04-11
Admitting: INTERNAL MEDICINE

## 2022-01-01 ENCOUNTER — TELEPHONE (OUTPATIENT)
Dept: CARDIOLOGY | Facility: CLINIC | Age: 87
End: 2022-01-01

## 2022-01-01 VITALS
SYSTOLIC BLOOD PRESSURE: 128 MMHG | HEART RATE: 53 BPM | OXYGEN SATURATION: 98 % | BODY MASS INDEX: 30.91 KG/M2 | DIASTOLIC BLOOD PRESSURE: 80 MMHG | HEIGHT: 62 IN | WEIGHT: 168 LBS

## 2022-01-01 VITALS
WEIGHT: 174.4 LBS | RESPIRATION RATE: 17 BRPM | HEART RATE: 58 BPM | SYSTOLIC BLOOD PRESSURE: 133 MMHG | TEMPERATURE: 98.5 F | HEIGHT: 67 IN | BODY MASS INDEX: 27.37 KG/M2 | DIASTOLIC BLOOD PRESSURE: 41 MMHG | OXYGEN SATURATION: 94 %

## 2022-01-01 VITALS
DIASTOLIC BLOOD PRESSURE: 70 MMHG | BODY MASS INDEX: 27.62 KG/M2 | WEIGHT: 176 LBS | HEIGHT: 67 IN | SYSTOLIC BLOOD PRESSURE: 144 MMHG

## 2022-01-01 VITALS
OXYGEN SATURATION: 99 % | HEIGHT: 67 IN | HEART RATE: 51 BPM | BODY MASS INDEX: 27.62 KG/M2 | SYSTOLIC BLOOD PRESSURE: 124 MMHG | DIASTOLIC BLOOD PRESSURE: 66 MMHG | WEIGHT: 176 LBS

## 2022-01-01 DIAGNOSIS — D62 ACUTE BLOOD LOSS ANEMIA: ICD-10-CM

## 2022-01-01 DIAGNOSIS — R60.0 BILATERAL LOWER EXTREMITY EDEMA: ICD-10-CM

## 2022-01-01 DIAGNOSIS — I48.0 PAROXYSMAL ATRIAL FIBRILLATION: ICD-10-CM

## 2022-01-01 DIAGNOSIS — E78.2 MIXED HYPERLIPIDEMIA: ICD-10-CM

## 2022-01-01 DIAGNOSIS — N28.9 RENAL LESION: ICD-10-CM

## 2022-01-01 DIAGNOSIS — I10 PRIMARY HYPERTENSION: ICD-10-CM

## 2022-01-01 DIAGNOSIS — I42.9 CARDIOMYOPATHY, UNSPECIFIED TYPE: Primary | ICD-10-CM

## 2022-01-01 DIAGNOSIS — E27.8 ADRENAL NODULE: ICD-10-CM

## 2022-01-01 DIAGNOSIS — I48.0 PAROXYSMAL ATRIAL FIBRILLATION: Primary | ICD-10-CM

## 2022-01-01 DIAGNOSIS — R93.5 ABNORMAL CT OF THE ABDOMEN: ICD-10-CM

## 2022-01-01 DIAGNOSIS — I25.10 CORONARY ARTERY DISEASE INVOLVING NATIVE CORONARY ARTERY OF NATIVE HEART WITHOUT ANGINA PECTORIS: Primary | ICD-10-CM

## 2022-01-01 DIAGNOSIS — K92.2 UPPER GI HEMORRHAGE: Primary | ICD-10-CM

## 2022-01-01 DIAGNOSIS — I25.10 CORONARY ARTERY DISEASE INVOLVING NATIVE CORONARY ARTERY OF NATIVE HEART WITHOUT ANGINA PECTORIS: ICD-10-CM

## 2022-01-01 DIAGNOSIS — I67.1 SACCULAR ANEURYSM: ICD-10-CM

## 2022-01-01 DIAGNOSIS — K76.9 LIVER LESION: ICD-10-CM

## 2022-01-01 DIAGNOSIS — I42.9 CARDIOMYOPATHY, UNSPECIFIED TYPE: ICD-10-CM

## 2022-01-01 LAB
ABO GROUP BLD: NORMAL
ABO GROUP BLD: NORMAL
ALBUMIN SERPL-MCNC: 4.4 G/DL (ref 3.5–5.2)
ALBUMIN/GLOB SERPL: 2.3 G/DL
ALP SERPL-CCNC: 42 U/L (ref 39–117)
ALT SERPL W P-5'-P-CCNC: 19 U/L (ref 1–41)
ANION GAP SERPL CALCULATED.3IONS-SCNC: 10 MMOL/L (ref 5–15)
ANION GAP SERPL CALCULATED.3IONS-SCNC: 10 MMOL/L (ref 5–15)
ANION GAP SERPL CALCULATED.3IONS-SCNC: 11 MMOL/L (ref 5–15)
ANION GAP SERPL CALCULATED.3IONS-SCNC: 15 MMOL/L (ref 5–15)
ANION GAP SERPL CALCULATED.3IONS-SCNC: 7 MMOL/L (ref 5–15)
ANION GAP SERPL CALCULATED.3IONS-SCNC: 7 MMOL/L (ref 5–15)
ANION GAP SERPL CALCULATED.3IONS-SCNC: 8 MMOL/L (ref 5–15)
ANION GAP SERPL CALCULATED.3IONS-SCNC: 8 MMOL/L (ref 5–15)
APTT PPP: 26.7 SECONDS (ref 24.1–35)
AST SERPL-CCNC: 19 U/L (ref 1–40)
BACTERIA SPEC AEROBE CULT: NORMAL
BACTERIA SPEC AEROBE CULT: NORMAL
BASOPHILS # BLD AUTO: 0.05 10*3/MM3 (ref 0–0.2)
BASOPHILS NFR BLD AUTO: 0.3 % (ref 0–1.5)
BH BB BLOOD EXPIRATION DATE: NORMAL
BH BB BLOOD EXPIRATION DATE: NORMAL
BH BB BLOOD TYPE BARCODE: 6200
BH BB BLOOD TYPE BARCODE: 6200
BH BB DISPENSE STATUS: NORMAL
BH BB DISPENSE STATUS: NORMAL
BH BB PRODUCT CODE: NORMAL
BH BB PRODUCT CODE: NORMAL
BH BB UNIT NUMBER: NORMAL
BH BB UNIT NUMBER: NORMAL
BH CV ECHO LEFT VENTRICLE GLOBAL LONGITUDINAL STRAIN: -14 %
BH CV ECHO MEAS - AO MAX PG: 4.8 MMHG
BH CV ECHO MEAS - AO MAX PG: 6.5 MMHG
BH CV ECHO MEAS - AO MEAN PG: 3 MMHG
BH CV ECHO MEAS - AO MEAN PG: 3 MMHG
BH CV ECHO MEAS - AO ROOT DIAM: 2.6 CM
BH CV ECHO MEAS - AO ROOT DIAM: 3.8 CM
BH CV ECHO MEAS - AO V2 MAX: 109 CM/SEC
BH CV ECHO MEAS - AO V2 MAX: 127 CM/SEC
BH CV ECHO MEAS - AO V2 VTI: 25.9 CM
BH CV ECHO MEAS - AO V2 VTI: 29.1 CM
BH CV ECHO MEAS - AVA(I,D): 2.7 CM2
BH CV ECHO MEAS - AVA(I,D): 3.4 CM2
BH CV ECHO MEAS - EDV(CUBED): 174.7 ML
BH CV ECHO MEAS - EDV(CUBED): 83.5 ML
BH CV ECHO MEAS - EDV(MOD-SP2): 115 ML
BH CV ECHO MEAS - EDV(MOD-SP4): 134 ML
BH CV ECHO MEAS - EDV(MOD-SP4): 147 ML
BH CV ECHO MEAS - EF(MOD-BP): 53.9 %
BH CV ECHO MEAS - EF(MOD-SP2): 48.3 %
BH CV ECHO MEAS - EF(MOD-SP4): 46.9 %
BH CV ECHO MEAS - EF(MOD-SP4): 57.4 %
BH CV ECHO MEAS - ESV(CUBED): 31.9 ML
BH CV ECHO MEAS - ESV(CUBED): 47.8 ML
BH CV ECHO MEAS - ESV(MOD-SP2): 59.5 ML
BH CV ECHO MEAS - ESV(MOD-SP4): 57.1 ML
BH CV ECHO MEAS - ESV(MOD-SP4): 78 ML
BH CV ECHO MEAS - FS: 27.5 %
BH CV ECHO MEAS - FS: 35.1 %
BH CV ECHO MEAS - IVS/LVPW: 0.91 CM
BH CV ECHO MEAS - IVS/LVPW: 1.13 CM
BH CV ECHO MEAS - IVSD: 0.91 CM
BH CV ECHO MEAS - IVSD: 1.64 CM
BH CV ECHO MEAS - LA DIMENSION: 4 CM
BH CV ECHO MEAS - LAT PEAK E' VEL: 3.2 CM/SEC
BH CV ECHO MEAS - LAT PEAK E' VEL: 4.2 CM/SEC
BH CV ECHO MEAS - LV DIASTOLIC VOL/BSA (35-75): 72.5 CM2
BH CV ECHO MEAS - LV DIASTOLIC VOL/BSA (35-75): 76.8 CM2
BH CV ECHO MEAS - LV MASS(C)D: 206 GRAMS
BH CV ECHO MEAS - LV MASS(C)D: 276.6 GRAMS
BH CV ECHO MEAS - LV MAX PG: 2.8 MMHG
BH CV ECHO MEAS - LV MAX PG: 3.9 MMHG
BH CV ECHO MEAS - LV MEAN PG: 1 MMHG
BH CV ECHO MEAS - LV MEAN PG: 2 MMHG
BH CV ECHO MEAS - LV SYSTOLIC VOL/BSA (12-30): 30.9 CM2
BH CV ECHO MEAS - LV SYSTOLIC VOL/BSA (12-30): 40.7 CM2
BH CV ECHO MEAS - LV V1 MAX: 83.6 CM/SEC
BH CV ECHO MEAS - LV V1 MAX: 98.7 CM/SEC
BH CV ECHO MEAS - LV V1 VTI: 18.1 CM
BH CV ECHO MEAS - LV V1 VTI: 20.1 CM
BH CV ECHO MEAS - LVIDD: 4.4 CM
BH CV ECHO MEAS - LVIDD: 5.6 CM
BH CV ECHO MEAS - LVIDS: 3.2 CM
BH CV ECHO MEAS - LVIDS: 3.6 CM
BH CV ECHO MEAS - LVOT AREA: 3.8 CM2
BH CV ECHO MEAS - LVOT AREA: 4.9 CM2
BH CV ECHO MEAS - LVOT DIAM: 2.2 CM
BH CV ECHO MEAS - LVOT DIAM: 2.5 CM
BH CV ECHO MEAS - LVPWD: 1 CM
BH CV ECHO MEAS - LVPWD: 1.45 CM
BH CV ECHO MEAS - MED PEAK E' VEL: 3.3 CM/SEC
BH CV ECHO MEAS - MED PEAK E' VEL: 3.9 CM/SEC
BH CV ECHO MEAS - MR MAX PG: 88.7 MMHG
BH CV ECHO MEAS - MR MAX VEL: 471 CM/SEC
BH CV ECHO MEAS - MV A MAX VEL: 102 CM/SEC
BH CV ECHO MEAS - MV A MAX VEL: 79.5 CM/SEC
BH CV ECHO MEAS - MV DEC SLOPE: 428 CM/SEC2
BH CV ECHO MEAS - MV DEC TIME: 0.5 MSEC
BH CV ECHO MEAS - MV E MAX VEL: 49.5 CM/SEC
BH CV ECHO MEAS - MV E MAX VEL: 60.8 CM/SEC
BH CV ECHO MEAS - MV E/A: 0.6
BH CV ECHO MEAS - MV E/A: 0.62
BH CV ECHO MEAS - MV P1/2T: 46.1 MSEC
BH CV ECHO MEAS - MVA(P1/2T): 4.8 CM2
BH CV ECHO MEAS - PA V2 MAX: 69.8 CM/SEC
BH CV ECHO MEAS - PI END-D VEL: 121 CM/SEC
BH CV ECHO MEAS - RAP SYSTOLE: 10 MMHG
BH CV ECHO MEAS - SI(MOD-SP2): 30 ML/M2
BH CV ECHO MEAS - SI(MOD-SP4): 36 ML/M2
BH CV ECHO MEAS - SI(MOD-SP4): 41.6 ML/M2
BH CV ECHO MEAS - SV(LVOT): 68.8 ML
BH CV ECHO MEAS - SV(LVOT): 98.7 ML
BH CV ECHO MEAS - SV(MOD-SP2): 55.5 ML
BH CV ECHO MEAS - SV(MOD-SP4): 69 ML
BH CV ECHO MEAS - SV(MOD-SP4): 76.9 ML
BH CV ECHO MEAS - TR MAX VEL: 150 CM/SEC
BH CV ECHO MEASUREMENTS AVERAGE E/E' RATIO: 15.01
BH CV ECHO MEASUREMENTS AVERAGE E/E' RATIO: 15.23
BH CV XLRA - RV BASE: 2.6 CM
BILIRUB SERPL-MCNC: 0.5 MG/DL (ref 0–1.2)
BILIRUB UR QL STRIP: NEGATIVE
BLD GP AB SCN SERPL QL: NEGATIVE
BLD GP AB SCN SERPL QL: NEGATIVE
BUN SERPL-MCNC: 10 MG/DL (ref 8–23)
BUN SERPL-MCNC: 109 MG/DL (ref 8–23)
BUN SERPL-MCNC: 115 MG/DL (ref 8–23)
BUN SERPL-MCNC: 12 MG/DL (ref 8–23)
BUN SERPL-MCNC: 14 MG/DL (ref 8–23)
BUN SERPL-MCNC: 22 MG/DL (ref 8–23)
BUN SERPL-MCNC: 36 MG/DL (ref 8–23)
BUN SERPL-MCNC: 66 MG/DL (ref 8–23)
BUN/CREAT SERPL: 10.1 (ref 7–25)
BUN/CREAT SERPL: 15.6 (ref 7–25)
BUN/CREAT SERPL: 23.8 (ref 7–25)
BUN/CREAT SERPL: 36.7 (ref 7–25)
BUN/CREAT SERPL: 51.7 (ref 7–25)
BUN/CREAT SERPL: 55.6 (ref 7–25)
BUN/CREAT SERPL: 7.8 (ref 7–25)
BUN/CREAT SERPL: 8.5 (ref 7–25)
CALCIUM SPEC-SCNC: 8 MG/DL (ref 8.6–10.5)
CALCIUM SPEC-SCNC: 8.1 MG/DL (ref 8.6–10.5)
CALCIUM SPEC-SCNC: 8.2 MG/DL (ref 8.6–10.5)
CALCIUM SPEC-SCNC: 8.2 MG/DL (ref 8.6–10.5)
CALCIUM SPEC-SCNC: 8.5 MG/DL (ref 8.6–10.5)
CALCIUM SPEC-SCNC: 9.6 MG/DL (ref 8.6–10.5)
CHLORIDE SERPL-SCNC: 104 MMOL/L (ref 98–107)
CHLORIDE SERPL-SCNC: 109 MMOL/L (ref 98–107)
CHLORIDE SERPL-SCNC: 110 MMOL/L (ref 98–107)
CHLORIDE SERPL-SCNC: 110 MMOL/L (ref 98–107)
CHLORIDE SERPL-SCNC: 112 MMOL/L (ref 98–107)
CHLORIDE SERPL-SCNC: 114 MMOL/L (ref 98–107)
CHLORIDE SERPL-SCNC: 114 MMOL/L (ref 98–107)
CHLORIDE SERPL-SCNC: 116 MMOL/L (ref 98–107)
CLARITY UR: CLEAR
CO2 SERPL-SCNC: 21 MMOL/L (ref 22–29)
CO2 SERPL-SCNC: 22 MMOL/L (ref 22–29)
CO2 SERPL-SCNC: 23 MMOL/L (ref 22–29)
CO2 SERPL-SCNC: 23 MMOL/L (ref 22–29)
CO2 SERPL-SCNC: 24 MMOL/L (ref 22–29)
CO2 SERPL-SCNC: 25 MMOL/L (ref 22–29)
CO2 SERPL-SCNC: 25 MMOL/L (ref 22–29)
CO2 SERPL-SCNC: 26 MMOL/L (ref 22–29)
COLOR UR: YELLOW
CREAT BLDA-MCNC: 1.1 MG/DL (ref 0.6–1.3)
CREAT SERPL-MCNC: 1.28 MG/DL (ref 0.76–1.27)
CREAT SERPL-MCNC: 1.39 MG/DL (ref 0.76–1.27)
CREAT SERPL-MCNC: 1.41 MG/DL (ref 0.76–1.27)
CREAT SERPL-MCNC: 1.42 MG/DL (ref 0.76–1.27)
CREAT SERPL-MCNC: 1.51 MG/DL (ref 0.76–1.27)
CREAT SERPL-MCNC: 1.8 MG/DL (ref 0.76–1.27)
CREAT SERPL-MCNC: 2.07 MG/DL (ref 0.76–1.27)
CREAT SERPL-MCNC: 2.11 MG/DL (ref 0.76–1.27)
CREAT UR-MCNC: 50.6 MG/DL
CROSSMATCH INTERPRETATION: NORMAL
CROSSMATCH INTERPRETATION: NORMAL
CRP SERPL-MCNC: <0.3 MG/DL (ref 0–0.5)
CYTO UR: NORMAL
D-LACTATE SERPL-SCNC: 1.7 MMOL/L (ref 0.5–2)
D-LACTATE SERPL-SCNC: 3.2 MMOL/L (ref 0.5–2)
DEPRECATED RDW RBC AUTO: 45.3 FL (ref 37–54)
DEPRECATED RDW RBC AUTO: 46.9 FL (ref 37–54)
DEPRECATED RDW RBC AUTO: 48.6 FL (ref 37–54)
DEVELOPER EXPIRATION DATE: ABNORMAL
DEVELOPER LOT NUMBER: 215
EGFRCR SERPLBLD CKD-EPI 2021: 29.7 ML/MIN/1.73
EGFRCR SERPLBLD CKD-EPI 2021: 30.4 ML/MIN/1.73
EGFRCR SERPLBLD CKD-EPI 2021: 36 ML/MIN/1.73
EGFRCR SERPLBLD CKD-EPI 2021: 44.4 ML/MIN/1.73
EGFRCR SERPLBLD CKD-EPI 2021: 47.8 ML/MIN/1.73
EGFRCR SERPLBLD CKD-EPI 2021: 48.2 ML/MIN/1.73
EGFRCR SERPLBLD CKD-EPI 2021: 49.1 ML/MIN/1.73
EGFRCR SERPLBLD CKD-EPI 2021: 54.2 ML/MIN/1.73
EOSINOPHIL # BLD AUTO: 0 10*3/MM3 (ref 0–0.4)
EOSINOPHIL NFR BLD AUTO: 0 % (ref 0.3–6.2)
EOSINOPHIL SPEC QL MICRO: 0 % EOS/100 CELLS (ref 0–0)
ERYTHROCYTE [DISTWIDTH] IN BLOOD BY AUTOMATED COUNT: 14.3 % (ref 12.3–15.4)
ERYTHROCYTE [DISTWIDTH] IN BLOOD BY AUTOMATED COUNT: 14.6 % (ref 12.3–15.4)
ERYTHROCYTE [DISTWIDTH] IN BLOOD BY AUTOMATED COUNT: 15.4 % (ref 12.3–15.4)
EXPIRATION DATE: ABNORMAL
FECAL OCCULT BLOOD SCREEN, POC: POSITIVE
GLOBULIN UR ELPH-MCNC: 1.9 GM/DL
GLUCOSE SERPL-MCNC: 100 MG/DL (ref 65–99)
GLUCOSE SERPL-MCNC: 101 MG/DL (ref 65–99)
GLUCOSE SERPL-MCNC: 104 MG/DL (ref 65–99)
GLUCOSE SERPL-MCNC: 109 MG/DL (ref 65–99)
GLUCOSE SERPL-MCNC: 109 MG/DL (ref 65–99)
GLUCOSE SERPL-MCNC: 242 MG/DL (ref 65–99)
GLUCOSE SERPL-MCNC: 86 MG/DL (ref 65–99)
GLUCOSE SERPL-MCNC: 92 MG/DL (ref 65–99)
GLUCOSE UR STRIP-MCNC: NEGATIVE MG/DL
HBA1C MFR BLD: 6.6 % (ref 4.8–5.6)
HCT VFR BLD AUTO: 22.3 % (ref 37.5–51)
HCT VFR BLD AUTO: 22.8 % (ref 37.5–51)
HCT VFR BLD AUTO: 23.1 % (ref 37.5–51)
HCT VFR BLD AUTO: 23.3 % (ref 37.5–51)
HCT VFR BLD AUTO: 24.1 % (ref 37.5–51)
HCT VFR BLD AUTO: 24.3 % (ref 37.5–51)
HCT VFR BLD AUTO: 24.3 % (ref 37.5–51)
HCT VFR BLD AUTO: 25.2 % (ref 37.5–51)
HCT VFR BLD AUTO: 27.7 % (ref 37.5–51)
HCT VFR BLD AUTO: 28 % (ref 37.5–51)
HCT VFR BLD AUTO: 28.2 % (ref 37.5–51)
HCT VFR BLD AUTO: 28.6 % (ref 37.5–51)
HCT VFR BLD AUTO: 29.4 % (ref 37.5–51)
HCT VFR BLD AUTO: 30.6 % (ref 37.5–51)
HCT VFR BLD AUTO: 32.4 % (ref 37.5–51)
HCT VFR BLD AUTO: 33.4 % (ref 37.5–51)
HCT VFR BLD AUTO: 34.1 % (ref 37.5–51)
HCT VFR BLD AUTO: 38 % (ref 37.5–51)
HETEROPH AB SER QL LA: NEGATIVE
HGB BLD-MCNC: 10.1 G/DL (ref 13–17.7)
HGB BLD-MCNC: 10.8 G/DL (ref 13–17.7)
HGB BLD-MCNC: 11 G/DL (ref 13–17.7)
HGB BLD-MCNC: 11.4 G/DL (ref 13–17.7)
HGB BLD-MCNC: 12.7 G/DL (ref 13–17.7)
HGB BLD-MCNC: 7.5 G/DL (ref 13–17.7)
HGB BLD-MCNC: 7.6 G/DL (ref 13–17.7)
HGB BLD-MCNC: 7.6 G/DL (ref 13–17.7)
HGB BLD-MCNC: 7.7 G/DL (ref 13–17.7)
HGB BLD-MCNC: 7.7 G/DL (ref 13–17.7)
HGB BLD-MCNC: 8 G/DL (ref 13–17.7)
HGB BLD-MCNC: 8.2 G/DL (ref 13–17.7)
HGB BLD-MCNC: 8.9 G/DL (ref 13–17.7)
HGB BLD-MCNC: 9.1 G/DL (ref 13–17.7)
HGB BLD-MCNC: 9.2 G/DL (ref 13–17.7)
HGB BLD-MCNC: 9.7 G/DL (ref 13–17.7)
HGB BLD-MCNC: 9.9 G/DL (ref 13–17.7)
HGB UR QL STRIP.AUTO: NEGATIVE
IMM GRANULOCYTES # BLD AUTO: 0.12 10*3/MM3 (ref 0–0.05)
IMM GRANULOCYTES NFR BLD AUTO: 0.8 % (ref 0–0.5)
INR PPP: 1.28 (ref 0.91–1.09)
INR PPP: 1.63 (ref 0.91–1.09)
INR PPP: 1.75 (ref 0.91–1.09)
INR PPP: 2.01 (ref 0.91–1.09)
INR PPP: 2.08 (ref 0.91–1.09)
INR PPP: 2.13 (ref 0.91–1.09)
INR PPP: 2.16 (ref 0.91–1.09)
INR PPP: 2.22 (ref 0.91–1.09)
KETONES UR QL STRIP: NEGATIVE
LAB AP CASE REPORT: NORMAL
LEFT ATRIUM VOLUME INDEX: 43 ML/M2
LEFT ATRIUM VOLUME INDEX: 47.3 ML/M2
LEFT ATRIUM VOLUME: 66.4 ML
LEFT ATRIUM VOLUME: 90.8 CM3
LEUKOCYTE ESTERASE UR QL STRIP.AUTO: NEGATIVE
LV EF 2D ECHO EST: 35 %
LYMPHOCYTES # BLD AUTO: 2.24 10*3/MM3 (ref 0.7–3.1)
LYMPHOCYTES NFR BLD AUTO: 15.3 % (ref 19.6–45.3)
Lab: 215
Lab: NORMAL
MAGNESIUM SERPL-MCNC: 1.9 MG/DL (ref 1.6–2.4)
MAGNESIUM SERPL-MCNC: 2.4 MG/DL (ref 1.6–2.4)
MAXIMAL PREDICTED HEART RATE: 133 BPM
MAXIMAL PREDICTED HEART RATE: 134 BPM
MCH RBC QN AUTO: 29.1 PG (ref 26.6–33)
MCH RBC QN AUTO: 29.4 PG (ref 26.6–33)
MCH RBC QN AUTO: 29.6 PG (ref 26.6–33)
MCHC RBC AUTO-ENTMCNC: 32.5 G/DL (ref 31.5–35.7)
MCHC RBC AUTO-ENTMCNC: 32.5 G/DL (ref 31.5–35.7)
MCHC RBC AUTO-ENTMCNC: 33.4 G/DL (ref 31.5–35.7)
MCV RBC AUTO: 88 FL (ref 79–97)
MCV RBC AUTO: 89.4 FL (ref 79–97)
MCV RBC AUTO: 91.3 FL (ref 79–97)
MONOCYTES # BLD AUTO: 0.72 10*3/MM3 (ref 0.1–0.9)
MONOCYTES NFR BLD AUTO: 4.9 % (ref 5–12)
NEGATIVE CONTROL: NEGATIVE
NEUTROPHILS NFR BLD AUTO: 11.53 10*3/MM3 (ref 1.7–7)
NEUTROPHILS NFR BLD AUTO: 78.7 % (ref 42.7–76)
NITRITE UR QL STRIP: NEGATIVE
NRBC BLD AUTO-RTO: 0 /100 WBC (ref 0–0.2)
NT-PROBNP SERPL-MCNC: 1372 PG/ML (ref 0–1800)
PATH REPORT.FINAL DX SPEC: NORMAL
PATH REPORT.GROSS SPEC: NORMAL
PH UR STRIP.AUTO: <=5 [PH] (ref 5–8)
PLATELET # BLD AUTO: 176 10*3/MM3 (ref 140–450)
PLATELET # BLD AUTO: 176 10*3/MM3 (ref 140–450)
PLATELET # BLD AUTO: 260 10*3/MM3 (ref 140–450)
PMV BLD AUTO: 10.8 FL (ref 6–12)
PMV BLD AUTO: 10.9 FL (ref 6–12)
PMV BLD AUTO: 11 FL (ref 6–12)
POSITIVE CONTROL: POSITIVE
POTASSIUM SERPL-SCNC: 3.4 MMOL/L (ref 3.5–5.2)
POTASSIUM SERPL-SCNC: 3.4 MMOL/L (ref 3.5–5.2)
POTASSIUM SERPL-SCNC: 3.5 MMOL/L (ref 3.5–5.2)
POTASSIUM SERPL-SCNC: 3.5 MMOL/L (ref 3.5–5.2)
POTASSIUM SERPL-SCNC: 3.6 MMOL/L (ref 3.5–5.2)
POTASSIUM SERPL-SCNC: 3.7 MMOL/L (ref 3.5–5.2)
PROCALCITONIN SERPL-MCNC: 0.16 NG/ML (ref 0–0.25)
PROT SERPL-MCNC: 6.3 G/DL (ref 6–8.5)
PROT UR QL STRIP: NEGATIVE
PROTHROMBIN TIME: 15.5 SECONDS (ref 11.9–14.6)
PROTHROMBIN TIME: 18.7 SECONDS (ref 11.9–14.6)
PROTHROMBIN TIME: 19.7 SECONDS (ref 11.9–14.6)
PROTHROMBIN TIME: 22 SECONDS (ref 11.9–14.6)
PROTHROMBIN TIME: 22.6 SECONDS (ref 11.9–14.6)
PROTHROMBIN TIME: 23 SECONDS (ref 11.9–14.6)
PROTHROMBIN TIME: 23.2 SECONDS (ref 11.9–14.6)
PROTHROMBIN TIME: 23.8 SECONDS (ref 11.9–14.6)
QT INTERVAL: 404 MS
QT INTERVAL: 426 MS
QTC INTERVAL: 515 MS
QTC INTERVAL: 518 MS
RBC # BLD AUTO: 2.53 10*6/MM3 (ref 4.14–5.8)
RBC # BLD AUTO: 2.82 10*6/MM3 (ref 4.14–5.8)
RBC # BLD AUTO: 4.32 10*6/MM3 (ref 4.14–5.8)
RH BLD: POSITIVE
RH BLD: POSITIVE
SARS-COV-2 RNA PNL SPEC NAA+PROBE: NOT DETECTED
SODIUM SERPL-SCNC: 141 MMOL/L (ref 136–145)
SODIUM SERPL-SCNC: 141 MMOL/L (ref 136–145)
SODIUM SERPL-SCNC: 142 MMOL/L (ref 136–145)
SODIUM SERPL-SCNC: 143 MMOL/L (ref 136–145)
SODIUM SERPL-SCNC: 145 MMOL/L (ref 136–145)
SODIUM SERPL-SCNC: 146 MMOL/L (ref 136–145)
SODIUM SERPL-SCNC: 147 MMOL/L (ref 136–145)
SODIUM SERPL-SCNC: 149 MMOL/L (ref 136–145)
SODIUM UR-SCNC: 37 MMOL/L
SP GR UR STRIP: 1.02 (ref 1–1.03)
STRESS TARGET HR: 113 BPM
STRESS TARGET HR: 114 BPM
T&S EXPIRATION DATE: NORMAL
T&S EXPIRATION DATE: NORMAL
T3FREE SERPL-MCNC: 2.47 PG/ML (ref 2–4.4)
T4 FREE SERPL-MCNC: 1.33 NG/DL (ref 0.93–1.7)
TROPONIN T SERPL-MCNC: 0.05 NG/ML (ref 0–0.03)
TROPONIN T SERPL-MCNC: 0.06 NG/ML (ref 0–0.03)
TSH SERPL DL<=0.05 MIU/L-ACNC: 0.72 UIU/ML (ref 0.27–4.2)
UNIT  ABO: NORMAL
UNIT  ABO: NORMAL
UNIT  RH: NORMAL
UNIT  RH: NORMAL
UROBILINOGEN UR QL STRIP: NORMAL
UUN 24H UR-MCNC: 1087 MG/DL
WBC NRBC COR # BLD: 14.66 10*3/MM3 (ref 3.4–10.8)
WBC NRBC COR # BLD: 6.45 10*3/MM3 (ref 3.4–10.8)
WBC NRBC COR # BLD: 6.51 10*3/MM3 (ref 3.4–10.8)

## 2022-01-01 PROCEDURE — 84540 ASSAY OF URINE/UREA-N: CPT | Performed by: INTERNAL MEDICINE

## 2022-01-01 PROCEDURE — 99214 OFFICE O/P EST MOD 30 MIN: CPT | Performed by: INTERNAL MEDICINE

## 2022-01-01 PROCEDURE — 25010000002 PROPOFOL 10 MG/ML EMULSION

## 2022-01-01 PROCEDURE — 93306 TTE W/DOPPLER COMPLETE: CPT

## 2022-01-01 PROCEDURE — 84145 PROCALCITONIN (PCT): CPT | Performed by: PHYSICIAN ASSISTANT

## 2022-01-01 PROCEDURE — 80048 BASIC METABOLIC PNL TOTAL CA: CPT | Performed by: INTERNAL MEDICINE

## 2022-01-01 PROCEDURE — 93306 TTE W/DOPPLER COMPLETE: CPT | Performed by: INTERNAL MEDICINE

## 2022-01-01 PROCEDURE — 84481 FREE ASSAY (FT-3): CPT | Performed by: PHYSICIAN ASSISTANT

## 2022-01-01 PROCEDURE — 85730 THROMBOPLASTIN TIME PARTIAL: CPT | Performed by: PHYSICIAN ASSISTANT

## 2022-01-01 PROCEDURE — 85014 HEMATOCRIT: CPT | Performed by: INTERNAL MEDICINE

## 2022-01-01 PROCEDURE — 93010 ELECTROCARDIOGRAM REPORT: CPT | Performed by: INTERNAL MEDICINE

## 2022-01-01 PROCEDURE — 0 PHYTONADIONE 10 MG/ML SOLUTION: Performed by: INTERNAL MEDICINE

## 2022-01-01 PROCEDURE — 85610 PROTHROMBIN TIME: CPT | Performed by: CLINICAL NURSE SPECIALIST

## 2022-01-01 PROCEDURE — 36415 COLL VENOUS BLD VENIPUNCTURE: CPT | Performed by: INTERNAL MEDICINE

## 2022-01-01 PROCEDURE — 85027 COMPLETE CBC AUTOMATED: CPT | Performed by: INTERNAL MEDICINE

## 2022-01-01 PROCEDURE — 85018 HEMOGLOBIN: CPT | Performed by: INTERNAL MEDICINE

## 2022-01-01 PROCEDURE — 70450 CT HEAD/BRAIN W/O DYE: CPT

## 2022-01-01 PROCEDURE — 85025 COMPLETE CBC W/AUTO DIFF WBC: CPT | Performed by: PHYSICIAN ASSISTANT

## 2022-01-01 PROCEDURE — 0 GADOBENATE DIMEGLUMINE 529 MG/ML SOLUTION: Performed by: NURSE PRACTITIONER

## 2022-01-01 PROCEDURE — 86901 BLOOD TYPING SEROLOGIC RH(D): CPT | Performed by: PHYSICIAN ASSISTANT

## 2022-01-01 PROCEDURE — 86900 BLOOD TYPING SEROLOGIC ABO: CPT

## 2022-01-01 PROCEDURE — 93000 ELECTROCARDIOGRAM COMPLETE: CPT | Performed by: INTERNAL MEDICINE

## 2022-01-01 PROCEDURE — 86850 RBC ANTIBODY SCREEN: CPT | Performed by: PHYSICIAN ASSISTANT

## 2022-01-01 PROCEDURE — 84300 ASSAY OF URINE SODIUM: CPT | Performed by: INTERNAL MEDICINE

## 2022-01-01 PROCEDURE — 85610 PROTHROMBIN TIME: CPT | Performed by: PHYSICIAN ASSISTANT

## 2022-01-01 PROCEDURE — 81003 URINALYSIS AUTO W/O SCOPE: CPT | Performed by: PHYSICIAN ASSISTANT

## 2022-01-01 PROCEDURE — 99213 OFFICE O/P EST LOW 20 MIN: CPT | Performed by: INTERNAL MEDICINE

## 2022-01-01 PROCEDURE — 0DJD8ZZ INSPECTION OF LOWER INTESTINAL TRACT, VIA NATURAL OR ARTIFICIAL OPENING ENDOSCOPIC: ICD-10-PCS | Performed by: INTERNAL MEDICINE

## 2022-01-01 PROCEDURE — 74176 CT ABD & PELVIS W/O CONTRAST: CPT

## 2022-01-01 PROCEDURE — 99231 SBSQ HOSP IP/OBS SF/LOW 25: CPT | Performed by: INTERNAL MEDICINE

## 2022-01-01 PROCEDURE — 86923 COMPATIBILITY TEST ELECTRIC: CPT

## 2022-01-01 PROCEDURE — 82570 ASSAY OF URINE CREATININE: CPT | Performed by: INTERNAL MEDICINE

## 2022-01-01 PROCEDURE — 80053 COMPREHEN METABOLIC PANEL: CPT | Performed by: PHYSICIAN ASSISTANT

## 2022-01-01 PROCEDURE — 85610 PROTHROMBIN TIME: CPT | Performed by: INTERNAL MEDICINE

## 2022-01-01 PROCEDURE — 99285 EMERGENCY DEPT VISIT HI MDM: CPT

## 2022-01-01 PROCEDURE — 99232 SBSQ HOSP IP/OBS MODERATE 35: CPT | Performed by: CLINICAL NURSE SPECIALIST

## 2022-01-01 PROCEDURE — 84443 ASSAY THYROID STIM HORMONE: CPT | Performed by: PHYSICIAN ASSISTANT

## 2022-01-01 PROCEDURE — 82270 OCCULT BLOOD FECES: CPT | Performed by: PHYSICIAN ASSISTANT

## 2022-01-01 PROCEDURE — 87635 SARS-COV-2 COVID-19 AMP PRB: CPT | Performed by: PHYSICIAN ASSISTANT

## 2022-01-01 PROCEDURE — 93005 ELECTROCARDIOGRAM TRACING: CPT | Performed by: PHYSICIAN ASSISTANT

## 2022-01-01 PROCEDURE — 86901 BLOOD TYPING SEROLOGIC RH(D): CPT | Performed by: INTERNAL MEDICINE

## 2022-01-01 PROCEDURE — 99222 1ST HOSP IP/OBS MODERATE 55: CPT | Performed by: INTERNAL MEDICINE

## 2022-01-01 PROCEDURE — 86140 C-REACTIVE PROTEIN: CPT | Performed by: PHYSICIAN ASSISTANT

## 2022-01-01 PROCEDURE — 45378 DIAGNOSTIC COLONOSCOPY: CPT | Performed by: INTERNAL MEDICINE

## 2022-01-01 PROCEDURE — 88305 TISSUE EXAM BY PATHOLOGIST: CPT | Performed by: INTERNAL MEDICINE

## 2022-01-01 PROCEDURE — 83735 ASSAY OF MAGNESIUM: CPT | Performed by: INTERNAL MEDICINE

## 2022-01-01 PROCEDURE — 86900 BLOOD TYPING SEROLOGIC ABO: CPT | Performed by: PHYSICIAN ASSISTANT

## 2022-01-01 PROCEDURE — 75561 CARDIAC MRI FOR MORPH W/DYE: CPT | Performed by: INTERNAL MEDICINE

## 2022-01-01 PROCEDURE — 83605 ASSAY OF LACTIC ACID: CPT | Performed by: PHYSICIAN ASSISTANT

## 2022-01-01 PROCEDURE — 25010000002 PERFLUTREN 6.52 MG/ML SUSPENSION: Performed by: INTERNAL MEDICINE

## 2022-01-01 PROCEDURE — 43239 EGD BIOPSY SINGLE/MULTIPLE: CPT | Performed by: INTERNAL MEDICINE

## 2022-01-01 PROCEDURE — 93005 ELECTROCARDIOGRAM TRACING: CPT | Performed by: INTERNAL MEDICINE

## 2022-01-01 PROCEDURE — 86900 BLOOD TYPING SEROLOGIC ABO: CPT | Performed by: INTERNAL MEDICINE

## 2022-01-01 PROCEDURE — 75561 CARDIAC MRI FOR MORPH W/DYE: CPT

## 2022-01-01 PROCEDURE — 83036 HEMOGLOBIN GLYCOSYLATED A1C: CPT | Performed by: INTERNAL MEDICINE

## 2022-01-01 PROCEDURE — 36430 TRANSFUSION BLD/BLD COMPNT: CPT

## 2022-01-01 PROCEDURE — 93356 MYOCRD STRAIN IMG SPCKL TRCK: CPT

## 2022-01-01 PROCEDURE — 76775 US EXAM ABDO BACK WALL LIM: CPT

## 2022-01-01 PROCEDURE — 0DB78ZX EXCISION OF STOMACH, PYLORUS, VIA NATURAL OR ARTIFICIAL OPENING ENDOSCOPIC, DIAGNOSTIC: ICD-10-PCS | Performed by: INTERNAL MEDICINE

## 2022-01-01 PROCEDURE — 84484 ASSAY OF TROPONIN QUANT: CPT | Performed by: PHYSICIAN ASSISTANT

## 2022-01-01 PROCEDURE — 86308 HETEROPHILE ANTIBODY SCREEN: CPT | Performed by: PHYSICIAN ASSISTANT

## 2022-01-01 PROCEDURE — 36415 COLL VENOUS BLD VENIPUNCTURE: CPT

## 2022-01-01 PROCEDURE — 87205 SMEAR GRAM STAIN: CPT | Performed by: INTERNAL MEDICINE

## 2022-01-01 PROCEDURE — 82565 ASSAY OF CREATININE: CPT

## 2022-01-01 PROCEDURE — 71045 X-RAY EXAM CHEST 1 VIEW: CPT

## 2022-01-01 PROCEDURE — P9016 RBC LEUKOCYTES REDUCED: HCPCS

## 2022-01-01 PROCEDURE — A9577 INJ MULTIHANCE: HCPCS | Performed by: NURSE PRACTITIONER

## 2022-01-01 PROCEDURE — 86850 RBC ANTIBODY SCREEN: CPT | Performed by: INTERNAL MEDICINE

## 2022-01-01 PROCEDURE — 87040 BLOOD CULTURE FOR BACTERIA: CPT | Performed by: PHYSICIAN ASSISTANT

## 2022-01-01 PROCEDURE — 25010000002 PROPOFOL 10 MG/ML EMULSION: Performed by: NURSE ANESTHETIST, CERTIFIED REGISTERED

## 2022-01-01 PROCEDURE — 99231 SBSQ HOSP IP/OBS SF/LOW 25: CPT | Performed by: CLINICAL NURSE SPECIALIST

## 2022-01-01 PROCEDURE — 84439 ASSAY OF FREE THYROXINE: CPT | Performed by: PHYSICIAN ASSISTANT

## 2022-01-01 PROCEDURE — 93356 MYOCRD STRAIN IMG SPCKL TRCK: CPT | Performed by: INTERNAL MEDICINE

## 2022-01-01 PROCEDURE — 83880 ASSAY OF NATRIURETIC PEPTIDE: CPT | Performed by: PHYSICIAN ASSISTANT

## 2022-01-01 RX ORDER — LIDOCAINE HYDROCHLORIDE 20 MG/ML
INJECTION, SOLUTION EPIDURAL; INFILTRATION; INTRACAUDAL; PERINEURAL AS NEEDED
Status: DISCONTINUED | OUTPATIENT
Start: 2022-01-01 | End: 2022-01-01 | Stop reason: SURG

## 2022-01-01 RX ORDER — SODIUM CHLORIDE 0.9 % (FLUSH) 0.9 %
10 SYRINGE (ML) INJECTION EVERY 12 HOURS SCHEDULED
Status: DISCONTINUED | OUTPATIENT
Start: 2022-01-01 | End: 2022-01-01 | Stop reason: HOSPADM

## 2022-01-01 RX ORDER — OFLOXACIN 3 MG/ML
2 SOLUTION/ DROPS OPHTHALMIC 4 TIMES DAILY
Status: DISCONTINUED | OUTPATIENT
Start: 2022-01-01 | End: 2022-01-01 | Stop reason: HOSPADM

## 2022-01-01 RX ORDER — SODIUM CHLORIDE 0.9 % (FLUSH) 0.9 %
10 SYRINGE (ML) INJECTION AS NEEDED
Status: DISCONTINUED | OUTPATIENT
Start: 2022-01-01 | End: 2022-01-01 | Stop reason: HOSPADM

## 2022-01-01 RX ORDER — SODIUM CHLORIDE 450 MG/100ML
50 INJECTION, SOLUTION INTRAVENOUS CONTINUOUS
Status: DISCONTINUED | OUTPATIENT
Start: 2022-01-01 | End: 2022-01-01

## 2022-01-01 RX ORDER — PANTOPRAZOLE SODIUM 40 MG/10ML
40 INJECTION, POWDER, LYOPHILIZED, FOR SOLUTION INTRAVENOUS EVERY 12 HOURS SCHEDULED
Status: DISCONTINUED | OUTPATIENT
Start: 2022-01-01 | End: 2022-01-01

## 2022-01-01 RX ORDER — PROPOFOL 10 MG/ML
VIAL (ML) INTRAVENOUS AS NEEDED
Status: DISCONTINUED | OUTPATIENT
Start: 2022-01-01 | End: 2022-01-01 | Stop reason: SURG

## 2022-01-01 RX ORDER — OFLOXACIN 3 MG/ML
2 SOLUTION/ DROPS OPHTHALMIC 4 TIMES DAILY
Status: DISCONTINUED | OUTPATIENT
Start: 2022-01-01 | End: 2022-01-01 | Stop reason: SDUPTHER

## 2022-01-01 RX ORDER — FUROSEMIDE 40 MG/1
40 TABLET ORAL EVERY MORNING
COMMUNITY
Start: 2022-01-01

## 2022-01-01 RX ORDER — DEXTROSE, SODIUM CHLORIDE, AND POTASSIUM CHLORIDE 5; .45; .15 G/100ML; G/100ML; G/100ML
50 INJECTION INTRAVENOUS CONTINUOUS
Status: DISCONTINUED | OUTPATIENT
Start: 2022-01-01 | End: 2022-01-01

## 2022-01-01 RX ORDER — PANTOPRAZOLE SODIUM 40 MG/1
40 TABLET, DELAYED RELEASE ORAL
Status: DISCONTINUED | OUTPATIENT
Start: 2022-01-01 | End: 2022-01-01 | Stop reason: HOSPADM

## 2022-01-01 RX ORDER — HYDROCHLOROTHIAZIDE 25 MG/1
25 TABLET ORAL DAILY
Qty: 90 TABLET | Refills: 3 | Status: SHIPPED | OUTPATIENT
Start: 2022-01-01

## 2022-01-01 RX ORDER — SODIUM CHLORIDE 9 MG/ML
100 INJECTION, SOLUTION INTRAVENOUS CONTINUOUS
Status: DISCONTINUED | OUTPATIENT
Start: 2022-01-01 | End: 2022-01-01

## 2022-01-01 RX ORDER — ACETAMINOPHEN 325 MG/1
650 TABLET ORAL EVERY 6 HOURS PRN
Status: DISCONTINUED | OUTPATIENT
Start: 2022-01-01 | End: 2022-01-01 | Stop reason: HOSPADM

## 2022-01-01 RX ORDER — POTASSIUM CHLORIDE 750 MG/1
40 CAPSULE, EXTENDED RELEASE ORAL ONCE
Status: COMPLETED | OUTPATIENT
Start: 2022-01-01 | End: 2022-01-01

## 2022-01-01 RX ORDER — ATORVASTATIN CALCIUM 10 MG/1
20 TABLET, FILM COATED ORAL DAILY
Status: DISCONTINUED | OUTPATIENT
Start: 2022-01-01 | End: 2022-01-01 | Stop reason: HOSPADM

## 2022-01-01 RX ORDER — LISINOPRIL 2.5 MG/1
2.5 TABLET ORAL
Status: DISCONTINUED | OUTPATIENT
Start: 2022-01-01 | End: 2022-01-01 | Stop reason: HOSPADM

## 2022-01-01 RX ORDER — PHYTONADIONE 2 MG/ML
5 INJECTION, EMULSION INTRAMUSCULAR; INTRAVENOUS; SUBCUTANEOUS ONCE
Status: COMPLETED | OUTPATIENT
Start: 2022-01-01 | End: 2022-01-01

## 2022-01-01 RX ORDER — SODIUM CHLORIDE 9 MG/ML
100 INJECTION, SOLUTION INTRAVENOUS CONTINUOUS
Status: DISCONTINUED | OUTPATIENT
Start: 2022-01-01 | End: 2022-01-01 | Stop reason: HOSPADM

## 2022-01-01 RX ORDER — OFLOXACIN 3 MG/ML
2 SOLUTION/ DROPS OPHTHALMIC 4 TIMES DAILY
Qty: 2 ML | Refills: 0
Start: 2022-01-01 | End: 2022-01-01

## 2022-01-01 RX ORDER — PANTOPRAZOLE SODIUM 40 MG/10ML
80 INJECTION, POWDER, LYOPHILIZED, FOR SOLUTION INTRAVENOUS ONCE
Status: COMPLETED | OUTPATIENT
Start: 2022-01-01 | End: 2022-01-01

## 2022-01-01 RX ORDER — OFLOXACIN 3 MG/ML
2 SOLUTION/ DROPS OPHTHALMIC
Status: DISCONTINUED | OUTPATIENT
Start: 2022-01-01 | End: 2022-01-01 | Stop reason: HOSPADM

## 2022-01-01 RX ORDER — SIMVASTATIN 40 MG
40 TABLET ORAL NIGHTLY
COMMUNITY
Start: 2022-01-01

## 2022-01-01 RX ORDER — WARFARIN SODIUM 5 MG/1
5 TABLET ORAL NIGHTLY
COMMUNITY
Start: 2022-01-01

## 2022-01-01 RX ADMIN — GADOBENATE DIMEGLUMINE 15 ML: 529 INJECTION, SOLUTION INTRAVENOUS at 13:31

## 2022-01-01 RX ADMIN — PANTOPRAZOLE SODIUM 8 MG/HR: 40 INJECTION, POWDER, FOR SOLUTION INTRAVENOUS at 02:28

## 2022-01-01 RX ADMIN — PANTOPRAZOLE SODIUM 8 MG/HR: 40 INJECTION, POWDER, FOR SOLUTION INTRAVENOUS at 09:05

## 2022-01-01 RX ADMIN — SODIUM CHLORIDE 500 ML: 9 INJECTION, SOLUTION INTRAVENOUS at 09:51

## 2022-01-01 RX ADMIN — ATORVASTATIN CALCIUM 20 MG: 10 TABLET, FILM COATED ORAL at 09:29

## 2022-01-01 RX ADMIN — PANTOPRAZOLE SODIUM 40 MG: 40 INJECTION, POWDER, FOR SOLUTION INTRAVENOUS at 09:08

## 2022-01-01 RX ADMIN — Medication 10 ML: at 20:16

## 2022-01-01 RX ADMIN — PANTOPRAZOLE SODIUM 8 MG/HR: 40 INJECTION, POWDER, FOR SOLUTION INTRAVENOUS at 10:41

## 2022-01-01 RX ADMIN — LISINOPRIL 2.5 MG: 2.5 TABLET ORAL at 14:45

## 2022-01-01 RX ADMIN — PANTOPRAZOLE SODIUM 8 MG/HR: 40 INJECTION, POWDER, FOR SOLUTION INTRAVENOUS at 08:25

## 2022-01-01 RX ADMIN — METOPROLOL TARTRATE 25 MG: 25 TABLET, FILM COATED ORAL at 21:27

## 2022-01-01 RX ADMIN — SODIUM CHLORIDE 75 ML/HR: 4.5 INJECTION, SOLUTION INTRAVENOUS at 00:48

## 2022-01-01 RX ADMIN — LIDOCAINE HYDROCHLORIDE 50 MG: 20 INJECTION, SOLUTION EPIDURAL; INFILTRATION; INTRACAUDAL; PERINEURAL at 11:33

## 2022-01-01 RX ADMIN — METOPROLOL TARTRATE 25 MG: 25 TABLET, FILM COATED ORAL at 09:29

## 2022-01-01 RX ADMIN — Medication 10 ML: at 08:03

## 2022-01-01 RX ADMIN — Medication 10 ML: at 21:23

## 2022-01-01 RX ADMIN — PANTOPRAZOLE SODIUM 40 MG: 40 INJECTION, POWDER, FOR SOLUTION INTRAVENOUS at 17:11

## 2022-01-01 RX ADMIN — SODIUM CHLORIDE 100 ML/HR: 9 INJECTION, SOLUTION INTRAVENOUS at 11:51

## 2022-01-01 RX ADMIN — PANTOPRAZOLE SODIUM 8 MG/HR: 40 INJECTION, POWDER, FOR SOLUTION INTRAVENOUS at 02:47

## 2022-01-01 RX ADMIN — POTASSIUM CHLORIDE 40 MEQ: 10 CAPSULE, COATED, EXTENDED RELEASE ORAL at 16:27

## 2022-01-01 RX ADMIN — SODIUM CHLORIDE 100 ML/HR: 9 INJECTION, SOLUTION INTRAVENOUS at 09:31

## 2022-01-01 RX ADMIN — PANTOPRAZOLE SODIUM 8 MG/HR: 40 INJECTION, POWDER, FOR SOLUTION INTRAVENOUS at 13:04

## 2022-01-01 RX ADMIN — Medication 10 ML: at 21:31

## 2022-01-01 RX ADMIN — PANTOPRAZOLE SODIUM 8 MG/HR: 40 INJECTION, POWDER, FOR SOLUTION INTRAVENOUS at 04:05

## 2022-01-01 RX ADMIN — LISINOPRIL 2.5 MG: 2.5 TABLET ORAL at 09:08

## 2022-01-01 RX ADMIN — LISINOPRIL 2.5 MG: 2.5 TABLET ORAL at 09:00

## 2022-01-01 RX ADMIN — ATORVASTATIN CALCIUM 20 MG: 10 TABLET, FILM COATED ORAL at 08:03

## 2022-01-01 RX ADMIN — SODIUM CHLORIDE 75 ML/HR: 4.5 INJECTION, SOLUTION INTRAVENOUS at 15:50

## 2022-01-01 RX ADMIN — PHYTONADIONE 5 MG: 10 INJECTION, EMULSION INTRAMUSCULAR; INTRAVENOUS; SUBCUTANEOUS at 17:12

## 2022-01-01 RX ADMIN — SODIUM CHLORIDE 75 ML/HR: 4.5 INJECTION, SOLUTION INTRAVENOUS at 02:48

## 2022-01-01 RX ADMIN — LIDOCAINE HYDROCHLORIDE 100 MG: 20 INJECTION, SOLUTION EPIDURAL; INFILTRATION; INTRACAUDAL; PERINEURAL at 12:10

## 2022-01-01 RX ADMIN — METOPROLOL TARTRATE 25 MG: 25 TABLET, FILM COATED ORAL at 21:31

## 2022-01-01 RX ADMIN — PANTOPRAZOLE SODIUM 8 MG/HR: 40 INJECTION, POWDER, FOR SOLUTION INTRAVENOUS at 21:27

## 2022-01-01 RX ADMIN — Medication 10 ML: at 08:27

## 2022-01-01 RX ADMIN — LISINOPRIL 2.5 MG: 2.5 TABLET ORAL at 08:27

## 2022-01-01 RX ADMIN — OFLOXACIN 2 DROP: 3 SOLUTION/ DROPS OPHTHALMIC at 21:16

## 2022-01-01 RX ADMIN — POLYETHYLENE GLYCOL 3350, SODIUM SULFATE ANHYDROUS, SODIUM BICARBONATE, SODIUM CHLORIDE, POTASSIUM CHLORIDE 3000 ML: 236; 22.74; 6.74; 5.86; 2.97 POWDER, FOR SOLUTION ORAL at 17:42

## 2022-01-01 RX ADMIN — PANTOPRAZOLE SODIUM 8 MG/HR: 40 INJECTION, POWDER, FOR SOLUTION INTRAVENOUS at 18:07

## 2022-01-01 RX ADMIN — POLYETHYLENE GLYCOL 3350, SODIUM SULFATE ANHYDROUS, SODIUM BICARBONATE, SODIUM CHLORIDE, POTASSIUM CHLORIDE 1000 ML: 236; 22.74; 6.74; 5.86; 2.97 POWDER, FOR SOLUTION ORAL at 06:21

## 2022-01-01 RX ADMIN — PROPOFOL 120 MG: 10 INJECTION, EMULSION INTRAVENOUS at 12:10

## 2022-01-01 RX ADMIN — PERFLUTREN 13.04 MG: 6.52 INJECTION, SUSPENSION INTRAVENOUS at 16:17

## 2022-01-01 RX ADMIN — Medication 10 ML: at 15:43

## 2022-01-01 RX ADMIN — PERFLUTREN 9.78 MG: 6.52 INJECTION, SUSPENSION INTRAVENOUS at 13:49

## 2022-01-01 RX ADMIN — PANTOPRAZOLE SODIUM 8 MG/HR: 40 INJECTION, POWDER, FOR SOLUTION INTRAVENOUS at 00:53

## 2022-01-01 RX ADMIN — PANTOPRAZOLE SODIUM 8 MG/HR: 40 INJECTION, POWDER, FOR SOLUTION INTRAVENOUS at 21:32

## 2022-01-01 RX ADMIN — Medication 10 ML: at 09:08

## 2022-01-01 RX ADMIN — SODIUM CHLORIDE 100 ML/HR: 9 INJECTION, SOLUTION INTRAVENOUS at 12:38

## 2022-01-01 RX ADMIN — METOPROLOL TARTRATE 25 MG: 25 TABLET, FILM COATED ORAL at 20:26

## 2022-01-01 RX ADMIN — SODIUM CHLORIDE 50 ML/HR: 4.5 INJECTION, SOLUTION INTRAVENOUS at 03:43

## 2022-01-01 RX ADMIN — PROPOFOL 150 MG: 10 INJECTION, EMULSION INTRAVENOUS at 11:33

## 2022-01-01 RX ADMIN — ATORVASTATIN CALCIUM 20 MG: 10 TABLET, FILM COATED ORAL at 09:00

## 2022-01-01 RX ADMIN — PANTOPRAZOLE SODIUM 8 MG/HR: 40 INJECTION, POWDER, FOR SOLUTION INTRAVENOUS at 19:39

## 2022-01-01 RX ADMIN — Medication 10 ML: at 20:04

## 2022-01-01 RX ADMIN — METOPROLOL TARTRATE 25 MG: 25 TABLET, FILM COATED ORAL at 20:15

## 2022-01-01 RX ADMIN — OFLOXACIN 2 DROP: 3 SOLUTION/ DROPS OPHTHALMIC at 17:42

## 2022-01-01 RX ADMIN — SODIUM CHLORIDE 75 ML/HR: 4.5 INJECTION, SOLUTION INTRAVENOUS at 12:15

## 2022-01-01 RX ADMIN — ATORVASTATIN CALCIUM 20 MG: 10 TABLET, FILM COATED ORAL at 15:28

## 2022-01-01 RX ADMIN — PANTOPRAZOLE SODIUM 80 MG: 40 INJECTION, POWDER, FOR SOLUTION INTRAVENOUS at 12:08

## 2022-01-01 RX ADMIN — METOPROLOL TARTRATE 25 MG: 25 TABLET, FILM COATED ORAL at 20:04

## 2022-01-01 RX ADMIN — PANTOPRAZOLE SODIUM 8 MG/HR: 40 INJECTION, POWDER, FOR SOLUTION INTRAVENOUS at 14:49

## 2022-01-01 RX ADMIN — ATORVASTATIN CALCIUM 20 MG: 10 TABLET, FILM COATED ORAL at 08:24

## 2022-01-01 RX ADMIN — SODIUM CHLORIDE 50 ML/HR: 4.5 INJECTION, SOLUTION INTRAVENOUS at 07:33

## 2022-01-01 RX ADMIN — PANTOPRAZOLE SODIUM 8 MG/HR: 40 INJECTION, POWDER, FOR SOLUTION INTRAVENOUS at 23:17

## 2022-01-01 RX ADMIN — PANTOPRAZOLE SODIUM 8 MG/HR: 40 INJECTION, POWDER, FOR SOLUTION INTRAVENOUS at 15:09

## 2022-01-01 RX ADMIN — PANTOPRAZOLE SODIUM 8 MG/HR: 40 INJECTION, POWDER, FOR SOLUTION INTRAVENOUS at 02:43

## 2022-01-01 RX ADMIN — PANTOPRAZOLE SODIUM 8 MG/HR: 40 INJECTION, POWDER, FOR SOLUTION INTRAVENOUS at 07:32

## 2022-01-01 RX ADMIN — PANTOPRAZOLE SODIUM 8 MG/HR: 40 INJECTION, POWDER, FOR SOLUTION INTRAVENOUS at 20:28

## 2022-01-01 RX ADMIN — Medication 10 ML: at 09:29

## 2022-01-01 RX ADMIN — SODIUM CHLORIDE 100 ML/HR: 9 INJECTION, SOLUTION INTRAVENOUS at 11:17

## 2022-01-01 RX ADMIN — PHYTONADIONE 5 MG: 10 INJECTION, EMULSION INTRAMUSCULAR; INTRAVENOUS; SUBCUTANEOUS at 06:05

## 2022-01-01 RX ADMIN — METOPROLOL TARTRATE 25 MG: 25 TABLET, FILM COATED ORAL at 09:07

## 2022-01-01 RX ADMIN — PANTOPRAZOLE SODIUM 8 MG/HR: 40 INJECTION, POWDER, FOR SOLUTION INTRAVENOUS at 15:29

## 2022-01-01 RX ADMIN — ATORVASTATIN CALCIUM 20 MG: 10 TABLET, FILM COATED ORAL at 09:07

## 2022-01-01 RX ADMIN — SODIUM CHLORIDE 100 ML/HR: 9 INJECTION, SOLUTION INTRAVENOUS at 00:53

## 2022-01-01 RX ADMIN — ATORVASTATIN CALCIUM 20 MG: 10 TABLET, FILM COATED ORAL at 08:26

## 2022-04-08 NOTE — PROGRESS NOTES
Referring Provider: Ki Tamez MD    Reason for Follow-up Visit: CADS    Subjective .   Chief Complaint:   Chief Complaint   Patient presents with   • Coronary Artery Disease     6 mo fu   • Atrial Fibrillation     PCP CHANGE ELIQUIS TO WARFARIN DUE TO COST    • Hyperlipidemia     MOST RECENT LIPID WAS 2020   • Rapid Heart Rate     HAD AN EPISODE 3 MO AGO    • Edema     ANKLE EDEMA         History of present illness:  Mickey Conde is a 86 y.o. yo male CAD, s/p dtent placement in the past in for routine follow up. He complains of peripheral edema       History  Past Medical History:   Diagnosis Date   • Benign essential hypertension    • CAD (coronary artery disease)    • CAD in native artery     Story: lexiscan 2012 negative for ischemia   • Degeneration of lumbar or lumbosacral intervertebral disc 2019   • Enlarged prostate    • History of coronary artery stent placement 2019   • HTN (hypertension) 2017   • Hyperlipemia, mixed     Lipid panel (10/2014) 143/48/81/189   • Hyperlipidemia    • Hypertension    • Myocardial infarct (HCC) 2017   • Myocardial infarction (HCC)    ,   Past Surgical History:   Procedure Laterality Date   • BACK SURGERY     • CAROTID STENT     • LUMBAR LAMINECTOMY Left 2019    Procedure: LEFT LUMBAR HEMILAMINECTOMY WITHOUT FUSION AT LUMBAR 3/4 AND 4/5;  Surgeon: Stalin Williamson MD;  Location: [facility] OR;  Service: Neurosurgery   • ROTATOR CUFF REPAIR Bilateral    • SHOULDER SURGERY     • TENNIS ELBOW RELEASE     ,   Family History   Problem Relation Age of Onset   • ALS Mother    • Heart attack Father    • Alzheimer's disease Maternal Grandfather    • Heart attack Maternal Grandfather    • Heart attack Paternal Grandfather    ,   Social History     Tobacco Use   • Smoking status: Former Smoker     Types: Cigarettes     Start date:      Quit date: 1990     Years since quittin.2   • Smokeless tobacco: Former User   Vaping Use   • Vaping Use:  "Never used   Substance Use Topics   • Alcohol use: No   • Drug use: No   ,     Medications  Current Outpatient Medications   Medication Sig Dispense Refill   • acetaminophen (TYLENOL) 325 MG tablet Take 650 mg by mouth Every 6 (Six) Hours As Needed for Mild Pain .     • lisinopril (PRINIVIL,ZESTRIL) 30 MG tablet Take 1 tablet by mouth Daily. 90 tablet 3   • metoprolol tartrate (LOPRESSOR) 25 MG tablet Take 2 tablets by mouth 2 (Two) Times a Day. 180 tablet 3   • Misc Natural Products (OSTEO BI-FLEX ADV DOUBLE ST PO) Take  by mouth.     • warfarin (COUMADIN) 5 MG tablet TAKE ONE-HALF TABLET ON MONDAY, WEDNESDAY AND FRIDAY AND ONE TABLET ON TUESDAY, THURSDAY, SATURDAY AND SUNDAY     • hydroCHLOROthiazide (HYDRODIURIL) 25 MG tablet Take 1 tablet by mouth Daily. 90 tablet 3   • Sennosides-Docusate Sodium (STOOL SOFTENER & LAXATIVE PO) Take  by mouth 2 (Two) Times a Day As Needed.       No current facility-administered medications for this visit.       Allergies:  Penicillins    Review of Systems  Review of Systems   HENT: Negative for nosebleeds.    Cardiovascular: Positive for leg swelling. Negative for chest pain, claudication, dyspnea on exertion, near-syncope, orthopnea, palpitations, paroxysmal nocturnal dyspnea and syncope.   Respiratory: Negative for hemoptysis and shortness of breath.    Gastrointestinal: Negative for melena.   Genitourinary: Negative for hematuria.       Objective     Physical Exam:  /66 (BP Location: Left arm, Patient Position: Sitting, Cuff Size: Adult)   Pulse 51   Ht 170.2 cm (67.01\")   Wt 79.8 kg (176 lb)   SpO2 99%   BMI 27.56 kg/m²   Pulmonary:      Effort: Pulmonary effort is normal.      Breath sounds: Normal breath sounds.   Cardiovascular:      Normal rate. Regular rhythm.      Murmurs: There is no murmur.   Edema:     Peripheral edema present.     Pretibial: bilateral 2+ pitting edema of the pretibial area.        Results Review:    ECG 12 Lead    Date/Time: 4/8/2022 9:43 " AM  Performed by: Delano Padilla MD  Authorized by: Delano Padilla MD   Comparison: compared with previous ECG   Similar to previous ECG  Rhythm: sinus rhythm  Rate: normal  Conduction: left bundle branch block  QRS axis: left    Clinical impression: abnormal EKG            Admission on 09/03/2021, Discharged on 09/04/2021   Component Date Value Ref Range Status   • QT Interval 09/03/2021 362  ms Final   • QTC Interval 09/03/2021 503  ms Final   • Extra Tube 09/03/2021 Hold for add-ons.   Final    Auto resulted.   • Extra Tube 09/03/2021 hold for add-on   Final    Auto resulted   • Extra Tube 09/03/2021 Hold for add-ons.   Final    Auto resulted.   • Extra Tube 09/03/2021 Hold for add-ons.   Final    Auto resulted.   • Extra Tube 09/03/2021 hold for add-on   Final    Auto resulted   • COVID19 09/03/2021 Not Detected  Not Detected - Ref. Range Final   • PTT 09/03/2021 24.5  24.1 - 35.0 seconds Final   • proBNP 09/03/2021 1,556.0  0.0-1,800.0 pg/mL Final   • WBC 09/03/2021 19.19 (A) 3.40 - 10.80 10*3/mm3 Final   • RBC 09/03/2021 5.76  4.14 - 5.80 10*6/mm3 Final   • Hemoglobin 09/03/2021 17.2  13.0 - 17.7 g/dL Final   • Hematocrit 09/03/2021 51.2 (A) 37.5 - 51.0 % Final   • MCV 09/03/2021 88.9  79.0 - 97.0 fL Final   • MCH 09/03/2021 29.9  26.6 - 33.0 pg Final   • MCHC 09/03/2021 33.6  31.5 - 35.7 g/dL Final   • RDW 09/03/2021 14.6  12.3 - 15.4 % Final   • RDW-SD 09/03/2021 46.4  37.0 - 54.0 fl Final   • MPV 09/03/2021 10.1  6.0 - 12.0 fL Final   • Platelets 09/03/2021 273  140 - 450 10*3/mm3 Final   • Neutrophil % 09/03/2021 75.0  42.7 - 76.0 % Final   • Lymphocyte % 09/03/2021 13.2 (A) 19.6 - 45.3 % Final   • Monocyte % 09/03/2021 9.9  5.0 - 12.0 % Final   • Eosinophil % 09/03/2021 0.5  0.3 - 6.2 % Final   • Basophil % 09/03/2021 0.4  0.0 - 1.5 % Final   • Immature Grans % 09/03/2021 1.0 (A) 0.0 - 0.5 % Final   • Neutrophils, Absolute 09/03/2021 14.39 (A) 1.70 - 7.00 10*3/mm3 Final   • Lymphocytes, Absolute  09/03/2021 2.54  0.70 - 3.10 10*3/mm3 Final   • Monocytes, Absolute 09/03/2021 1.90 (A) 0.10 - 0.90 10*3/mm3 Final   • Eosinophils, Absolute 09/03/2021 0.09  0.00 - 0.40 10*3/mm3 Final   • Basophils, Absolute 09/03/2021 0.07  0.00 - 0.20 10*3/mm3 Final   • Immature Grans, Absolute 09/03/2021 0.20 (A) 0.00 - 0.05 10*3/mm3 Final   • nRBC 09/03/2021 0.0  0.0 - 0.2 /100 WBC Final   • Glucose 09/03/2021 215 (A) 65 - 99 mg/dL Final   • BUN 09/03/2021 21  8 - 23 mg/dL Final   • Creatinine 09/03/2021 0.73 (A) 0.76 - 1.27 mg/dL Final   • Sodium 09/03/2021 142  136 - 145 mmol/L Final   • Potassium 09/03/2021 3.7  3.5 - 5.2 mmol/L Final   • Chloride 09/03/2021 106  98 - 107 mmol/L Final   • CO2 09/03/2021 24.0  22.0 - 29.0 mmol/L Final   • Calcium 09/03/2021 8.9  8.6 - 10.5 mg/dL Final   • Total Protein 09/03/2021 6.6  6.0 - 8.5 g/dL Final   • Albumin 09/03/2021 4.70  3.50 - 5.20 g/dL Final   • ALT (SGPT) 09/03/2021 75 (A) 1 - 41 U/L Final   • AST (SGOT) 09/03/2021 25  1 - 40 U/L Final   • Alkaline Phosphatase 09/03/2021 66  39 - 117 U/L Final   • Total Bilirubin 09/03/2021 1.2  0.0 - 1.2 mg/dL Final   • eGFR Non African Amer 09/03/2021 102  >60 mL/min/1.73 Final   • Globulin 09/03/2021 1.9  gm/dL Final   • A/G Ratio 09/03/2021 2.5  g/dL Final   • BUN/Creatinine Ratio 09/03/2021 28.8 (A) 7.0 - 25.0 Final   • Anion Gap 09/03/2021 12.0  5.0 - 15.0 mmol/L Final   • Protime 09/03/2021 12.4  11.5 - 13.4 Seconds Final   • INR 09/03/2021 1.00  0.91 - 1.09 Final   • D-Dimer, Quantitative 09/03/2021 0.82 (A) 0.00 - 0.50 mg/L (FEU) Final   • Site 09/03/2021 Right Radial   Final   • Vadim's Test 09/03/2021 Positive   Final   • pH, Arterial 09/03/2021 7.376  7.350 - 7.450 pH units Final   • pCO2, Arterial 09/03/2021 45.4 (A) 35.0 - 45.0 mm Hg Final    83 Value above reference range   • pO2, Arterial 09/03/2021 63.9 (A) 83.0 - 108.0 mm Hg Final    84 Value below reference range   • HCO3, Arterial 09/03/2021 26.6 (A) 20.0 - 26.0 mmol/L  Final    83 Value above reference range   • Base Excess, Arterial 09/03/2021 0.8  0.0 - 2.0 mmol/L Final   • O2 Saturation, Arterial 09/03/2021 91.5 (A) 94.0 - 99.0 % Final    84 Value below reference range   • Temperature 09/03/2021 37.0  C Final   • Barometric Pressure for Blood Gas 09/03/2021 752  mmHg Final   • Modality 09/03/2021 Nasal Cannula   Final   • Flow Rate 09/03/2021 3.0  lpm Final   • Ventilator Mode 09/03/2021 NA   Final   • Collected by 09/03/2021 [id number]   Final    Meter: [id number]     :  [id number]   • pCO2, Temperature Corrected 09/03/2021 45.4 (A) 35 - 45 mm Hg Final   • pH, Temp Corrected 09/03/2021 7.376  7.350 - 7.450 pH Units Final   • pO2, Temperature Corrected 09/03/2021 63.9 (A) 83 - 108 mm Hg Final   • Troponin T 09/03/2021 <0.010  0.000 - 0.030 ng/mL Final   • TSH 09/03/2021 0.720  0.270 - 4.200 uIU/mL Final   • Glucose 09/04/2021 125 (A) 65 - 99 mg/dL Final   • BUN 09/04/2021 18  8 - 23 mg/dL Final   • Creatinine 09/04/2021 0.78  0.76 - 1.27 mg/dL Final   • Sodium 09/04/2021 142  136 - 145 mmol/L Final   • Potassium 09/04/2021 3.6  3.5 - 5.2 mmol/L Final   • Chloride 09/04/2021 101  98 - 107 mmol/L Final   • CO2 09/04/2021 30.0 (A) 22.0 - 29.0 mmol/L Final   • Calcium 09/04/2021 9.0  8.6 - 10.5 mg/dL Final   • eGFR Non African Amer 09/04/2021 94  >60 mL/min/1.73 Final   • BUN/Creatinine Ratio 09/04/2021 23.1  7.0 - 25.0 Final   • Anion Gap 09/04/2021 11.0  5.0 - 15.0 mmol/L Final   • WBC 09/04/2021 12.53 (A) 3.40 - 10.80 10*3/mm3 Final   • RBC 09/04/2021 5.39  4.14 - 5.80 10*6/mm3 Final   • Hemoglobin 09/04/2021 15.7  13.0 - 17.7 g/dL Final   • Hematocrit 09/04/2021 48.0  37.5 - 51.0 % Final   • MCV 09/04/2021 89.1  79.0 - 97.0 fL Final   • MCH 09/04/2021 29.1  26.6 - 33.0 pg Final   • MCHC 09/04/2021 32.7  31.5 - 35.7 g/dL Final   • RDW 09/04/2021 14.3  12.3 - 15.4 % Final   • RDW-SD 09/04/2021 45.6  37.0 - 54.0 fl Final   • MPV 09/04/2021 10.5  6.0 - 12.0 fL Final   •  Platelets 09/04/2021 257  140 - 450 10*3/mm3 Final   • Neutrophil % 09/04/2021 74.7  42.7 - 76.0 % Final   • Lymphocyte % 09/04/2021 12.4 (A) 19.6 - 45.3 % Final   • Monocyte % 09/04/2021 11.3  5.0 - 12.0 % Final   • Eosinophil % 09/04/2021 0.5  0.3 - 6.2 % Final   • Basophil % 09/04/2021 0.4  0.0 - 1.5 % Final   • Immature Grans % 09/04/2021 0.7 (A) 0.0 - 0.5 % Final   • Neutrophils, Absolute 09/04/2021 9.37 (A) 1.70 - 7.00 10*3/mm3 Final   • Lymphocytes, Absolute 09/04/2021 1.55  0.70 - 3.10 10*3/mm3 Final   • Monocytes, Absolute 09/04/2021 1.41 (A) 0.10 - 0.90 10*3/mm3 Final   • Eosinophils, Absolute 09/04/2021 0.06  0.00 - 0.40 10*3/mm3 Final   • Basophils, Absolute 09/04/2021 0.05  0.00 - 0.20 10*3/mm3 Final   • Immature Grans, Absolute 09/04/2021 0.09 (A) 0.00 - 0.05 10*3/mm3 Final   • nRBC 09/04/2021 0.0  0.0 - 0.2 /100 WBC Final       Assessment/Plan   Problem List Items Addressed This Visit        Cardiac and Vasculature    CAD (coronary artery disease) - Primary    Current Assessment & Plan     The patient denies chest pain, shortness of breath, dyspnea on exertion, orthopnea, PND, edema. There is no evidence of ongoing ischemia             Relevant Medications    metoprolol tartrate (LOPRESSOR) 25 MG tablet    HTN (hypertension)    Current Assessment & Plan     Good control           Relevant Medications    metoprolol tartrate (LOPRESSOR) 25 MG tablet    hydroCHLOROthiazide (HYDRODIURIL) 25 MG tablet    Paroxysmal atrial fibrillation (HCC)    Current Assessment & Plan     Maintaining SR. Had an episode of afib 9/3/2021           Relevant Medications    metoprolol tartrate (LOPRESSOR) 25 MG tablet    Mixed hyperlipidemia    Current Assessment & Plan     Good response to statin              Symptoms and Signs    Bilateral lower extremity edema    Current Assessment & Plan     Possibly related to amlodipine. Will stop this and add a HCTZ, plus increase the dose of metoprolol.                 Medical  Complexity  must have 2 out of 3     Moderate Complexity Level 4           1 of the following medical problems:          []One chronic illness with mild exacerbation         [x]Two or more stable chronic illness          [x]One new problem  []One acute illness with systemic symptoms    Complexity of Data  Reviewed (1 out of the 3 following categories)      Category 1 tests, documents, historian (must have 3 points)       []Review of prior external records  [x]Review of results of unique tests  [x]Ordering unique tests  []Assessment requires an independent historian    Category 2 Interpretation of tests   []Independent interpretation of test read by another doc    Category 3 Discuss Management/tests  []Discussion with external physician    Risk of complications and/or morbidity          [x]Prescription Drug Management

## 2022-04-08 NOTE — ASSESSMENT & PLAN NOTE
Will order an echo to assess his LV function. Possibly related to amlodipine. Will stop this and add a HCTZ, plus increase the dose of metoprolol.

## 2022-05-16 NOTE — TELEPHONE ENCOUNTER
----- Message from VIVIEN Samuel sent at 5/16/2022  8:59 AM CDT -----  Please let patient know his MRI showed no evidence of a clot in the bottom of his heart.

## 2022-05-16 NOTE — TELEPHONE ENCOUNTER
The patient is called and notified of the MRI result and he voices understanding.  Sherita Cuello MA

## 2022-07-11 NOTE — PROGRESS NOTES
Referring Provider: Ki Tamez MD    Reason for Follow-up Visit: CAD    Subjective .   Chief Complaint:   Chief Complaint   Patient presents with   • Follow-up     3 month fu   • Atrial Fibrillation     Pt states he has not had any symptoms.   • Coronary Artery Disease     Pt states he has had some fatigue/weakness but no chest pain.  Some swelling in left leg.  He takes lasix and it goes down.   • Hypertension   • Hyperlipidemia     Last lab done 11/2020 LDL was 61 on Zocor 20 mg       History of present illness:  Mickey Conde is a 86 y.o. yo male with CAD, s/p SIMON in 2019. In for routine follow up. Denies chest pain or SOB       History  Past Medical History:   Diagnosis Date   • Benign essential hypertension    • CAD (coronary artery disease)    • CAD in native artery     Story: lexiscan 1/2012 negative for ischemia   • Degeneration of lumbar or lumbosacral intervertebral disc 11/13/2019   • Enlarged prostate    • History of coronary artery stent placement 2/11/2019   • HTN (hypertension) 1/26/2017   • Hyperlipemia, mixed     Lipid panel (10/2014) 143/48/81/189   • Hyperlipidemia    • Hypertension    • Myocardial infarct (HCC) 1/26/2017   • Myocardial infarction (HCC)    ,   Past Surgical History:   Procedure Laterality Date   • BACK SURGERY     • CAROTID STENT     • LEG SURGERY      a alberto was put in.  pt broke his leg   • LUMBAR LAMINECTOMY Left 12/31/2019    Procedure: LEFT LUMBAR HEMILAMINECTOMY WITHOUT FUSION AT LUMBAR 3/4 AND 4/5;  Surgeon: Stalin Williamsno MD;  Location: [facility];  Service: Neurosurgery   • ROTATOR CUFF REPAIR Bilateral    • SHOULDER SURGERY     • TENNIS ELBOW RELEASE     ,   Family History   Problem Relation Age of Onset   • ALS Mother    • Heart attack Father    • Alzheimer's disease Maternal Grandfather    • Heart attack Maternal Grandfather    • Heart attack Paternal Grandfather    ,   Social History     Tobacco Use   • Smoking status: Former Smoker     Types:  "Cigarettes     Start date:      Quit date:      Years since quittin.5   • Smokeless tobacco: Former User   Vaping Use   • Vaping Use: Never used   Substance Use Topics   • Alcohol use: Never   • Drug use: Never   ,     Medications  Current Outpatient Medications   Medication Sig Dispense Refill   • furosemide (LASIX) 40 MG tablet Take 40 mg by mouth Every Morning.     • hydroCHLOROthiazide (HYDRODIURIL) 25 MG tablet Take 1 tablet by mouth Daily. 90 tablet 3   • lisinopril (PRINIVIL,ZESTRIL) 30 MG tablet Take 1 tablet by mouth Daily. 90 tablet 3   • metoprolol tartrate (LOPRESSOR) 25 MG tablet Take 2 tablets by mouth 2 (Two) Times a Day. (Patient taking differently: Take 50 mg by mouth Daily.) 180 tablet 3   • Misc Natural Products (OSTEO BI-FLEX ADV DOUBLE ST PO) Take  by mouth.     • simvastatin (ZOCOR) 40 MG tablet Daily.     • warfarin (COUMADIN) 5 MG tablet TAKE ONE-HALF TABLET ON MONDAY, WEDNESDAY AND FRIDAY AND ONE TABLET ON TUESDAY, THURSDAY, SATURDAY AND      • acetaminophen (TYLENOL) 325 MG tablet Take 650 mg by mouth Every 6 (Six) Hours As Needed for Mild Pain .     • Sennosides-Docusate Sodium (STOOL SOFTENER & LAXATIVE PO) Take  by mouth 2 (Two) Times a Day As Needed.       No current facility-administered medications for this visit.       Allergies:  Penicillins    Review of Systems  Review of Systems   HENT: Negative for nosebleeds.    Cardiovascular: Positive for leg swelling. Negative for chest pain, claudication, dyspnea on exertion, near-syncope, orthopnea, palpitations, paroxysmal nocturnal dyspnea and syncope.   Respiratory: Negative for hemoptysis and shortness of breath.    Gastrointestinal: Negative for melena.   Genitourinary: Negative for hematuria.       Objective     Physical Exam:  /80   Pulse 53   Ht 157.5 cm (62\")   Wt 76.2 kg (168 lb)   SpO2 98%   BMI 30.73 kg/m²   Pulmonary:      Effort: Pulmonary effort is normal.      Breath sounds: Normal breath " sounds.   Cardiovascular:      Normal rate. Regular rhythm.      Murmurs: There is no murmur.   Edema:     Pretibial: 1+ edema of the left pretibial area.     Ankle: 1+ edema of the left ankle.        Results Review:    ECG 12 Lead    Date/Time: 7/11/2022 9:59 AM  Performed by: Delano Padilla MD  Authorized by: Delano Padilla MD   Comparison: compared with previous ECG   Similar to previous ECG  Rhythm: sinus rhythm  Ectopy: atrial premature contractions  Rate: normal  Conduction: left bundle branch block    Clinical impression: abnormal EKG            Hospital Outpatient Visit on 04/28/2022   Component Date Value Ref Range Status   • Creatinine 04/28/2022 1.10  0.60 - 1.30 mg/dL Final    Serial Number: [id number]  [id number]       Assessment & Plan   Problem List Items Addressed This Visit        Cardiac and Vasculature    CAD (coronary artery disease) - Primary    Current Assessment & Plan     Doing well without evidence of ischemia           HTN (hypertension)    Current Assessment & Plan     Good control           Relevant Medications    furosemide (LASIX) 40 MG tablet    Paroxysmal atrial fibrillation (HCC)    Current Assessment & Plan     Maintaining SR           Mixed hyperlipidemia    Current Assessment & Plan     Patient's statin therapy is followed by PCP.             Relevant Medications    simvastatin (ZOCOR) 40 MG tablet          Medical Complexity  must have 1 out of 3     Moderate Complexity Level 3           1 of the following medical problems:          []One chronic illness with mild exacerbation         [x]Two or more stable chronic illness          []One new problem  []One acute illness with systemic symptoms    Complexity of Data  Reviewed (1 out of the 3 following categories)      Category 1 tests, documents, historian (must have 3 points)       []Review of prior external records  [x]Review of results of unique tests  []Ordering unique tests  []Assessment requires an independent  historian    Category 2 Interpretation of tests   []Independent interpretation of test read by another doc    Category 3 Discuss Management/tests  []Discussion with external physician    Risk of complications and/or morbidity          [x]Prescription Drug Management

## 2022-09-14 PROBLEM — K92.2 UPPER GI HEMORRHAGE: Status: ACTIVE | Noted: 2022-01-01

## 2022-09-14 NOTE — ED PROVIDER NOTES
Subjective   History of Present Illness    Patient is a pleasant 87-year-old gentleman who presents to ED with son present at bedside.  Chief complaint is increased weakness with new onset chills.  The patient describes that he is chronically weak.  In this past 1 month, he noted difficulty with ambulation more so than normal.  He denied any one-sided weakness, visual changes, or speech abnormalities.    In the past 3 to 4 days, he noticed that he is increasingly weak more than normal as well as nauseated.  He is not eating very much and and admittedly is not drinking a lot of water.  He is urinating with his last urine output approximately couple hours ago.  Today, he had significant chills.  He describes a he was just shaking and he felt very cold.  He contacted his son because he needed help with his 2 dogs.  EMS was notified the patient was brought here for further evaluation.    Patient denies any measured fever.  He denies any recent surgeries.  He denies any associated chest pain, pressure, tightness.  He does have significant history of paroxysmal atrial fibrillation and is on Coumadin.  He also has a history of MI in the remote past back in the 90s.  The patient denies any recent illnesses.  Denies any recent travels.  He denies any changes in medications in the past few months.  He denies any new pain.  He denies any cough, chest congestion, or upper respiratory symptoms.    Review of Systems   Constitutional: Positive for activity change, appetite change, chills and fatigue. Negative for fever.   HENT: Negative.    Eyes: Negative.    Respiratory: Negative.  Negative for cough, chest tightness, shortness of breath and stridor.    Cardiovascular: Negative.  Negative for chest pain and leg swelling.   Gastrointestinal: Positive for nausea. Negative for abdominal pain and vomiting.   Genitourinary: Negative for decreased urine volume and difficulty urinating.   Musculoskeletal: Positive for arthralgias.    Skin: Negative.    Neurological: Positive for weakness.   Psychiatric/Behavioral: Negative.    All other systems reviewed and are negative.      Past Medical History:   Diagnosis Date   • Benign essential hypertension    • CAD (coronary artery disease)    • CAD in native artery     Story: lexiscan 2012 negative for ischemia   • Degeneration of lumbar or lumbosacral intervertebral disc 2019   • Enlarged prostate    • History of coronary artery stent placement 2019   • HTN (hypertension) 2017   • Hyperlipemia, mixed     Lipid panel (10/2014) 143/48/81/189   • Hyperlipidemia    • Hypertension    • Myocardial infarct (HCC) 2017   • Myocardial infarction (HCC)        Allergies   Allergen Reactions   • Penicillins Rash       Past Surgical History:   Procedure Laterality Date   • BACK SURGERY     • CAROTID STENT     • LEG SURGERY      a alberto was put in.  pt broke his leg   • LUMBAR LAMINECTOMY Left 2019    Procedure: LEFT LUMBAR HEMILAMINECTOMY WITHOUT FUSION AT LUMBAR 3/4 AND 4/5;  Surgeon: Stalin Williamson MD;  Location: [facility] OR;  Service: Neurosurgery   • ROTATOR CUFF REPAIR Bilateral    • SHOULDER SURGERY     • TENNIS ELBOW RELEASE         Family History   Problem Relation Age of Onset   • ALS Mother    • Heart attack Father    • Alzheimer's disease Maternal Grandfather    • Heart attack Maternal Grandfather    • Heart attack Paternal Grandfather        Social History     Socioeconomic History   • Marital status:    Tobacco Use   • Smoking status: Former Smoker     Types: Cigarettes     Start date:      Quit date:      Years since quittin.7   • Smokeless tobacco: Former User   Vaping Use   • Vaping Use: Never used   Substance and Sexual Activity   • Alcohol use: Never   • Drug use: Never   • Sexual activity: Defer       Prior to Admission medications    Medication Sig Start Date End Date Taking? Authorizing Provider   acetaminophen (TYLENOL) 325 MG tablet Take  "650 mg by mouth Every 6 (Six) Hours As Needed for Mild Pain .    Michelle Barrett MD   furosemide (LASIX) 40 MG tablet Take 40 mg by mouth Every Morning. 5/11/22   Michelle Barrett MD   hydroCHLOROthiazide (HYDRODIURIL) 25 MG tablet Take 1 tablet by mouth Daily. 4/8/22   Delano Padilla MD   lisinopril (PRINIVIL,ZESTRIL) 30 MG tablet Take 1 tablet by mouth Daily. 3/23/20   Briseyda Rios APRN   metoprolol tartrate (LOPRESSOR) 25 MG tablet Take 2 tablets by mouth 2 (Two) Times a Day.  Patient taking differently: Take 50 mg by mouth Daily. 4/8/22   Delano Padilla MD   Misc Natural Products (OSTEO BI-FLEX ADV DOUBLE ST PO) Take  by mouth.    Michelle Barrett MD   Sennosides-Docusate Sodium (STOOL SOFTENER & LAXATIVE PO) Take  by mouth 2 (Two) Times a Day As Needed.    Michelle Barrett MD   simvastatin (ZOCOR) 40 MG tablet Daily. 6/21/22   Michelle Barrett MD   warfarin (COUMADIN) 5 MG tablet TAKE ONE-HALF TABLET ON MONDAY, WEDNESDAY AND FRIDAY AND ONE TABLET ON TUESDAY, THURSDAY, SATURDAY AND SUNDAY 1/11/22   Michelle Barrett MD       Medications   sodium chloride 0.9 % flush 10 mL (has no administration in time range)   sodium chloride 0.9 % infusion (100 mL/hr Intravenous New Bag 9/14/22 0931)   pantoprazole (PROTONIX) injection 80 mg (has no administration in time range)   sodium chloride 0.9 % bolus 500 mL (500 mL Intravenous New Bag 9/14/22 0951)       /62   Pulse 102   Temp 97.8 °F (36.6 °C)   Resp 20   Ht 170.2 cm (67\")   Wt 72.6 kg (160 lb)   SpO2 99%   BMI 25.06 kg/m²       Objective   Physical Exam  Vitals reviewed.   Constitutional:       Appearance: He is well-developed.   HENT:      Head: Normocephalic and atraumatic.      Nose: Nose normal.      Mouth/Throat:      Mouth: Mucous membranes are dry.   Eyes:      Extraocular Movements: Extraocular movements intact.      Conjunctiva/sclera: Conjunctivae normal.      Pupils: Pupils are equal, round, and reactive to " light.   Neck:      Trachea: No tracheal deviation.   Cardiovascular:      Rate and Rhythm: Normal rate and regular rhythm.      Heart sounds: Normal heart sounds. No murmur heard.    No friction rub. No gallop.   Pulmonary:      Effort: Pulmonary effort is normal. No respiratory distress.      Breath sounds: Normal breath sounds. No wheezing or rales.   Chest:      Chest wall: No tenderness.   Abdominal:      General: Bowel sounds are normal. There is no distension.      Palpations: Abdomen is soft. There is no mass.      Tenderness: There is no abdominal tenderness. There is no guarding or rebound.   Musculoskeletal:         General: No tenderness or deformity. Normal range of motion.      Cervical back: Normal range of motion and neck supple.   Skin:     General: Skin is warm and dry.      Capillary Refill: Capillary refill takes less than 2 seconds.      Coloration: Skin is not pale.      Findings: No erythema or rash.      Comments: Bilateral venous stasis skin changes to lower extremities   Neurological:      General: No focal deficit present.      Mental Status: He is alert and oriented to person, place, and time.      Sensory: No sensory deficit.      Motor: No weakness.      Coordination: Coordination normal.      Deep Tendon Reflexes: Reflexes are normal and symmetric.      Comments: Cranial nerve evaluation:  No aphasia.  PERRLA. Normal visual fields. Normal smooth eye movement.   No facial assymetry.  Tongue is midline with normal gag reflex.   Symmetrical/normal sternocleidomastoid movement.   No pronator drift.  No sensory deficits.  No motor deficits.   No ataxia.    Psychiatric:         Mood and Affect: Mood normal.         Behavior: Behavior normal.         Thought Content: Thought content normal.         Judgment: Judgment normal.         Procedures         Lab Results (last 24 hours)     Procedure Component Value Units Date/Time    CBC & Differential [[id number]]  (Abnormal) Collected: 09/14/22  0915    Specimen: Blood Updated: 09/14/22 0948    Narrative:      The following orders were created for panel order CBC & Differential.  Procedure                               Abnormality         Status                     ---------                               -----------         ------                     CBC Auto Differential[[id number]]        Abnormal            Final result                 Please view results for these tests on the individual orders.    Comprehensive Metabolic Panel [[id number]]  (Abnormal) Collected: 09/14/22 0915    Specimen: Blood Updated: 09/14/22 1002     Glucose 242 mg/dL       mg/dL      Creatinine 2.07 mg/dL      Sodium 145 mmol/L      Potassium 3.6 mmol/L      Chloride 104 mmol/L      CO2 26.0 mmol/L      Calcium 9.6 mg/dL      Total Protein 6.3 g/dL      Albumin 4.40 g/dL      ALT (SGPT) 19 U/L      AST (SGOT) 19 U/L      Alkaline Phosphatase 42 U/L      Total Bilirubin 0.5 mg/dL      Globulin 1.9 gm/dL      A/G Ratio 2.3 g/dL      BUN/Creatinine Ratio 55.6     Anion Gap 15.0 mmol/L      eGFR 30.4 mL/min/1.73      Comment: National Kidney Foundation and American Society of Nephrology (ASN) Task Force recommended calculation based on the Chronic Kidney Disease Epidemiology Collaboration (CKD-EPI) equation refit without adjustment for race.       Narrative:      GFR Normal >60  Chronic Kidney Disease <60  Kidney Failure <15      Protime-INR [[id number]]  (Abnormal) Collected: 09/14/22 0915    Specimen: Blood Updated: 09/14/22 0934     Protime 18.7 Seconds      INR 1.63    aPTT [[id number]]  (Normal) Collected: 09/14/22 0915    Specimen: Blood Updated: 09/14/22 0934     PTT 26.7 seconds     BNP [[id number]]  (Normal) Collected: 09/14/22 0915    Specimen: Blood Updated: 09/14/22 0945     proBNP 1,372.0 pg/mL     Narrative:      Among patients with dyspnea, NT-proBNP is highly sensitive for the detection of acute congestive heart failure. In addition NT-proBNP of <300 pg/ml  "effectively rules out acute congestive heart failure with 99% negative predictive value.    Results may be falsely decreased if patient taking Biotin.      Troponin [[id number]]  (Abnormal) Collected: 09/14/22 0915    Specimen: Blood Updated: 09/14/22 0950     Troponin T 0.052 ng/mL     Narrative:      Troponin T Reference Range:  <= 0.03 ng/mL-   Negative for AMI  >0.03 ng/mL-     Abnormal for myocardial necrosis.  Clinicians would have to utilize clinical acumen, EKG, Troponin and serial changes to determine if it is an Acute Myocardial Infarction or myocardial injury due to an underlying chronic condition.       Results may be falsely decreased if patient taking Biotin.      Blood Culture - Blood, Arm, Left [[id number]] Collected: 09/14/22 0915    Specimen: Blood from Arm, Left Updated: 09/14/22 0926    Blood Culture - Blood, Arm, Left [[id number]] Collected: 09/14/22 0915    Specimen: Blood from Arm, Left Updated: 09/14/22 0926    Lactic Acid, Plasma [[id number]]  (Abnormal) Collected: 09/14/22 0915    Specimen: Blood Updated: 09/14/22 0943     Lactate 3.2 mmol/L     Procalcitonin [[id number]]  (Normal) Collected: 09/14/22 0915    Specimen: Blood Updated: 09/14/22 0951     Procalcitonin 0.16 ng/mL     Narrative:      As a Marker for Sepsis (Non-Neonates):    1. <0.5 ng/mL represents a low risk of severe sepsis and/or septic shock.  2. >2 ng/mL represents a high risk of severe sepsis and/or septic shock.    As a Marker for Lower Respiratory Tract Infections that require antibiotic therapy:    PCT on Admission    Antibiotic Therapy       6-12 Hrs later    >0.5                Strongly Recommended  >0.25 - <0.5        Recommended   0.1 - 0.25          Discouraged              Remeasure/reassess PCT  <0.1                Strongly Discouraged     Remeasure/reassess PCT    As 28 day mortality risk marker: \"Change in Procalcitonin Result\" (>80% or <=80%) if Day 0 (or Day 1) and Day 4 values are available. Refer to " http://www.[facility]-pct-calculator.com    Change in PCT <=80%  A decrease of PCT levels below or equal to 80% defines a positive change in PCT test result representing a higher risk for 28-day all-cause mortality of patients diagnosed with severe sepsis for septic shock.    Change in PCT >80%  A decrease of PCT levels of more than 80% defines a negative change in PCT result representing a lower risk for 28-day all-cause mortality of patients diagnosed with severe sepsis or septic shock.       Mononucleosis Screen [[id number]]  (Normal) Collected: 09/14/22 0915    Specimen: Blood Updated: 09/14/22 1002     Monospot Negative    C-reactive Protein [[id number]]  (Normal) Collected: 09/14/22 0915    Specimen: Blood Updated: 09/14/22 0948     C-Reactive Protein <0.30 mg/dL     TSH [[id number]]  (Normal) Collected: 09/14/22 0915    Specimen: Blood Updated: 09/14/22 0952     TSH 0.720 uIU/mL     T4, Free [[id number]]  (Normal) Collected: 09/14/22 0915    Specimen: Blood Updated: 09/14/22 0951     Free T4 1.33 ng/dL     Narrative:      Results may be falsely increased if patient taking Biotin.      T3, Free [[id number]] Collected: 09/14/22 0915    Specimen: Blood Updated: 09/14/22 0921    CBC Auto Differential [[id number]]  (Abnormal) Collected: 09/14/22 0915    Specimen: Blood Updated: 09/14/22 0948     WBC 14.66 10*3/mm3      RBC 4.32 10*6/mm3      Hemoglobin 12.7 g/dL      Hematocrit 38.0 %      MCV 88.0 fL      MCH 29.4 pg      MCHC 33.4 g/dL      RDW 14.3 %      RDW-SD 45.3 fl      MPV 11.0 fL      Platelets 260 10*3/mm3      Neutrophil % 78.7 %      Lymphocyte % 15.3 %      Monocyte % 4.9 %      Eosinophil % 0.0 %      Basophil % 0.3 %      Immature Grans % 0.8 %      Neutrophils, Absolute 11.53 10*3/mm3      Lymphocytes, Absolute 2.24 10*3/mm3      Monocytes, Absolute 0.72 10*3/mm3      Eosinophils, Absolute 0.00 10*3/mm3      Basophils, Absolute 0.05 10*3/mm3      Immature Grans, Absolute 0.12 10*3/mm3      nRBC 0.0 /100 WBC      COVID PRE-OP / PRE-PROCEDURE SCREENING ORDER (NO ISOLATION) - Swab, Nasal Cavity [[id number]]  (Normal) Collected: 09/14/22 0929    Specimen: Swab from Nasal Cavity Updated: 09/14/22 1037    Narrative:      The following orders were created for panel order COVID PRE-OP / PRE-PROCEDURE SCREENING ORDER (NO ISOLATION) - Swab, Nasal Cavity.  Procedure                               Abnormality         Status                     ---------                               -----------         ------                     COVID-19,[facility] [employer] [facility]...[[id number]]  Normal              Final result                 Please view results for these tests on the individual orders.    COVID-19,[facility] [employer] IN-HOUSE,Nasal Swab No Transport Media 3-4 HR TAT - Swab, Nasal Cavity [[id number]]  (Normal) Collected: 09/14/22 0929    Specimen: Swab from Nasal Cavity Updated: 09/14/22 1037     COVID19 Not Detected    Narrative:      Fact sheet for providers: https://www.fda.gov/media/011439/download     Fact sheet for patients: https://www.fda.gov/media/862875/download    Test performed by PCR.    Consider negative results in combination with clinical observations, patient history, and epidemiological information.  Fact sheet for providers: https://www.fda.gov/media/883692/download     Fact sheet for patients: https://www.fda.gov/media/874957/download    Test performed by PCR.    Consider negative results in combination with clinical observations, patient history, and epidemiological information.    Urinalysis With Culture If Indicated - Urine, Clean Catch [[id number]] Collected: 09/14/22 1116    Specimen: Urine, Clean Catch Updated: 09/14/22 1121          CT Abdomen Pelvis Without Contrast    Result Date: 9/14/2022  Narrative: CT ABDOMEN PELVIS WO CONTRAST- 9/14/2022 10:26 AM CDT  HISTORY: gi hemorrhage  COMPARISON: None  DOSE LENGTH PRODUCT: 335 mGy cm. Automated exposure control was also utilized to decrease patient radiation dose.  TECHNIQUE: Axial  images the abdomen pelvis are performed without IV or oral contrast  FINDINGS:  Few scattered subcentimeter hypodensities liver too small to definitively characterize on nonenhanced imaging. Spleen is normal in size and appearance. No pancreatic cyst or mass identified. No peripancreatic inflammation. Several gallstones are identified with no pericholecystic inflammation or biliary dilatation. 1.6 cm left adrenal nodule with Hounsfield units measuring less than 10 favoring adenoma.  Low-density renal lesions favoring cysts. No abnormal perinephric fluid collection. No hydronephrosis. No ureteral stones. Multiple calcified phleboliths of the pelvis. Mild prostate enlargement measuring 5.2 cm in width. The bladder is unremarkable.  There is no evidence for bowel obstruction. There are cysts scattered diverticuli of the left colon with no significant mesenteric edema. Moderate stool of the rectum. Diffuse vascular calcification with a saccular 3 cm aneurysm of the infrarenal abdominal aorta. No pathologic lymphadenopathy identified.  Basilar atelectasis.  Osteopenia degenerative change of the regional skeleton. Postoperative changes right proximal femur.      Impression: 1. Left colonic diverticulosis with no convincing radiographic evidence of acute diverticulitis. The right colon is underdistended. The appendix is normal. There is moderate stool of the rectum. No free air or abscess. 2. Cholelithiasis. 3. 1.6 cm hypodense left adrenal nodule favoring adenoma. Low-density renal lesions strongly favoring benign cysts. 4. Subcentimeter hypodense foci of the liver too small to definitively characterize. 5. Mild prostate enlargement. 6. 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 Diffuse vascular calcification. This report was finalized on 09/14/2022 10:43 by Dr. Janeth Platt MD.    CT Head Without Contrast    Result Date: 9/14/2022  Narrative: EXAMINATION: CT HEAD WO CONTRAST-   9/14/2022 8:57 AM CDT   HISTORY: gait instability  In order to have a CT radiation dose as low as reasonably achievable Automated Exposure Control was utilized for adjustment of the mA and/or KV according to patient size.  DLP in mGycm= 660  The CT scan of the head is performed without contrast enhancement.  Images are acquired in axial plane and subsequent reconstruction in coronal and sagittal planes.  There is no previous study for comparison.  There is no evidence of a mass. There is no evidence of midline shift.  There is no evidence of intracranial hemorrhage or hematoma.  Moderate dilatation of ventricles, basal cisterns and cortical sulci suggest a chronic volume loss.  There are scattered areas of chronic white matter ischemia in the centrum semiovale bilaterally. The gray-white matter differentiation is maintained.  There are scattered areas of lacunar infarct in the left basal ganglia.  Images are reviewed in bone window show no acute bony abnormality. Limited visualized paranasal sinuses and mastoid air cells are clear. Atheromatous change of the intracranial internal carotid arteries are seen. A small focus of bony sclerosis in the right frontal supraorbital bone may represent a bone island.      Impression: 1. No acute intracranial abnormality. 2. Chronic ischemic and atrophic changes.          This report was finalized on 09/14/2022 09:48 by Dr. Filomena Dougherty MD.    XR Chest 1 View    Result Date: 9/14/2022  Narrative: EXAMINATION: Chest 1 view 9/14/2022  HISTORY: Chills and rigors with weakness.  FINDINGS: Today's exam is compared to previous study of 9/3/2021. There is elevation left diaphragm with left basilar atelectasis. Lungs are otherwise clear. There is no effusion or free air present. There is mild gaseous distention of the stomach.      Impression: 1.. Mild elevation of the left diaphragm with left basilar atelectasis. Lungs are otherwise clear. This report was finalized on 09/14/2022 09:00 by Dr. Dailey  MD Willy.      ED Course  ED Course as of 09/14/22 1125   Wed Sep 14, 2022   8471 The ER nurse informed the patient's blood pressure initially is 130/90.  Upon recheck, his blood pressure down to 80/40.  This was checked manually twice.  Upon further inquiry, the patient describes taking Aleve on a daily basis.  At 1 point, he took 3 in the morning and 3 at night.  In the past 6 weeks, he backed down to 3 Aleve's in the morning.  He admits that he has had black tarry stool intermittently for this past 1 week.  It started and then subsided.  The black tarry stool began again yesterday.  He had an episode this morning.  He denies any associated abdominal pain.  He denies ever completing colonoscopy.  He denies any alcohol intake or changes in his anticoagulant since last INR check. [TK]   0900 This patient was evaluated during a time of global shortage of iodinated contrast media. Based on guidance from the American College of Radiology, best practices, and local institutional approaches an alternative path for evaluating and managing the patient may have been employed in order to provide optimal care during this shortage. The current situation has been discussed with the patient.   [TK]   1116 I have reassessed the patient on several occasions.  His blood pressure has improved to 110s systolic.  I have spoken with both sons about his laboratory data as well as abnormal imaging.  Protonix bolus has been ordered as well.  I do have a call to hospitalist time for further admission due to his GI hemorrhage.  He and his sons are aware of the abnormal findings noted on CT scan of his abdomen pelvis. [TK]   1125 I did speak with Dr. Trotter, hospitalist on-call.  He is gracious to admit the patient under his services. [TK]      ED Course User Index  [TK] Sung Arshad PA          [facility]    Final diagnoses:   Upper GI hemorrhage   Abnormal CT of the abdomen   Liver lesion   Adrenal nodule (HCC)   Renal  lesion   Saccular aneurysm          King ThuCRISTINA Quiroz  09/14/22 112

## 2022-09-14 NOTE — H&P
4           [facility] Medicine Services  HISTORY AND PHYSICAL    Date of Admission: 9/14/2022  Primary Care Physician: Ki Tamez MD    Subjective     Chief Complaint: Black tarry stool    History of Present Illness  He is an 87-year-old man with known history of coronary artery disease, hypertension, hyperlipidemia.  NP Carol Ann asked me to admit this patient for GI bleeding.  Hemoglobin is 12.7 however BUN/creatinine is 115 and creatinine of 2.1.  She reports that patient has 1 week history of black tarry stool.  Patient on Lasix and hydrochlorothiazide based on listed medications. patient also on Coumadin for reported paroxysmal atrial fibrillation in addition to this he takes lisinopril for hypertension.  I am told that patient takes Aleve 3 times a day for pain.    Patient told me that he just feels cold and could not get warm.  Denies any fever or cough wheezing  His last bowel movement was yesterday he said it was dark in color  Had an episode of vomiting but patient tolerated  States that Aleve relieves his body aches which she could not get from Tylenol.  No history of alcohol  Review of Systems   Positive tremors  Had some incontinence early this morning  Uses a walker  No reported fall  No chest pain shortness of breath difficulty breathing  Otherwise complete ROS reviewed and negative except as mentioned in the HPI.    Past Medical History:   Past Medical History:   Diagnosis Date   • Benign essential hypertension    • CAD (coronary artery disease)    • CAD in native artery     Story: lexiscan 1/2012 negative for ischemia   • Degeneration of lumbar or lumbosacral intervertebral disc 11/13/2019   • Enlarged prostate    • History of coronary artery stent placement 2/11/2019   • HTN (hypertension) 1/26/2017   • Hyperlipemia, mixed     Lipid panel (10/2014) 143/48/81/189   • Hyperlipidemia    • Hypertension    • Myocardial infarct (HCC) 1/26/2017   • Myocardial infarction  "([facility])      Past Surgical History:  Past Surgical History:   Procedure Laterality Date   • BACK SURGERY     • CAROTID STENT     • LEG SURGERY      a alberto was put in.  pt broke his leg   • LUMBAR LAMINECTOMY Left 12/31/2019    Procedure: LEFT LUMBAR HEMILAMINECTOMY WITHOUT FUSION AT LUMBAR 3/4 AND 4/5;  Surgeon: Stalin Williamson MD;  Location: [facility];  Service: Neurosurgery   • ROTATOR CUFF REPAIR Bilateral    • SHOULDER SURGERY     • TENNIS ELBOW RELEASE       Social History:  reports that he quit smoking about 32 years ago. His smoking use included cigarettes. He started smoking about 66 years ago. He has quit using smokeless tobacco. He reports that he does not drink alcohol and does not use drugs.    Spouse passed away 6 years ago    Family History: family history includes ALS in his mother; Alzheimer's disease in his maternal grandfather; Heart attack in his father, maternal grandfather, and paternal grandfather.   \"I am the only one left\" as he refers to relatives besides his 2 children    Allergies:  Allergies   Allergen Reactions   • Penicillins Rash       Medications:  Prior to Admission medications    Medication Sig Start Date End Date Taking? Authorizing Provider   acetaminophen (TYLENOL) 325 MG tablet Take 650 mg by mouth Every 6 (Six) Hours As Needed for Mild Pain .    ProviderMichelle MD   furosemide (LASIX) 40 MG tablet Take 40 mg by mouth Every Morning. 5/11/22   ProviderMichelle MD   hydroCHLOROthiazide (HYDRODIURIL) 25 MG tablet Take 1 tablet by mouth Daily. 4/8/22   Delano Padilla MD   lisinopril (PRINIVIL,ZESTRIL) 30 MG tablet Take 1 tablet by mouth Daily. 3/23/20   Briseyda Rios APRN   metoprolol tartrate (LOPRESSOR) 25 MG tablet Take 2 tablets by mouth 2 (Two) Times a Day.  Patient taking differently: Take 50 mg by mouth Daily. 4/8/22   Delano Padilla MD   [facility] Natural Products (OSTEO BI-FLEX ADV DOUBLE ST PO) Take  by mouth.    ProviderMichelle MD " "  Sennosides-Docusate Sodium (STOOL SOFTENER & LAXATIVE PO) Take  by mouth 2 (Two) Times a Day As Needed.    ProviderMichelle MD   simvastatin (ZOCOR) 40 MG tablet Daily. 6/21/22   Michelle Barrett MD   warfarin (COUMADIN) 5 MG tablet TAKE ONE-HALF TABLET ON MONDAY, WEDNESDAY AND FRIDAY AND ONE TABLET ON TUESDAY, THURSDAY, SATURDAY AND SUNDAY 1/11/22   ProviderMichelle MD     I have utilized all available immediate resources to obtain, update, and review the patient's current medications.    Objective     Vital Signs: /62   Pulse 102   Temp 97.8 °F (36.6 °C)   Resp 20   Ht 170.2 cm (67\")   Wt 72.6 kg (160 lb)   SpO2 99%   BMI 25.06 kg/m²   Physical Exam   No distress  Mildly tachycardic at 102  Several missing teeth  Dry oral mucosa  GEN: Awake, alert, interactive, in NAD  HEENT: Atraumatic, PERRLA, EOMI, Anicteric, Trachea midline  Lungs: CTAB, no wheezing/rales/rhonchi  Heart: RRR, +S1/s2, no rub  ABD: soft, nt/nd, +BS, no guarding/rebound  Extremities: atraumatic, no cyanosis, no edema  Skin: no rashes or lesions  Neuro: AAOx3, no focal deficits  Psych: normal mood & affect        Results Reviewed:  Lab Results (last 24 hours)     Procedure Component Value Units Date/Time    Urinalysis With Culture If Indicated - Urine, Clean Catch [[id number]] Collected: 09/14/22 1116    Specimen: Urine, Clean Catch Updated: 09/14/22 1121    COVID PRE-OP / PRE-PROCEDURE SCREENING ORDER (NO ISOLATION) - Swab, Nasal Cavity [[id number]]  (Normal) Collected: 09/14/22 0929    Specimen: Swab from Nasal Cavity Updated: 09/14/22 1037    Narrative:      The following orders were created for panel order COVID PRE-OP / PRE-PROCEDURE SCREENING ORDER (NO ISOLATION) - Swab, Nasal Cavity.  Procedure                               Abnormality         Status                     ---------                               -----------         ------                     COVID-19,[facility] [employer] [facility]...[[id number]]  Normal             "  Final result                 Please view results for these tests on the individual orders.    COVID-19,[facility] [employer] IN-HOUSE,Nasal Swab No Transport Media 3-4 HR TAT - Swab, Nasal Cavity [[id number]]  (Normal) Collected: 09/14/22 0929    Specimen: Swab from Nasal Cavity Updated: 09/14/22 1037     COVID19 Not Detected    Narrative:      Fact sheet for providers: https://www.fda.gov/media/031066/download     Fact sheet for patients: https://www.fda.gov/media/966352/download    Test performed by PCR.    Consider negative results in combination with clinical observations, patient history, and epidemiological information.  Fact sheet for providers: https://www.fda.gov/media/364392/download     Fact sheet for patients: https://www.fda.gov/media/634120/download    Test performed by PCR.    Consider negative results in combination with clinical observations, patient history, and epidemiological information.    Mononucleosis Screen [[id number]]  (Normal) Collected: 09/14/22 0915    Specimen: Blood Updated: 09/14/22 1002     Monospot Negative    Comprehensive Metabolic Panel [[id number]]  (Abnormal) Collected: 09/14/22 0915    Specimen: Blood Updated: 09/14/22 1002     Glucose 242 mg/dL       mg/dL      Creatinine 2.07 mg/dL      Sodium 145 mmol/L      Potassium 3.6 mmol/L      Chloride 104 mmol/L      CO2 26.0 mmol/L      Calcium 9.6 mg/dL      Total Protein 6.3 g/dL      Albumin 4.40 g/dL      ALT (SGPT) 19 U/L      AST (SGOT) 19 U/L      Alkaline Phosphatase 42 U/L      Total Bilirubin 0.5 mg/dL      Globulin 1.9 gm/dL      A/G Ratio 2.3 g/dL      BUN/Creatinine Ratio 55.6     Anion Gap 15.0 mmol/L      eGFR 30.4 mL/min/1.73      Comment: National Kidney Foundation and American Society of Nephrology (ASN) Task Force recommended calculation based on the Chronic Kidney Disease Epidemiology Collaboration (CKD-EPI) equation refit without adjustment for race.       Narrative:      GFR Normal >60  Chronic Kidney Disease  "<60  Kidney Failure <15      TSH [[id number]]  (Normal) Collected: 09/14/22 0915    Specimen: Blood Updated: 09/14/22 0952     TSH 0.720 uIU/mL     Procalcitonin [[id number]]  (Normal) Collected: 09/14/22 0915    Specimen: Blood Updated: 09/14/22 0951     Procalcitonin 0.16 ng/mL     Narrative:      As a Marker for Sepsis (Non-Neonates):    1. <0.5 ng/mL represents a low risk of severe sepsis and/or septic shock.  2. >2 ng/mL represents a high risk of severe sepsis and/or septic shock.    As a Marker for Lower Respiratory Tract Infections that require antibiotic therapy:    PCT on Admission    Antibiotic Therapy       6-12 Hrs later    >0.5                Strongly Recommended  >0.25 - <0.5        Recommended   0.1 - 0.25          Discouraged              Remeasure/reassess PCT  <0.1                Strongly Discouraged     Remeasure/reassess PCT    As 28 day mortality risk marker: \"Change in Procalcitonin Result\" (>80% or <=80%) if Day 0 (or Day 1) and Day 4 values are available. Refer to http://www.[employer]-pct-calculator.com    Change in PCT <=80%  A decrease of PCT levels below or equal to 80% defines a positive change in PCT test result representing a higher risk for 28-day all-cause mortality of patients diagnosed with severe sepsis for septic shock.    Change in PCT >80%  A decrease of PCT levels of more than 80% defines a negative change in PCT result representing a lower risk for 28-day all-cause mortality of patients diagnosed with severe sepsis or septic shock.       T4, Free [[id number]]  (Normal) Collected: 09/14/22 0915    Specimen: Blood Updated: 09/14/22 0951     Free T4 1.33 ng/dL     Narrative:      Results may be falsely increased if patient taking Biotin.      Troponin [[id number]]  (Abnormal) Collected: 09/14/22 0915    Specimen: Blood Updated: 09/14/22 0950     Troponin T 0.052 ng/mL     Narrative:      Troponin T Reference Range:  <= 0.03 ng/mL-   Negative for AMI  >0.03 ng/mL-     Abnormal for " myocardial necrosis.  Clinicians would have to utilize clinical acumen, EKG, Troponin and serial changes to determine if it is an Acute Myocardial Infarction or myocardial injury due to an underlying chronic condition.       Results may be falsely decreased if patient taking Biotin.      C-reactive Protein [[id number]]  (Normal) Collected: 09/14/22 0915    Specimen: Blood Updated: 09/14/22 0948     C-Reactive Protein <0.30 mg/dL     CBC & Differential [[id number]]  (Abnormal) Collected: 09/14/22 0915    Specimen: Blood Updated: 09/14/22 0948    Narrative:      The following orders were created for panel order CBC & Differential.  Procedure                               Abnormality         Status                     ---------                               -----------         ------                     CBC Auto Differential[[id number]]        Abnormal            Final result                 Please view results for these tests on the individual orders.    CBC Auto Differential [[id number]]  (Abnormal) Collected: 09/14/22 0915    Specimen: Blood Updated: 09/14/22 0948     WBC 14.66 10*3/mm3      RBC 4.32 10*6/mm3      Hemoglobin 12.7 g/dL      Hematocrit 38.0 %      MCV 88.0 fL      MCH 29.4 pg      MCHC 33.4 g/dL      RDW 14.3 %      RDW-SD 45.3 fl      MPV 11.0 fL      Platelets 260 10*3/mm3      Neutrophil % 78.7 %      Lymphocyte % 15.3 %      Monocyte % 4.9 %      Eosinophil % 0.0 %      Basophil % 0.3 %      Immature Grans % 0.8 %      Neutrophils, Absolute 11.53 10*3/mm3      Lymphocytes, Absolute 2.24 10*3/mm3      Monocytes, Absolute 0.72 10*3/mm3      Eosinophils, Absolute 0.00 10*3/mm3      Basophils, Absolute 0.05 10*3/mm3      Immature Grans, Absolute 0.12 10*3/mm3      nRBC 0.0 /100 WBC     BNP [[id number]]  (Normal) Collected: 09/14/22 0915    Specimen: Blood Updated: 09/14/22 0945     proBNP 1,372.0 pg/mL     Narrative:      Among patients with dyspnea, NT-proBNP is highly sensitive for the detection of acute  congestive heart failure. In addition NT-proBNP of <300 pg/ml effectively rules out acute congestive heart failure with 99% negative predictive value.    Results may be falsely decreased if patient taking Biotin.      Lactic Acid, Plasma [[id number]]  (Abnormal) Collected: 09/14/22 0915    Specimen: Blood Updated: 09/14/22 0943     Lactate 3.2 mmol/L     Protime-INR [[id number]]  (Abnormal) Collected: 09/14/22 0915    Specimen: Blood Updated: 09/14/22 0934     Protime 18.7 Seconds      INR 1.63    aPTT [[id number]]  (Normal) Collected: 09/14/22 0915    Specimen: Blood Updated: 09/14/22 0934     PTT 26.7 seconds     Blood Culture - Blood, Arm, Left [[id number]] Collected: 09/14/22 0915    Specimen: Blood from Arm, Left Updated: 09/14/22 0926    Blood Culture - Blood, Arm, Left [[id number]] Collected: 09/14/22 0915    Specimen: Blood from Arm, Left Updated: 09/14/22 0926    T3, Free [[id number]] Collected: 09/14/22 0915    Specimen: Blood Updated: 09/14/22 0921        Imaging Results (Last 24 Hours)     Procedure Component Value Units Date/Time    CT Abdomen Pelvis Without Contrast [[id number]] Collected: 09/14/22 1037     Updated: 09/14/22 1046    Narrative:      CT ABDOMEN PELVIS WO CONTRAST- 9/14/2022 10:26 AM CDT     HISTORY: gi hemorrhage      COMPARISON: None     DOSE LENGTH PRODUCT: 335 mGy cm. Automated exposure control was also  utilized to decrease patient radiation dose.     TECHNIQUE: Axial images the abdomen pelvis are performed without IV or  oral contrast     FINDINGS:  Few scattered subcentimeter hypodensities liver too small to  definitively characterize on nonenhanced imaging. Spleen is normal in  size and appearance. No pancreatic cyst or mass identified. No  peripancreatic inflammation. Several gallstones are identified with no  pericholecystic inflammation or biliary dilatation. 1.6 cm left adrenal  nodule with Hounsfield units measuring less than 10 favoring adenoma.     Low-density renal lesions  favoring cysts. No abnormal perinephric fluid  collection. No hydronephrosis. No ureteral stones. Multiple calcified  phleboliths of the pelvis. Mild prostate enlargement measuring 5.2 cm in  width. The bladder is unremarkable.     There is no evidence for bowel obstruction. There are cysts scattered  diverticuli of the left colon with no significant mesenteric edema.  Moderate stool of the rectum. Diffuse vascular calcification with a  saccular 3 cm aneurysm of the infrarenal abdominal aorta. No pathologic  lymphadenopathy identified.     Basilar atelectasis.     Osteopenia degenerative change of the regional skeleton. Postoperative  changes right proximal femur.       Impression:      1. Left colonic diverticulosis with no convincing radiographic evidence  of acute diverticulitis. The right colon is underdistended. The appendix  is normal. There is moderate stool of the rectum. No free air or  abscess.  2. Cholelithiasis.  3. 1.6 cm hypodense left adrenal nodule favoring adenoma. Low-density  renal lesions strongly favoring benign cysts.  4. Subcentimeter hypodense foci of the liver too small to definitively  characterize.  5. Mild prostate enlargement.  6. 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 Diffuse  vascular calcification.  This report was finalized on 09/14/2022 10:43 by Dr. Janeth Platt MD.    CT Head Without Contrast [[id number]] Collected: 09/14/22 0943     Updated: 09/14/22 0951    Narrative:      EXAMINATION: CT HEAD WO CONTRAST-      9/14/2022 8:57 AM CDT     HISTORY: gait instability     In order to have a CT radiation dose as low as reasonably achievable  Automated Exposure Control was utilized for adjustment of the mA and/or  KV according to patient size.     DLP in mGycm= 660     The CT scan of the head is performed without contrast enhancement.     Images are acquired in axial plane and subsequent reconstruction in  coronal and sagittal planes.     There is no previous study  for comparison.     There is no evidence of a mass. There is no evidence of midline shift.     There is no evidence of intracranial hemorrhage or hematoma.     Moderate dilatation of ventricles, basal cisterns and cortical sulci  suggest a chronic volume loss.     There are scattered areas of chronic white matter ischemia in the  centrum semiovale bilaterally. The gray-white matter differentiation is  maintained.     There are scattered areas of lacunar infarct in the left basal ganglia.     Images are reviewed in bone window show no acute bony abnormality.  Limited visualized paranasal sinuses and mastoid air cells are clear.  Atheromatous change of the intracranial internal carotid arteries are  seen. A small focus of bony sclerosis in the right frontal supraorbital  bone may represent a bone island.       Impression:      1. No acute intracranial abnormality.  2. Chronic ischemic and atrophic changes.                             This report was finalized on 09/14/2022 09:48 by Dr. Filomena Dougherty MD.    XR Chest 1 View [[id number]] Collected: 09/14/22 0900     Updated: 09/14/22 0903    Narrative:      EXAMINATION: Chest 1 view 9/14/2022     HISTORY: Chills and rigors with weakness.     FINDINGS: Today's exam is compared to previous study of 9/3/2021. There  is elevation left diaphragm with left basilar atelectasis. Lungs are  otherwise clear. There is no effusion or free air present. There is mild  gaseous distention of the stomach.       Impression:      1.. Mild elevation of the left diaphragm with left basilar atelectasis.  Lungs are otherwise clear.  This report was finalized on 09/14/2022 09:00 by Dr. Asim Aguilera MD.        I have personally reviewed and interpreted the radiology studies and ECG obtained at time of admission.     Assessment / Plan     Assessment:   There are no active hospital problems to display for this patient.  Problem list  · Melena  · Acute kidney injury  · Suspected upper GI  bleed possibly peptic ulcer disease underlying  · Incidental finding of infrarenal saccular aneurysm  · Incidental finding of cholelithiasis  · Hypertension  · Lactic acidosis probably from transient hypotension.  Blood pressure now improved at 131/62  · Hyperglycemia  · Tremors  · Mildly elevated troponin in the setting of acute kidney injury.  No reported chest pain.  Trend troponin.  EKG has not been done yet.  We will obtain EKG  · Leukocytosis-no gross source of infection.  Possibly stress reaction.  Monitor closely  Plan:   PPI drip  IV fluid, monitor input output, monitor renal recovery; hold off on antihypertensive medication with exception of beta-blocker.  We will do this at the lower dose  Avoid nephrotoxic medication.  Avoiding hydrochlorothiazide, Lasix, lisinopril at current time acute kidney injury.  GI consult  Monitor H&H for bleeding; transfuse if hemoglobin less than 7 or less than 8 with active bleeding  IV fluid  Other plans per orders    Code Status/Advanced Care Plan DNR/DNI per patient's request in the presence of nurse Janeth  The patient's surrogate decision maker is suzanna Moreira  I discussed my findings and recommendations with the patient and nurse  Estimated length of stay is to be determined    The patient was seen and examined by me on   Electronically signed by Nixon Trotter MD, 09/14/22, 11:25 CDT.

## 2022-09-15 NOTE — CONSULTS
[facility]    Inpatient Gastroenterology Service    Report of Consultation    Antony Gudino MD, FACP    2022  22:04 CDT    Patient: Mickey Conde  : 1935  MRN: [id number]  Date of Admission: 2022    Consultation received from: GI Office    Indicated consult urgency: Routine    Consult received: .@1514        Patient referred for evaluation of melena  History is obtained from patient and EMR.  The information obtained appears to be reliable..        Chief Complaint  -melena        History of Present Illness  -87 y.o. male with one week history of melena.  Denies abdominal pain, other symptoms of bleeding.  On coumadin for A-fib.  He takes aleve frequently, as many as 3 po BID.        Gastroenterology Review of Systems:  -denies hematemesis, hematochezia, nausea, emesis, diarrhea, constipation, pyrosis, regurgitation, dysphagia, odynophagia, altered bowel habit, choluria, acholic stool, or jaundice        Past Medical History:   Diagnosis Date   • Benign essential hypertension    • CAD (coronary artery disease)    • CAD in native artery     Story: lexiscan 2012 negative for ischemia   • Degeneration of lumbar or lumbosacral intervertebral disc 2019   • Enlarged prostate    • History of coronary artery stent placement 2019   • HTN (hypertension) 2017   • Hyperlipemia, mixed     Lipid panel (10/2014) 143/48/81/189   • Hyperlipidemia    • Hypertension    • Myocardial infarct (HCC) 2017   • Myocardial infarction (HCC)            Past Surgical History:   Procedure Laterality Date   • BACK SURGERY     • CAROTID STENT     • LEG SURGERY      a alberto was put in.  pt broke his leg   • LUMBAR LAMINECTOMY Left 2019    Procedure: LEFT LUMBAR HEMILAMINECTOMY WITHOUT FUSION AT LUMBAR 3/4 AND 4/5;  Surgeon: Stalin Williamson MD;  Location: [facility] OR;  Service: Neurosurgery   • ROTATOR CUFF REPAIR Bilateral    • SHOULDER SURGERY     • TENNIS ELBOW  RELEASE             Past Endoscopy History  -no prior EGD  -no prior colonoscopy        Past Anesthesia/Sedation History  -no known prior issues with anesthesia or sedation        Past Procedural History  -prior heart cath (+)  -coronary artery stents (+)        Family History  -no report of family history of: gastric cancer, pancreatic cancer, colorectal cancer, breast cancer, ovarian cancer, uterine cancer, colorectal polyps, inflammatory bowel disease, Crohn's disease, ulcerative colitis, celiac disease, peptic ulcer disease, pancreatitis, hepatitis, cirrhosis        Social History  -  -tobacco: quit 1990  -ethanol: denies  -illicit/recreational substance use: denies        Medications  -see EMR lists  -Home medications of Record:  Misc Natural Products, Sennosides-Docusate Sodium, acetaminophen, furosemide, hydroCHLOROthiazide, lisinopril, metoprolol tartrate, simvastatin, and warfarin  Aleve 3 po BID        Physical Exam  Vital Signs:  Temp:  [97.8 °F (36.6 °C)-98.2 °F (36.8 °C)] 98.1 °F (36.7 °C)  Heart Rate:  [] 66  Resp:  [16-22] 21  BP: ()/(46-91) 109/54    General  -appears stated age  -no acute distress  -no outstanding physical abnormality    HEENT  -normocephalic  -atraumatic  -no drainage  -no bleeding  -mucosa appears moist     Neurological  -alert  -oriented  -normal speech  -no slowed response  -no tremor  -no obvious focal deficit  -moves all extremities without obvious abnormality    Psychiatric  -cooperative  -normal mood  -normal affect  -appropriate responses to questions  -no unusual behaviors noted    Skin (evaluation limited to exposed skin)  -dry  -no diaphoresis  -no icterus  -no rash     Neck  -supple  -normal passive ROM  -no JVD    Pulmonary  -normal respiratory effort  -no respiratory distress  -no stridor  -no accessory muscle engagement    Cardiovascular  -normal rate  -regular rhythm    Abdomen  -benign  -soft        Laboratory of Note  -see EMR  - Hb 15.7 >>>  12.7 >>> 11  -, Cr 2.07, BCR 55.6  -INR 1.63        Imaging of Note  -see EMR  CT ABDOMEN PELVIS WO CONTRAST- 9/14/2022 10:26 AM CDT     HISTORY: gi hemorrhage      COMPARISON: None     DOSE LENGTH PRODUCT: 335 mGy cm. Automated exposure control was also  utilized to decrease patient radiation dose.     TECHNIQUE: Axial images the abdomen pelvis are performed without IV or  oral contrast     FINDINGS:  Few scattered subcentimeter hypodensities liver too small to  definitively characterize on nonenhanced imaging. Spleen is normal in  size and appearance. No pancreatic cyst or mass identified. No  peripancreatic inflammation. Several gallstones are identified with no  pericholecystic inflammation or biliary dilatation. 1.6 cm left adrenal  nodule with Hounsfield units measuring less than 10 favoring adenoma.     Low-density renal lesions favoring cysts. No abnormal perinephric fluid  collection. No hydronephrosis. No ureteral stones. Multiple calcified  phleboliths of the pelvis. Mild prostate enlargement measuring 5.2 cm in  width. The bladder is unremarkable.     There is no evidence for bowel obstruction. There are cysts scattered  diverticuli of the left colon with no significant mesenteric edema.  Moderate stool of the rectum. Diffuse vascular calcification with a  saccular 3 cm aneurysm of the infrarenal abdominal aorta. No pathologic  lymphadenopathy identified.     Basilar atelectasis.     Osteopenia degenerative change of the regional skeleton. Postoperative  changes right proximal femur.     IMPRESSION:  1. Left colonic diverticulosis with no convincing radiographic evidence  of acute diverticulitis. The right colon is underdistended. The appendix  is normal. There is moderate stool of the rectum. No free air or  abscess.  2. Cholelithiasis.  3. 1.6 cm hypodense left adrenal nodule favoring adenoma. Low-density  renal lesions strongly favoring benign cysts.  4. Subcentimeter hypodense foci of the  liver too small to definitively  characterize.  5. Mild prostate enlargement.  6. 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 Diffuse  vascular calcification.  This report was finalized on 09/14/2022 10:43 by Dr. Janeth Platt MD.          Assessment  -melena with laboratory findings consistent with upper GI diathesis.  The patient is on warfarin, and takes large doses of aleve.        Recommendations  -agree with ICU admission  -acid suppression: IV PPi  -hold heparin, warfarin, DAOAC, aspirin, Plavix, etc. as clinically able  -avoid agents toxic to GI tract mucosa, anticoagulant/antiplatelet agents, as clinically practicable  -would not start sucralfate at this time, as it may make mucosal visualization more difficult should the patient develop signs of active bleeding necessitating urgent endoscopy  -follow H&H, transfuse as clinically appropriate, including FFP and platelets as appropriate  -clear liquid diet  -will plan EGD once INR is down to acceptable level, unless clinical course dictates otherwise  -please see endoscopy report(s) to follow for findings and further recommendations          Thank you for allowing us to participate in the care of this patient.    Sincerely,    SMOOTH Gudino MD, [facility]  [facility] Inpatient Gastroenterology Service

## 2022-09-15 NOTE — PROGRESS NOTES
[facility] Medicine Services  INPATIENT PROGRESS NOTE    Patient Name: Mickey Conde  Date of Admission: 9/14/2022  Today's Date: 09/15/22  Length of Stay: 1  Primary Care Physician: Ki Tamez MD    Subjective   Chief Complaint: f/u  HPI     Admitted September 14 for GI bleed, acute kidney injury.    Stool still black  No dizziness    Discussed with Dr. Gudino.  Rechecking PT/INR now    Review of Systems     All pertinent negatives and positives are as above. All other systems have been reviewed and are negative unless otherwise stated.     Objective    Temp:  [97.6 °F (36.4 °C)-98.2 °F (36.8 °C)] 97.6 °F (36.4 °C)  Heart Rate:  [] 55  Resp:  [12-22] 17  BP: ()/(42-77) 86/54  Physical Exam  No distress  Mildly tachycardic at 102  Several missing teeth  Dry oral mucosa  GEN: Awake, alert, interactive, in NAD  HEENT: Atraumatic, PERRLA, EOMI, Anicteric, Trachea midline  Lungs: CTAB, no wheezing/rales/rhonchi  Heart: RRR, +S1/s2, no rub  ABD: soft, nt/nd, +BS, no guarding/rebound  Extremities: atraumatic, no cyanosis, no edema  Skin: no rashes or lesions  Neuro: AAOx3, no focal deficits  Psych: normal mood & affect            Results Review:  I have reviewed the labs, radiology results, and diagnostic studies.    Laboratory Data:   Results from last 7 days   Lab Units 09/15/22  0835 09/15/22  0210 09/14/22  1933 09/14/22  1531 09/14/22  0915   WBC 10*3/mm3  --   --   --   --  14.66*   HEMOGLOBIN g/dL 10.8* 10.1* 11.4*   < > 12.7*   HEMATOCRIT % 33.4* 30.6* 32.4*   < > 38.0   PLATELETS 10*3/mm3  --   --   --   --  260    < > = values in this interval not displayed.        Results from last 7 days   Lab Units 09/15/22  0210 09/14/22  0915   SODIUM mmol/L 149* 145   POTASSIUM mmol/L 3.5 3.6   CHLORIDE mmol/L 114* 104   CO2 mmol/L 25.0 26.0   BUN mg/dL 109* 115*   CREATININE mg/dL 2.11* 2.07*   CALCIUM mg/dL 8.5* 9.6   BILIRUBIN mg/dL  --  0.5   ALK PHOS U/L  --   42   ALT (SGPT) U/L  --  19   AST (SGOT) U/L  --  19   GLUCOSE mg/dL 109* 242*       Culture Data:   Blood Culture   Date Value Ref Range Status   09/14/2022 No growth at 24 hours  Preliminary   09/14/2022 No growth at 24 hours  Preliminary       Radiology Data:   Imaging Results (Last 24 Hours)     ** No results found for the last 24 hours. **          I have reviewed the patient's current medications.     Assessment/Plan     Active Hospital Problems    Diagnosis    • Upper GI hemorrhage      Problem list  · Melena  · Acute kidney injury  · Suspected upper GI bleed possibly peptic ulcer disease underlying  · Incidental finding of infrarenal saccular aneurysm  · Incidental finding of cholelithiasis  · Hypertension  · Lactic acidosis probably from transient hypotension.  Hyperglycemia  · Tremors  · Mildly elevated troponin in the setting of acute kidney injury.  No reported chest pain.   flat trend troponin. EKG showed sinus rhythm with first-degree AV block, PAC and left bundle branch block. Wide QRS complex, left bundle branch block on earlier EKG.Similar findings from prior EKG with exception that the heart rate was slower at 57 (July 2022)  · Leukocytosis-no gross source of infection.  Possibly stress reaction.  Monitor closely  · hypernatremia    Plan:  Stat pt/inr; eliquis and asa on hold (hx of pafib)  Discussed with Dr. Shahab ha for now. No plans for egd today per GI  Anticipate scope tomorrow  Patient had + fluid balance of ~0.5L yesterday.   Blood on hold. hgb 10.8 (11.4)    Holding parameter in place on reduced dose of beta blocker.       Renal function still off. Adequate output (0.85L yesterday)   further w/u ordered in eval of elevated creatinine  Avoid nephrotoxic medication.  Avoiding hydrochlorothiazide, Lasix, lisinopril at current time acute kidney injury.       Monitor H&H for bleeding; transfuse if hemoglobin less than 7 or less than 8 with active bleeding    Change IVF to 1/2  normal.         Results for orders placed during the hospital encounter of 04/11/22    Adult Transthoracic Echo Complete w/ Color, Spectral and Contrast if necessary per protocol    Interpretation Summary  · The left ventricular cavity is mild to moderately dilated.  · Left ventricular wall thickness is consistent with borderline concentric hypertrophy.  · Estimated left ventricular EF = 35% Left ventricular systolic function is moderately decreased.  · Left ventricular diastolic function is consistent with (grade Ia w/high LAP) impaired relaxation and age.  · The following left ventricular wall segments are akinetic: apical septal and apex.  · A small apical thrombus can not be excluded    Recheck echo.     Follow labs    atorvastatin, 20 mg, Oral, Daily  metoprolol tartrate, 25 mg, Oral, BID  sodium chloride, 10 mL, Intravenous, Q12H      prob move to regular floor today        Discharge Planning: tbd    Electronically signed by Nixon Trotter MD, 09/15/22, 11:30 CDT.

## 2022-09-15 NOTE — CASE MANAGEMENT/SOCIAL WORK
Discharge Planning Assessment  [facility]     Patient Name: Mickey Conde  MRN: [id number]  Today's Date: 9/15/2022    Admit Date: 9/14/2022     Discharge Needs Assessment     Row Name 09/15/22 1425       Living Environment    People in Home alone    Current Living Arrangements home    Primary Care Provided by child(christopher)    Provides Primary Care For no one    Family Caregiver if Needed child(christopher), adult    Family Caregiver Names sons x2 next door       Transition Planning    Patient/Family Anticipates Transition to home    Transportation Anticipated family or friend will provide       Discharge Needs Assessment    Readmission Within the Last 30 Days no previous admission in last 30 days    Equipment Currently Used at Home walker, rolling    Concerns to be Addressed no discharge needs identified    Equipment Needed After Discharge none    Discharge Coordination/Progress spoke to patient who lives alone with sons next door; has RX coverage and PCP; independent at home prior to illness will follow for DC needs               Discharge Plan    No documentation.               Continued Care and Services - Admitted Since 9/14/2022    Coordination has not been started for this encounter.          Demographic Summary    No documentation.                Functional Status    No documentation.                Psychosocial    No documentation.                Abuse/Neglect    No documentation.                Legal    No documentation.                Substance Abuse    No documentation.                Patient Forms    No documentation.                   Kristin Bauer RN

## 2022-09-16 NOTE — CASE MANAGEMENT/SOCIAL WORK
Continued Stay Note   [facility]     Patient Name: Mickey Conde  MRN: [id number]  Today's Date: 9/16/2022    Admit Date: 9/14/2022     Discharge Plan     Row Name 09/16/22 1308       Plan    Plan Home    Patient/Family in Agreement with Plan yes    Plan Comments Pt lives alone and plans to return home at d/c. He has denied needs.               Discharge Codes    No documentation.               Expected Discharge Date and Time     Expected Discharge Date Expected Discharge Time    Sep 18, 2022             KISHA Ambriz

## 2022-09-16 NOTE — PROGRESS NOTES
Nutrition Services    Patient Name:  Mickey Conde  YOB: 1935  MRN: [id number]  Admit Date:  9/14/2022    CLD x 3 days. Pt may benefit from early [enteral/parenteral] feeding if appropriate with POC goals. If desired, RD available for recommendations. NTN following per protocol.      Electronically signed by:  Jade Guerin RDN, CELINA  09/16/22 16:29 CDT

## 2022-09-16 NOTE — PLAN OF CARE
Goal Outcome Evaluation:  Plan of Care Reviewed With: patient        Progress: no change  Outcome Evaluation: Pt denies pain; up with assist; voiding; IVF and protonix drip continue; clear liquid diet until GI clears for more; INR remains elevated, no scope planned today; no reports of BM this shift; safety maintained

## 2022-09-16 NOTE — PROGRESS NOTES
[facility] Gastroenterology  Inpatient Progress Note  Mickey Conde  1935 9/16/2022  Reason for Follow Up:  GI bleed    Subjective     Subjective:   Pt is AAO in chair. States that his last stool black in color was yesterday morning. No abdominal pain. No nausea or vomiting.     Current Facility-Administered Medications:   •  acetaminophen (TYLENOL) tablet 650 mg, 650 mg, Oral, Q6H PRN, Nixon Trotter MD  •  atorvastatin (LIPITOR) tablet 20 mg, 20 mg, Oral, Daily, Nixon Trotter MD, 20 mg at 09/16/22 0929  •  metoprolol tartrate (LOPRESSOR) tablet 25 mg, 25 mg, Oral, BID, Nixon Trotter MD, 25 mg at 09/16/22 0929  •  pantoprazole (PROTONIX) 40 mg in 100mL NS IVPB, 8 mg/hr, Intravenous, Continuous, Nixon Trotter MD, Last Rate: 20 mL/hr at 09/16/22 0243, 8 mg/hr at 09/16/22 0243  •  sodium chloride 0.45 % infusion, 75 mL/hr, Intravenous, Continuous, Nixon Trotter MD, Last Rate: 75 mL/hr at 09/16/22 0048, 75 mL/hr at 09/16/22 0048  •  [COMPLETED] Insert peripheral IV, , , Once **AND** sodium chloride 0.9 % flush 10 mL, 10 mL, Intravenous, PRN, Nixon Trotter MD  •  sodium chloride 0.9 % flush 10 mL, 10 mL, Intravenous, Q12H, Nixon Trotter MD, 10 mL at 09/16/22 0929  •  sodium chloride 0.9 % flush 10 mL, 10 mL, Intravenous, PRN, Nixon Trotter MD    Review of Systems:    Review of Systems   Constitutional: Negative for activity change, appetite change, chills, diaphoresis, fatigue, fever and unexpected weight change.   HENT: Negative for ear pain, hearing loss, mouth sores, sore throat, trouble swallowing and voice change.    Eyes: Negative.    Respiratory: Negative for cough, choking, shortness of breath and wheezing.    Cardiovascular: Negative for chest pain and palpitations.   Gastrointestinal: Negative for abdominal pain, blood in stool, constipation, diarrhea, nausea and vomiting.   Endocrine: Negative for  cold intolerance and heat intolerance.   Genitourinary: Negative for decreased urine volume, dysuria, frequency, hematuria and urgency.   Musculoskeletal: Negative for back pain, gait problem and myalgias.   Skin: Negative for color change, pallor and rash.   Allergic/Immunologic: Negative for food allergies and immunocompromised state.   Neurological: Negative for dizziness, tremors, seizures, syncope, weakness, light-headedness, numbness and headaches.   Hematological: Negative for adenopathy. Does not bruise/bleed easily.   Psychiatric/Behavioral: Negative for agitation and confusion. The patient is not nervous/anxious.    All other systems reviewed and are negative.       Objective     Vital Signs  Temp:  [97.8 °F (36.6 °C)-98.7 °F (37.1 °C)] 98.2 °F (36.8 °C)  Heart Rate:  [56-69] 57  Resp:  [16-20] 18  BP: (107-155)/(43-79) 131/66  Body mass index is 27.31 kg/m².    Intake/Output Summary (Last 24 hours) at 9/16/2022 0945  Last data filed at 9/16/2022 0754  Gross per 24 hour   Intake 1357.03 ml   Output 1350 ml   Net 7.03 ml     I/O this shift:  In: -   Out: 200 [Urine:200]       Physical Exam:   General: patient awake, alert and cooperative, no acute distress   Eyes: Normal lids and lashes, no scleral icterus   Neck: supple, no JVD   Skin: warm and dry   Cardiovascular: regular rhythm and rate, no murmurs auscultated   Pulm: clear to auscultation bilaterally, regular and unlabored   Abdomen: soft, nontender, nondistended; normal bowel sounds   Psychiatric: Normal mood and behavior; converses appropriately     Results Review:    I have reviewed all of the patients current test results    Results from last 7 days   Lab Units 09/16/22  0714 09/16/22  0438 09/16/22  0030 09/14/22  1531 09/14/22  0915   WBC 10*3/mm3  --   --   --   --  14.66*   HEMOGLOBIN g/dL 8.9* 9.1* 9.9*   < > 12.7*   HEMATOCRIT % 28.2* 27.7* 29.4*   < > 38.0   PLATELETS 10*3/mm3  --   --   --   --  260    < > = values in this interval not  displayed.       Results from last 7 days   Lab Units 09/16/22  0438 09/15/22  0210 09/14/22  0915   SODIUM mmol/L 147* 149* 145   POTASSIUM mmol/L 3.7 3.5 3.6   CHLORIDE mmol/L 116* 114* 104   CO2 mmol/L 23.0 25.0 26.0   BUN mg/dL 66* 109* 115*   CREATININE mg/dL 1.80* 2.11* 2.07*   CALCIUM mg/dL 8.2* 8.5* 9.6   BILIRUBIN mg/dL  --   --  0.5   ALK PHOS U/L  --   --  42   ALT (SGPT) U/L  --   --  19   AST (SGOT) U/L  --   --  19   GLUCOSE mg/dL 104* 109* 242*       Results from last 7 days   Lab Units 09/15/22  1335 09/14/22  0915   INR  2.22* 1.63*       Lab Results   Lab Value Date/Time    LIPASE 147 04/18/2018 0528       Radiology:    Imaging Results (Last 24 Hours)     Procedure Component Value Units Date/Time    US Renal Bilateral [[id number]] Collected: 09/16/22 0900     Updated: 09/16/22 0906    Narrative:      HISTORY: Acute kidney injury     Renal ultrasound: Sonographic imaging of the kidneys and bladder  performed     COMPARISON: CT abdomen pelvis 9/14/2022     FINDINGS: Kidneys are normal in echotexture. The right kidney measures  10.7 x 6.0 x 5.8 cm. There are scattered right renal cysts, largest  measuring 3.5 x 3.1 x 2.6 cm within the midportion of the right kidney.  The left kidney measures 10.8 x 7.0 x 6.3 cm. There is an inferior 2.9  cm left renal cyst. There is no solid appearing renal mass. No  obstructing intrarenal stones. No abnormal perinephric fluid collection.     The bladder is only mild to moderately distended. Small bladder wall  diverticula suspected. Mild bladder wall thickening may be due to  underdistention or muscle wall hypertrophy.       Impression:      1. Bilateral renal cysts. No solid renal mass or hydronephrosis.  2. Mild bladder wall thickening may be due to incomplete distention,  however muscle wall hypertrophy or possibly cystitis considered. Small  bladder wall diverticula.  This report was finalized on 09/16/2022 09:02 by Dr. Janeth Platt MD.            Assessment &  Plan     Patient Active Problem List   Diagnosis Code   • CAD (coronary artery disease) I25.10   • HTN (hypertension) I10   • Bilateral lower extremity edema R60.0   • Acute pulmonary edema (CMS/HCC) due to atrial fibrillation J81.0   • Paroxysmal atrial fibrillation (HCC) I48.0   • Mixed hyperlipidemia E78.2   • Anticoagulated Z79.01   • Upper GI hemorrhage K92.2       1. GI bleed with melena  2. Blood loss anemia  3. Coumadin INR yesterday was 2.22 will recheck    Continue PPI gtt and following H/H transfuse as needed. Endoscopy when INR 1.5 or less.   Coumadin is on hold      EMR Dragon/transcription disclaimer: Much of this encounter note is electronic transcription/translation of spoken language to printed text. The electronic translation of spoken language may be erroneous, or at times, nonsensical words or phrases may be inadvertently transcribed. Although I have reviewed the note for such errors, some may still exist.    Janeth Sultana, VIVIEN  09/16/22  09:45 CDT

## 2022-09-17 NOTE — PLAN OF CARE
Goal Outcome Evaluation:  Plan of Care Reviewed With: patient        Progress: no change  Outcome Evaluation: A&OX4, VSS. No c/o pain. Hgb stable at 7.7, PT/INR still increased, await for EGD until INR decreases per Gastro. Assist x stand-by to ambulate, voiding w/o difficulty. No BM this shift. IVF and Protonix gtt continued. On room air and ,  NSR/Sinus Puneet at times on telemetry. Liquid diet remains. Alarms set, call light in reach, safety maintained and continue to monitor.

## 2022-09-17 NOTE — PROGRESS NOTES
[facility] Medicine Services  INPATIENT PROGRESS NOTE    Patient Name: Mickey Conde  Date of Admission: 9/14/2022  Today's Date: 09/17/22  Length of Stay: 3  Primary Care Physician: Ki Tamez MD    Subjective   Chief Complaint: Follow-up  HPI   Color of stool lightening up  No dizziness or lightheadedness  Reports been walking  Express boredom    Tolerating clear liquid diet better today.  He said he feels fullness with this diet compared yesterday.      Review of Systems     All pertinent negatives and positives are as above. All other systems have been reviewed and are negative unless otherwise stated.     Objective    Temp:  [98.1 °F (36.7 °C)-99 °F (37.2 °C)] 98.2 °F (36.8 °C)  Heart Rate:  [50-65] 51  Resp:  [16-18] 16  BP: (105-148)/(40-50) 131/50  Physical Exam  No distress  Laying comfortably in bed  Several missing teeth  GEN: Awake, alert, interactive, in NAD  HEENT: Atraumatic, PERRLA, EOMI, Anicteric, Trachea midline  Lungs: CTAB, no wheezing/rales/rhonchi  Heart: RRR, +S1/s2, no rub  ABD: soft, nt/nd, +BS, no guarding/rebound  Extremities: atraumatic, no cyanosis, no edema  Skin: no rashes or lesions  Neuro: AAOx3, no focal deficits  Psych: normal mood & affect      Results Review:  I have reviewed the labs, radiology results, and diagnostic studies.    Laboratory Data:   Results from last 7 days   Lab Units 09/17/22  0638 09/16/22  2335 09/16/22  1523 09/14/22  1531 09/14/22  0915   WBC 10*3/mm3  --   --   --   --  14.66*   HEMOGLOBIN g/dL 7.7* 7.5* 8.0*   < > 12.7*   HEMATOCRIT % 23.3* 23.1* 24.3*   < > 38.0   PLATELETS 10*3/mm3  --   --   --   --  260    < > = values in this interval not displayed.        Results from last 7 days   Lab Units 09/17/22  0638 09/16/22  0438 09/15/22  0210 09/14/22  0915   SODIUM mmol/L 146* 147* 149* 145   POTASSIUM mmol/L 3.7 3.7 3.5 3.6   CHLORIDE mmol/L 114* 116* 114* 104   CO2 mmol/L 25.0 23.0 25.0 26.0   BUN mg/dL 36*  66* 109* 115*   CREATININE mg/dL 1.51* 1.80* 2.11* 2.07*   CALCIUM mg/dL 8.0* 8.2* 8.5* 9.6   BILIRUBIN mg/dL  --   --   --  0.5   ALK PHOS U/L  --   --   --  42   ALT (SGPT) U/L  --   --   --  19   AST (SGOT) U/L  --   --   --  19   GLUCOSE mg/dL 109* 104* 109* 242*       Culture Data:   Blood Culture   Date Value Ref Range Status   09/14/2022 No growth at 3 days  Preliminary   09/14/2022 No growth at 3 days  Preliminary       Radiology Data:   Imaging Results (Last 24 Hours)     ** No results found for the last 24 hours. **          I have reviewed the patient's current medications.     Assessment/Plan     Active Hospital Problems    Diagnosis    • Upper GI hemorrhage      Problem list  · Melena  · Acute kidney injury  · Suspected upper GI bleed possibly peptic ulcer disease underlying  · Coagulopathy.  Previously on Coumadin.  This has been on hold.  · Incidental finding of infrarenal saccular aneurysm  · Incidental finding of cholelithiasis  · Hypertension-stable.  Continue present management  · Lactic acidosis probably from transient hypotension.   ·  Hyperglycemia  · Tremors  · Mildly elevated troponin in the setting of acute kidney injury.  No reported chest pain.   flat trend troponin. EKG showed sinus rhythm with first-degree AV block, PAC and left bundle branch block. Wide QRS complex, left bundle branch block on earlier EKG.Similar findings from prior EKG with exception that the heart rate was slower at 57 (July 2022)  · Chronic left bundle branch block  · Cardiomyopathy with EF 41 to 45% and LV diastolic dysfunction.  No evidence of acute decompensation.  · Leukocytosis-no gross source of infection.  Possibly stress reaction.  Monitor closely  · hypernatremia improving     Plan:  Hemoglobin so far appears to have stabilized at 7.7  Creatinine improving  Avoid nephrotoxic medication.  Avoiding hydrochlorothiazide, Lasix at current time  Monitor H&H for bleeding; transfuse if hemoglobin less than 7 or  less than 8 with active bleeding    Monitor for active bleeding  Transfuse as needed for symptomatic anemia and/or active bleeding or hemoglobin less than 7  Hemodynamically stable  Patient been of Coumadin yesterday INR still at the therapeutic range (2.16).  Remains elevated, anticipate using vitamin K by tomorrow as discussed with Dr. Gudino.  BP improved-resume ACE inhibitor but at a lower dose with close monitoring of renal function.  Patient is followed in outpatient by nurse practitioner Briseyda with cardiology group.      atorvastatin, 20 mg, Oral, Daily  metoprolol tartrate, 25 mg, Oral, BID  sodium chloride, 10 mL, Intravenous, Q12H        Discharge Planning:    Anticipated that his discharge will be sometime early next week.      Electronically signed by Nixon Trotter MD, 09/17/22, 11:49 CDT.

## 2022-09-17 NOTE — PROGRESS NOTES
Inpatient Gastroenterology Service    Follow-up Note    Antony Gudino MD, FACP        Complaints  -no new GI complaints        Physical Exam  Temp:  [97.5 °F (36.4 °C)-99 °F (37.2 °C)] 99 °F (37.2 °C)  Heart Rate:  [50-65] 50  Resp:  [16-18] 16  BP: (105-148)/(40-45) 121/45  -GI component unchanged        Laboratory of Note  -see EMR   Latest Reference Range & Units 09/16/22 10:24 09/16/22 15:23 09/16/22 23:35 09/17/22 06:38   Hemoglobin 13.0 - 17.7 g/dL 9.2 (L) 8.0 (L) 7.5 (L) 7.7 (L)   Hematocrit 37.5 - 51.0 % 28.0 (L) 24.3 (L) 23.1 (L) 23.3 (L)   (L): Data is abnormally low     Latest Reference Range & Units 09/14/22 09:15 09/15/22 13:35 09/16/22 10:24 09/17/22 06:38   Protime 11.9 - 14.6 Seconds 18.7 (H) 23.8 (H) 23.0 (H) 23.2 (H)   INR 0.91 - 1.09  1.63 (H) 2.22 (H) 2.13 (H) 2.16 (H)   (H): Data is abnormally high        Imaging of Note  -see EMR          Assessment  -melena with laboratory findings consistent with upper GI diathesis.  The patient is on warfarin, and takes large doses of aleve.  INR: return towards normal has been gradual, as is often the case.           Recommendations  -acid suppression: IV PPi  -hold heparin, warfarin, DAOAC, aspirin, Plavix, etc. as clinically able  -avoid agents toxic to GI tract mucosa, anticoagulant/antiplatelet agents, as clinically practicable  -would not start sucralfate at this time, as it may make mucosal visualization more difficult should the patient develop signs of active bleeding necessitating urgent endoscopy  -follow H&H, transfuse as clinically appropriate, including FFP and platelets as appropriate  -clear liquid diet  -will plan EGD once INR is down to acceptable level, unless clinical course dictates otherwise  -please see endoscopy report(s) to follow for findings and further recommendations            Thank you for allowing us to participate in the care of this patient.    Sincerely,    SMOOTH Gudino MD, FACP

## 2022-09-18 NOTE — PROGRESS NOTES
[facility] Medicine Services  INPATIENT PROGRESS NOTE    Patient Name: Mickey Conde  Date of Admission: 9/14/2022  Today's Date: 09/18/22  Length of Stay: 4  Primary Care Physician: Ki Tamez MD    Subjective   Chief Complaint: Follow-up  HPI     No bowel movement today  Had a bowel movement yesterday dark-colored.  No shortness of breath or difficulty breathing          Review of Systems   No fever or chills  No shortness of breath  No difficulty breathing  No abdominal pain  All pertinent negatives and positives are as above. All other systems have been reviewed and are negative unless otherwise stated.     Objective    Temp:  [97.4 °F (36.3 °C)-98.9 °F (37.2 °C)] 98 °F (36.7 °C)  Heart Rate:  [54-82] 59  Resp:  [16-18] 18  BP: (120-158)/(49-68) 158/68  Physical Exam  Seated comfortably on recliner  Several missing teeth  GEN: Awake, alert, interactive, in NAD  HEENT: Atraumatic, PERRLA, EOMI, Anicteric, Trachea midline  Lungs: CTAB, no wheezing/rales/rhonchi  Heart: RRR, +S1/s2, no rub  ABD: soft, nt/nd, +BS, no guarding/rebound  Extremities: atraumatic, no cyanosis, no edema  Skin: no rashes or lesions  Neuro: AAOx3, no focal deficits  Psych: normal mood & affect       Results Review:  I have reviewed the labs, radiology results, and diagnostic studies.    Laboratory Data:   Results from last 7 days   Lab Units 09/18/22  0804 09/17/22  1943 09/17/22  0638 09/14/22  1531 09/14/22  0915   WBC 10*3/mm3  --   --   --   --  14.66*   HEMOGLOBIN g/dL 8.2* 7.7* 7.7*   < > 12.7*   HEMATOCRIT % 24.3* 22.8* 23.3*   < > 38.0   PLATELETS 10*3/mm3  --   --   --   --  260    < > = values in this interval not displayed.        Results from last 7 days   Lab Units 09/18/22  0439 09/17/22  0638 09/16/22  0438 09/15/22  0210 09/14/22  0915   SODIUM mmol/L 143 146* 147*   < > 145   POTASSIUM mmol/L 3.5 3.7 3.7   < > 3.6   CHLORIDE mmol/L 112* 114* 116*   < > 104   CO2 mmol/L 24.0 25.0  23.0   < > 26.0   BUN mg/dL 22 36* 66*   < > 115*   CREATININE mg/dL 1.41* 1.51* 1.80*   < > 2.07*   CALCIUM mg/dL 8.1* 8.0* 8.2*   < > 9.6   BILIRUBIN mg/dL  --   --   --   --  0.5   ALK PHOS U/L  --   --   --   --  42   ALT (SGPT) U/L  --   --   --   --  19   AST (SGOT) U/L  --   --   --   --  19   GLUCOSE mg/dL 101* 109* 104*   < > 242*    < > = values in this interval not displayed.       Culture Data:   Blood Culture   Date Value Ref Range Status   09/14/2022 No growth at 4 days  Preliminary   09/14/2022 No growth at 4 days  Preliminary       Radiology Data:   Imaging Results (Last 24 Hours)     ** No results found for the last 24 hours. **          I have reviewed the patient's current medications.     Assessment/Plan     Active Hospital Problems    Diagnosis    • Upper GI hemorrhage      Problem list  · Melena (hemoglobin now stabilizing)  · Acute kidney injury (improving)  · Suspected upper GI bleed possibly peptic ulcer disease underlying  · Coagulopathy.  Previously on Coumadin.  This has been on hold.  · Incidental finding of infrarenal saccular aneurysm  · Incidental finding of cholelithiasis  · Hypertension-stable.  Continue present management  · Lactic acidosis probably from transient hypotension.   ·  Hyperglycemia-improved.  · Tremors this is not as pronounced.  · Mildly elevated troponin in the setting of acute kidney injury.  No reported chest pain.   flat trend troponin. EKG showed sinus rhythm with first-degree AV block, PAC and left bundle branch block. Wide QRS complex, left bundle branch block on earlier EKG.Similar findings from prior EKG with exception that the heart rate was slower at 57 (July 2022)  · Chronic left bundle branch block  · Cardiomyopathy with EF 41 to 45% and LV diastolic dysfunction.  No evidence of acute decompensation.  · Leukocytosis-no gross source of infection.  Possibly stress reaction.  Monitor closely  · hypernatremia improving    Plan:  Been off to Coumadin but yet  INR still elevated.  I added vitamin K x1 today.  Discussed with pharmacist Edward.  Normally 6 to 10 hours would be time to expect to see improvement.  I will repeat INR at 2 AM.  If remain elevated at that time, will give another 5 mg and repeat at 8 AM      Monitor for active bleeding  Transfuse as needed for symptomatic anemia and/or active bleeding or hemoglobin less than 7.     Monitor blood pressure.  Lisinopril started yesterday since there is improvement in creatinine.  Continue beta-blocker.  Patient's heart rate in the 50s to low 60s.  Anticipate can increase lisinopril if renal function continues to improve    Continue PPI drip  Continue IV fluid.  No signs of congestion.  Apprised son of plan of care.  Discharge Planning:   I am hoping INR with improved with vitamin K by tomorrow for him to be scoped.  I will place patient n.p.o. after midnight for possible scope if INR is acceptable to GI.    Electronically signed by Nixon Trotter MD, 09/18/22, 16:46 CDT.

## 2022-09-18 NOTE — PLAN OF CARE
Goal Outcome Evaluation:  Plan of Care Reviewed With: patient        Progress: no change  Outcome Evaluation: A&OX4, VSS. No c/o pain, no BM this shift, no signs of active bleeding noted. Stand-by assist to ambulate, voiding w/o difficulty. EGD still on hold per Gastro d/t INR still being elevated. On room air and , NSR/Sinus Puneet on telemetry at times. SCD's for VTE prevention. Fall risk/Alarms set, call light in reach. Safety maintained and continue to monitor.

## 2022-09-18 NOTE — PROGRESS NOTES
Inpatient Gastroenterology Service    Follow-up Note    Antony Gudino MD, FACP        Complaints  -no new GI complaints        Physical Exam  Temp:  [97.4 °F (36.3 °C)-98.9 °F (37.2 °C)] 97.4 °F (36.3 °C)  Heart Rate:  [54-82] 63  Resp:  [16-18] 18  BP: (120-155)/(49-62) 148/62  -GI component unchanged        Laboratory of Note  -see EMR   Latest Reference Range & Units 09/15/22 13:35 09/16/22 10:24 09/17/22 06:38 09/18/22 04:39   Protime 11.9 - 14.6 Seconds 23.8 (H) 23.0 (H) 23.2 (H) 22.0 (H)   INR 0.91 - 1.09  2.22 (H) 2.13 (H) 2.16 (H) 2.01 (H)   (H): Data is abnormally high     Latest Reference Range & Units 09/16/22 23:35 09/17/22 06:38 09/17/22 19:43 09/18/22 08:04   Hemoglobin 13.0 - 17.7 g/dL 7.5 (L) 7.7 (L) 7.7 (L) 8.2 (L)   Hematocrit 37.5 - 51.0 % 23.1 (L) 23.3 (L) 22.8 (L) 24.3 (L)   (L): Data is abnormally low        Imaging of Note  -see EMR  -no new          Assessment  -melena with laboratory findings consistent with upper GI diathesis.  The patient is on warfarin, and takes large doses of aleve.  INR: return towards normal continues to be gradual.           Recommendations  -continue acid suppression: IV PPi  -hold heparin, warfarin, DAOAC, aspirin, Plavix, etc. as clinically able  -avoid agents toxic to GI tract mucosa, anticoagulant/antiplatelet agents, as clinically practicable  -would not start sucralfate at this time, as it may make mucosal visualization more difficult should the patient develop signs of active bleeding necessitating urgent endoscopy  -follow H&H, transfuse as clinically appropriate, including FFP and platelets as appropriate  -clear liquid diet  -will plan EGD once INR is down to acceptable level, unless clinical course dictates otherwise  -please see endoscopy report(s) to follow for findings and further recommendations         Thank you for allowing us to participate in the care of this patient.    Sincerely,    SMOOTH Gudino MD, FACP

## 2022-09-18 NOTE — PLAN OF CARE
Goal Outcome Evaluation:  Plan of Care Reviewed With: patient        Progress: no change  Outcome Evaluation: VSS. No c/o pain this shift. Safety maintained. Sign active bleeding noted. Awaiting am labs

## 2022-09-19 PROBLEM — N17.9 AKI (ACUTE KIDNEY INJURY) (HCC): Status: ACTIVE | Noted: 2022-01-01

## 2022-09-19 PROBLEM — D62 ACUTE BLOOD LOSS ANEMIA: Status: ACTIVE | Noted: 2022-01-01

## 2022-09-19 PROBLEM — I50.42 CHRONIC COMBINED SYSTOLIC AND DIASTOLIC CHF (CONGESTIVE HEART FAILURE) (HCC): Status: ACTIVE | Noted: 2022-01-01

## 2022-09-19 PROBLEM — I42.9 CARDIOMYOPATHY (HCC): Status: ACTIVE | Noted: 2022-01-01

## 2022-09-19 NOTE — PLAN OF CARE
Goal Outcome Evaluation:  Plan of Care Reviewed With: patient        Progress: no change  Outcome Evaluation: Pt has been on NPO/clear liquids since admission 9/14 pending endoscopy for GI bleed. Waiting for INR to decrease. Plans for endo 9/19. To receiving clear liquids today and be NPO after midnight. Will cont to follow for nutrition needs and POC.

## 2022-09-19 NOTE — PLAN OF CARE
Goal Outcome Evaluation:  Plan of Care Reviewed With: patient        Progress: no change  Outcome Evaluation: No signs of active bleeding this shift. VSS. No c/o pain. Protonix gtt continued. INR 1.75 this am, to receive second dose of Vit K, when received from pharm. Safety maintained. NPO for EGD today

## 2022-09-19 NOTE — PLAN OF CARE
Goal Outcome Evaluation:  Plan of Care Reviewed With: patient           Outcome Evaluation: Pt. up resting in chair. INR 1.75 endoscopy postponed. Pt. on clear liquid diet and strict NPO at midnight. No BM this shift. No complaint of pain. Ambulated in hallway. IVF discontinued. Protonix drip discontinued. Alert x4. Voiding. VSS. Safety maintained.

## 2022-09-19 NOTE — CASE MANAGEMENT/SOCIAL WORK
Continued Stay Note  NA[facility] [place]     Patient Name: Mickey Conde  MRN: [id number]  Today's Date: 9/19/2022    Admit Date: 9/14/2022     Discharge Plan     Row Name 09/19/22 1037       Plan    Plan Home    Patient/Family in Agreement with Plan yes    Plan Comments Pt is up ad connie. He will go home at d/c. Pt has denied needs.               Discharge Codes    No documentation.               Expected Discharge Date and Time     Expected Discharge Date Expected Discharge Time    Sep 21, 2022             KISHA Ambriz

## 2022-09-19 NOTE — PROGRESS NOTES
[facility] Gastroenterology  Inpatient Progress Note  Mickey Conde  1935 9/19/2022  Reason for Follow Up:  GI bleed    Subjective     Subjective:   Pt is AAOx3 up in chair. No new complaints. No signs for active bleeding    Current Facility-Administered Medications:   •  acetaminophen (TYLENOL) tablet 650 mg, 650 mg, Oral, Q6H PRN, Nixon Trotter MD  •  atorvastatin (LIPITOR) tablet 20 mg, 20 mg, Oral, Daily, Nixon Trotter MD, 20 mg at 09/19/22 0826  •  lisinopril (PRINIVIL,ZESTRIL) tablet 2.5 mg, 2.5 mg, Oral, Q24H, Nixon Trotter MD, 2.5 mg at 09/19/22 0827  •  metoprolol tartrate (LOPRESSOR) tablet 25 mg, 25 mg, Oral, BID, Nixon Trotter MD, 25 mg at 09/18/22 2026  •  pantoprazole (PROTONIX) 40 mg in 100mL NS IVPB, 8 mg/hr, Intravenous, Continuous, Nixon Trotter MD, Last Rate: 20 mL/hr at 09/19/22 0405, 8 mg/hr at 09/19/22 0405  •  sodium chloride 0.45 % infusion, 50 mL/hr, Intravenous, Continuous, Nixon Trotter MD, Last Rate: 50 mL/hr at 09/19/22 0343, 50 mL/hr at 09/19/22 0343  •  [COMPLETED] Insert peripheral IV, , , Once **AND** sodium chloride 0.9 % flush 10 mL, 10 mL, Intravenous, PRN, Nixon Trotter MD  •  sodium chloride 0.9 % flush 10 mL, 10 mL, Intravenous, Q12H, Nixon Trotter MD, 10 mL at 09/19/22 0827  •  sodium chloride 0.9 % flush 10 mL, 10 mL, Intravenous, PRN, Nixon Trtoter MD    Review of Systems:    Review of Systems   Constitutional: Negative for activity change, appetite change, chills, diaphoresis, fatigue, fever and unexpected weight change.   HENT: Negative for ear pain, hearing loss, mouth sores, sore throat, trouble swallowing and voice change.    Eyes: Negative.    Respiratory: Negative for cough, choking, shortness of breath and wheezing.    Cardiovascular: Negative for chest pain and palpitations.   Gastrointestinal: Negative for abdominal pain, blood in stool,  constipation, diarrhea, nausea and vomiting.   Endocrine: Negative for cold intolerance and heat intolerance.   Genitourinary: Negative for decreased urine volume, dysuria, frequency, hematuria and urgency.   Musculoskeletal: Negative for back pain, gait problem and myalgias.   Skin: Negative for color change, pallor and rash.   Allergic/Immunologic: Negative for food allergies and immunocompromised state.   Neurological: Negative for dizziness, tremors, seizures, syncope, weakness, light-headedness, numbness and headaches.   Hematological: Negative for adenopathy. Does not bruise/bleed easily.   Psychiatric/Behavioral: Negative for agitation and confusion. The patient is not nervous/anxious.    All other systems reviewed and are negative.       Objective     Vital Signs  Temp:  [97.4 °F (36.3 °C)-98.4 °F (36.9 °C)] 98.3 °F (36.8 °C)  Heart Rate:  [52-71] 52  Resp:  [16-18] 16  BP: (130-158)/(55-68) 137/60  Body mass index is 27.31 kg/m².    Intake/Output Summary (Last 24 hours) at 9/19/2022 0850  Last data filed at 9/19/2022 0330  Gross per 24 hour   Intake 580 ml   Output 1350 ml   Net -770 ml     No intake/output data recorded.       Physical Exam:   General: patient awake, alert and cooperative, no acute distress   Eyes: Normal lids and lashes, no scleral icterus   Neck: supple, no JVD   Skin: warm and dry   Cardiovascular: regular rhythm and rate, no murmurs auscultated   Pulm: clear to auscultation bilaterally, regular and unlabored   Abdomen: soft, nontender, nondistended; normal bowel sounds   Psychiatric: Normal mood and behavior; converses appropriately     Results Review:    I have reviewed all of the patients current test results    Results from last 7 days   Lab Units 09/19/22  0816 09/18/22  1900 09/18/22  0804 09/14/22  1531 09/14/22  0915   WBC 10*3/mm3  --   --   --   --  14.66*   HEMOGLOBIN g/dL 7.6* 8.2* 8.2*   < > 12.7*   HEMATOCRIT % 23.1* 24.1* 24.3*   < > 38.0   PLATELETS 10*3/mm3  --   --    --   --  260    < > = values in this interval not displayed.       Results from last 7 days   Lab Units 09/19/22  0816 09/18/22  0439 09/17/22  0638 09/15/22  0210 09/14/22  0915   SODIUM mmol/L 141 143 146*   < > 145   POTASSIUM mmol/L 3.4* 3.5 3.7   < > 3.6   CHLORIDE mmol/L 110* 112* 114*   < > 104   CO2 mmol/L 23.0 24.0 25.0   < > 26.0   BUN mg/dL 14 22 36*   < > 115*   CREATININE mg/dL 1.39* 1.41* 1.51*   < > 2.07*   CALCIUM mg/dL 8.1* 8.1* 8.0*   < > 9.6   BILIRUBIN mg/dL  --   --   --   --  0.5   ALK PHOS U/L  --   --   --   --  42   ALT (SGPT) U/L  --   --   --   --  19   AST (SGOT) U/L  --   --   --   --  19   GLUCOSE mg/dL 100* 101* 109*   < > 242*    < > = values in this interval not displayed.       Results from last 7 days   Lab Units 09/19/22  0127 09/18/22  1652 09/18/22  0439   INR  1.75* 2.08* 2.01*       Lab Results   Lab Value Date/Time    LIPASE 147 04/18/2018 0528       Radiology:    Imaging Results (Last 24 Hours)     ** No results found for the last 24 hours. **            Assessment & Plan     Patient Active Problem List   Diagnosis Code   • CAD (coronary artery disease) I25.10   • HTN (hypertension) I10   • Bilateral lower extremity edema R60.0   • Acute pulmonary edema (CMS/HCC) due to atrial fibrillation J81.0   • Paroxysmal atrial fibrillation (HCC) I48.0   • Mixed hyperlipidemia E78.2   • Anticoagulated Z79.01   • Upper GI hemorrhage K92.2       1. GI bleed  2. Blood loss anemia  3. Coumadin prolonged INR    Endoscopy pending; waiting for INR to decline 1.5 desired.   Following H/H transfuse as needed      EMR Dragon/transcription disclaimer: Much of this encounter note is electronic transcription/translation of spoken language to printed text. The electronic translation of spoken language may be erroneous, or at times, nonsensical words or phrases may be inadvertently transcribed. Although I have reviewed the note for such errors, some may still exist.    Janeth Sultana,  VIVIEN  09/19/22  08:50 CDT

## 2022-09-19 NOTE — PROGRESS NOTES
[facility] Medicine Services  INPATIENT PROGRESS NOTE    Patient Name: Mickey Conde  Date of Admission: 9/14/2022  Today's Date: 09/19/22  Length of Stay: 5  Primary Care Physician: Ki Tamez MD    Subjective   Chief Complaint: f/u GIB    HPI   Patient resting comfortably in chair at bedside.  Denies chest pain or shortness of breath.  No nausea.  No active bleeding seen today.      Review of Systems   All pertinent negatives and positives are as above. All other systems have been reviewed and are negative unless otherwise stated.     Objective    Temp:  [97.5 °F (36.4 °C)-98.4 °F (36.9 °C)] 97.5 °F (36.4 °C)  Heart Rate:  [52-71] 63  Resp:  [16-18] 16  BP: (130-158)/(55-68) 143/66  Physical Exam  GEN: Awake, alert, interactive, in NAD  HEENT:  PERRLA, EOMI, Anicteric, Trachea midline  Lungs:  no wheezing/rales/rhonchi  Heart: RRR, +S1/s2, no rub  ABD: soft, nt/nd, +BS, no guarding/rebound  Extremities: atraumatic, no cyanosis, trace edema in all extremities  Skin: no rashes or petechiae   Neuro: AAOx3, no focal deficits  Psych: normal mood & affect        Results Review:  I have reviewed the labs, radiology results, and diagnostic studies.    Laboratory Data:   Results from last 7 days   Lab Units 09/19/22  0816 09/18/22  1900 09/18/22  0804 09/14/22  1531 09/14/22  0915   WBC 10*3/mm3  --   --   --   --  14.66*   HEMOGLOBIN g/dL 7.6* 8.2* 8.2*   < > 12.7*   HEMATOCRIT % 23.1* 24.1* 24.3*   < > 38.0   PLATELETS 10*3/mm3  --   --   --   --  260    < > = values in this interval not displayed.        Results from last 7 days   Lab Units 09/19/22  0816 09/18/22  0439 09/17/22  0638 09/15/22  0210 09/14/22  0915   SODIUM mmol/L 141 143 146*   < > 145   POTASSIUM mmol/L 3.4* 3.5 3.7   < > 3.6   CHLORIDE mmol/L 110* 112* 114*   < > 104   CO2 mmol/L 23.0 24.0 25.0   < > 26.0   BUN mg/dL 14 22 36*   < > 115*   CREATININE mg/dL 1.39* 1.41* 1.51*   < > 2.07*   CALCIUM mg/dL 8.1*  8.1* 8.0*   < > 9.6   BILIRUBIN mg/dL  --   --   --   --  0.5   ALK PHOS U/L  --   --   --   --  42   ALT (SGPT) U/L  --   --   --   --  19   AST (SGOT) U/L  --   --   --   --  19   GLUCOSE mg/dL 100* 101* 109*   < > 242*    < > = values in this interval not displayed.       Culture Data:   Blood Culture   Date Value Ref Range Status   09/14/2022 No growth at 5 days  Final   09/14/2022 No growth at 5 days  Final       Radiology Data:   Imaging Results (Last 24 Hours)     ** No results found for the last 24 hours. **          I have reviewed the patient's current medications.     Assessment/Plan     Active Hospital Problems    Diagnosis    • **Upper GI hemorrhage    • STAN (acute kidney injury) (HCC)    • Acute blood loss anemia    • Chronic combined systolic and diastolic CHF (congestive heart failure) (HCC)    • Paroxysmal atrial fibrillation (HCC)    • CAD (coronary artery disease)    • HTN (hypertension)        Plan:  #1 upper GI bleed -in the setting of Coumadin with a history of P A. fib.  Suspecting PUD.  Awaiting EGD.  INR still above desired goal.  Was given vitamin K.  GI following.  We will transition Protonix drip to PPI pushes twice daily to help with volume.  Patient started to get swelling peripherally.  Has been on drip since 9/14.    #2 acute blood loss anemia -secondary to #1.  Monitor for any signs of cardiac distress or transfusion needs.  Keep greater than or equal to 7.  Transfuse greater than 8 if having chest discomfort or cardiac symptoms.    #3 chronic combined CHF -echo on 9/15 shows an EF of 41 to 45% with grade 1 diastolic dysfunction.  He has not had any signs of acute on chronic heart failure since arrival but is starting to become somewhat edematous peripherally with resuscitation.  DC IV fluids.  Continue beta-blocker and lisinopril.  Monitor for diuretic needs.    #4 paroxysmal A. Fib -sinus on arrival.  Coumadin on hold due to GI bleed.  Was given vitamin K to reverse INR for scope  by GI providers.    #5 CAD -no active chest pain.  Monitor given anemia and bleed.  Continue beta-blocker and statin.    #6 hypertension -blood pressure stable to mildly elevated.  Monitor and adjust meds as needed.  Allowing some mild hypertension given risk for GI bleed and decompensation.    #7 hypokalemia -replace    #8 STAN -overall creatinine has improved from arrival.  Was 2.11 and now down to 1.39.  Renal ultrasound shows no obstruction or hydronephrosis.  Continue to trend labs.  Avoid nephrotoxic meds.      Discharge Planning: Ongoing.  Plan for EGD hopefully tomorrow if labs better.  Will need EGD results for dispo.  Potentially in the next 24 to 48 hours pending EGD results.    Electronically signed by Celestine Ramirez DO, 09/19/22, 12:19 CDT.

## 2022-09-20 NOTE — H&P
Pre Procedure History & Physical    Chief Complaint:   Melena.  Upper GI bleed    Subjective     HPI:   87-year-old gentleman with history of Coumadin for A. fib with hemodynamically stable upper GI bleed probably secondary to NSAID induced gastropathy's/peptic ulcer disease    Past Medical History:   Past Medical History:   Diagnosis Date   • Benign essential hypertension    • CAD (coronary artery disease)    • CAD in native artery     Story: lexiscan 1/2012 negative for ischemia   • Degeneration of lumbar or lumbosacral intervertebral disc 11/13/2019   • Enlarged prostate    • History of coronary artery stent placement 2/11/2019   • HTN (hypertension) 1/26/2017   • Hyperlipemia, mixed     Lipid panel (10/2014) 143/48/81/189   • Hyperlipidemia    • Hypertension    • Myocardial infarct (HCC) 1/26/2017   • Myocardial infarction (HCC)        Past Surgical History:  Past Surgical History:   Procedure Laterality Date   • BACK SURGERY     • CAROTID STENT     • LEG SURGERY      a alberto was put in.  pt broke his leg   • LUMBAR LAMINECTOMY Left 12/31/2019    Procedure: LEFT LUMBAR HEMILAMINECTOMY WITHOUT FUSION AT LUMBAR 3/4 AND 4/5;  Surgeon: Stalin Williamson MD;  Location: [facility];  Service: Neurosurgery   • ROTATOR CUFF REPAIR Bilateral    • SHOULDER SURGERY     • TENNIS ELBOW RELEASE         Family History:  Family History   Problem Relation Age of Onset   • ALS Mother    • Heart attack Father    • Alzheimer's disease Maternal Grandfather    • Heart attack Maternal Grandfather    • Heart attack Paternal Grandfather        Social History:   reports that he quit smoking about 32 years ago. His smoking use included cigarettes. He started smoking about 66 years ago. He has quit using smokeless tobacco. He reports that he does not drink alcohol and does not use drugs.    Medications:   Medications Prior to Admission   Medication Sig Dispense Refill Last Dose   • acetaminophen (TYLENOL) 325 MG tablet Take 650 mg by  "mouth Every 6 (Six) Hours As Needed for Mild Pain .      • furosemide (LASIX) 40 MG tablet Take 40 mg by mouth Every Morning.      • hydroCHLOROthiazide (HYDRODIURIL) 25 MG tablet Take 1 tablet by mouth Daily. 90 tablet 3    • lisinopril (PRINIVIL,ZESTRIL) 30 MG tablet Take 1 tablet by mouth Daily. 90 tablet 3    • metoprolol tartrate (LOPRESSOR) 25 MG tablet Take 2 tablets by mouth 2 (Two) Times a Day. 180 tablet 3    • Misc Natural Products (OSTEO BI-FLEX ADV DOUBLE ST PO) Take 1 tablet by mouth Daily.      • Sennosides-Docusate Sodium (STOOL SOFTENER & LAXATIVE PO) Take  by mouth 2 (Two) Times a Day As Needed.      • simvastatin (ZOCOR) 40 MG tablet Take 40 mg by mouth Every Night.      • warfarin (COUMADIN) 5 MG tablet Take 5 mg by mouth Every Night. 1 t by mouth Monday, Wednesday, and Friday. 1/2 t by mouth Tuesday, Thursday, Saturday, and Sunday          Allergies:  Penicillins        Objective     Blood pressure 151/51, pulse 60, temperature 98 °F (36.7 °C), temperature source Oral, resp. rate 13, height 170.2 cm (67\"), weight 79.1 kg (174 lb 6.4 oz), SpO2 95 %.    Physical Exam   Constitutional: Pt is oriented to person, place, and time and well-developed, well-nourished, and in no distress.   Mouth/Throat: Oropharynx is clear and moist.   Neck: Normal range of motion.   Cardiovascular: Normal rate, regular rhythm and normal heart sounds.    Pulmonary/Chest: Effort normal and breath sounds normal.   Abdominal: Soft. Nontender  Skin: Skin is warm and dry.   Psychiatric: Mood, memory, affect and judgment normal.     Assessment & Plan     Diagnosis:  Upper GI bleed    Anticipated Surgical Procedure:  EGD for further evaluation    The risks, benefits, and alternatives of this procedure have been discussed with the patient or the responsible party- the patient understands and agrees to proceed.          "

## 2022-09-20 NOTE — ANESTHESIA POSTPROCEDURE EVALUATION
Patient: Mickey Conde    Procedure Summary     Date: 09/20/22 Room / Location: [facility] ENDOSCOPY 2 / [facility] ENDOSCOPY    Anesthesia Start: 1208 Anesthesia Stop: 1218    Procedure: ESOPHAGOGASTRODUODENOSCOPY WITH ANESTHESIA (N/A ) Diagnosis:       Acute blood loss anemia      (Acute blood loss anemia [D62])    Surgeons: Agustín Beauchamp MD Provider: Michael Brown CRNA    Anesthesia Type: MAC ASA Status: 3 - Emergent          Anesthesia Type: MAC    Vitals  Vitals Value Taken Time   BP     Temp     Pulse 55 09/20/22 1218   Resp     SpO2 92 % 09/20/22 1218   Vitals shown include unvalidated device data.        Post Anesthesia Care and Evaluation    Patient location during evaluation: PHASE II  Patient participation: complete - patient participated  Level of consciousness: awake and alert  Pain score: 0    Airway patency: patent  Anesthetic complications: No anesthetic complications  PONV Status: none  Cardiovascular status: acceptable  Respiratory status: acceptable  Hydration status: acceptable    Comments: .v  No anesthesia care post op

## 2022-09-20 NOTE — PROGRESS NOTES
[facility] Medicine Services  INPATIENT PROGRESS NOTE    Patient Name: Mickey Conde  Date of Admission: 9/14/2022  Today's Date: 09/20/22  Length of Stay: 6  Primary Care Physician: Ki Tamez MD    Subjective   Chief Complaint: f/u GIB    HPI   Patient seen resting in bed after EGD.  He feels well.  No complaints.  Denies any bleeding.  No nausea or vomiting.  No shortness of breath.  Does complain of some chest discomfort with exertion which is brief and limited.  No dizziness, diaphoresis.      Review of Systems   All pertinent negatives and positives are as above. All other systems have been reviewed and are negative unless otherwise stated.     Objective    Temp:  [97.4 °F (36.3 °C)-98.7 °F (37.1 °C)] 98 °F (36.7 °C)  Heart Rate:  [51-63] 51  Resp:  [12-21] 18  BP: (102-159)/(46-67) 151/61  Physical Exam  GEN: Awake, alert, interactive, in NAD  HEENT:  PERRLA, EOMI, Anicteric, Trachea midline  Lungs:  no wheezing/rales/rhonchi  Heart: RRR, +S1/s2, no rub  ABD: soft, nt/nd, +BS, no guarding/rebound  Extremities: atraumatic, no cyanosis, trace edema in all extremities  Skin: no rashes or petechiae   Neuro: AAOx3, no focal deficits  Psych: normal mood & affect        Results Review:  I have reviewed the labs, radiology results, and diagnostic studies.    Laboratory Data:   Results from last 7 days   Lab Units 09/20/22  0834 09/19/22  1929 09/19/22  0816 09/14/22  1531 09/14/22  0915   WBC 10*3/mm3 6.45  --   --   --  14.66*   HEMOGLOBIN g/dL 7.5*  7.5* 7.6* 7.6*   < > 12.7*   HEMATOCRIT % 23.1*  23.1* 22.3* 23.1*   < > 38.0   PLATELETS 10*3/mm3 176  --   --   --  260    < > = values in this interval not displayed.        Results from last 7 days   Lab Units 09/20/22  0834 09/19/22  0816 09/18/22  0439 09/15/22  0210 09/14/22  0915   SODIUM mmol/L 141 141 143   < > 145   POTASSIUM mmol/L 3.7 3.4* 3.5   < > 3.6   CHLORIDE mmol/L 109* 110* 112*   < > 104   CO2 mmol/L  22.0 23.0 24.0   < > 26.0   BUN mg/dL 12 14 22   < > 115*   CREATININE mg/dL 1.42* 1.39* 1.41*   < > 2.07*   CALCIUM mg/dL 8.1* 8.1* 8.1*   < > 9.6   BILIRUBIN mg/dL  --   --   --   --  0.5   ALK PHOS U/L  --   --   --   --  42   ALT (SGPT) U/L  --   --   --   --  19   AST (SGOT) U/L  --   --   --   --  19   GLUCOSE mg/dL 92 100* 101*   < > 242*    < > = values in this interval not displayed.       Culture Data:   Blood Culture   Date Value Ref Range Status   09/14/2022 No growth at 5 days  Final   09/14/2022 No growth at 5 days  Final       Radiology Data:   Imaging Results (Last 24 Hours)     ** No results found for the last 24 hours. **          I have reviewed the patient's current medications.     Assessment/Plan     Active Hospital Problems    Diagnosis    • **Upper GI hemorrhage    • STAN (acute kidney injury) ([facility])    • Acute blood loss anemia    • Chronic combined systolic and diastolic CHF (congestive heart failure) ([facility])    • Paroxysmal atrial fibrillation (HCC)    • CAD (coronary artery disease)    • HTN (hypertension)        Plan:  #1 upper GI bleed -in the setting of Coumadin with a history of P A. fib.  Status post EGD today with no obvious signs of cause of his bleeding.  He has been put back on chronically diet with plans to go for colonoscopy tomorrow post prep.  Continue PPI daily.  Monitor for any bleeding.    #2 acute blood loss anemia -secondary to #1.  Monitor for any signs of cardiac distress or transfusion needs.  He is complaining of some chest discomfort with exertion today.  He does have a history of cardiac stents many years ago.  We will give 1 unit PRBC.  Keep greater than or equal to 7.  Transfuse greater than 8 if having chest discomfort or cardiac symptoms.    #3 chronic combined CHF -echo on 9/15 shows an EF of 41 to 45% with grade 1 diastolic dysfunction.  He has not had any signs of acute on chronic heart failure since arrival but is starting to become somewhat edematous  peripherally with resuscitation.  DC IV fluids.  Continue beta-blocker and lisinopril.  Monitor for diuretic needs.    #4 paroxysmal A. Fib -sinus on arrival.  Coumadin on hold due to GI bleed.  Was given vitamin K to reverse INR for scope by GI providers.    #5 CAD -no active chest pain at rest.  As above we will give 1 unit PRBC due to prior history.  Continue beta-blocker and statin.    #6 hypertension -blood pressure stable to mildly elevated.  Monitor and adjust meds as needed.  Allowing some mild hypertension given risk for GI bleed and decompensation.    #7 hypokalemia -improved    #8 STAN -overall creatinine has improved/stabilized from arrival.  Was 2.11 and now down to 1.4.  Renal ultrasound shows no obstruction or hydronephrosis.  Continue to trend labs.  Avoid nephrotoxic meds.      Discharge Planning: Ongoing.  Status post EGD today.  Plans for colonoscopy tomorrow.  We will follow-up.  Hopefully home soon if stable and no bleeding.    Electronically signed by Celestine Ramirez DO, 09/20/22, 14:05 CDT.

## 2022-09-20 NOTE — ANESTHESIA PREPROCEDURE EVALUATION
Anesthesia Evaluation     Patient summary reviewed and Nursing notes reviewed   no history of anesthetic complications:  NPO Solid Status: > 8 hours  NPO Liquid Status: > 8 hours           Airway   Mallampati: I  TM distance: >3 FB  Neck ROM: full  No difficulty expected  Dental      Pulmonary    (+) a smoker (quit >20 years ago) Former,   (-) asthma  Cardiovascular   Exercise tolerance: poor (<4 METS)    PT is on anticoagulation therapy  Patient on routine beta blocker and Beta blocker given within 24 hours of surgery    (+) hypertension, past MI , CAD, cardiac stents (pt reports 3-4 heart stents placed in 90s) more than 12 months ago dysrhythmias Atrial Fib, CHF , hyperlipidemia,     ROS comment: 9/2022: · Left ventricular ejection fraction appears to be 41 - 45%. Left ventricular systolic function is mildly decreased.  · Left atrial volume is moderately increased.  · Left ventricular diastolic function is consistent with (grade I) impaired relaxation.  · Abnormal global longitudinal LV strain (GLS) = -14.0%  · The following left ventricular wall segments are akinetic: apical anterior, apical lateral, apical inferior, apical septal and apex.  · Estimated right ventricular systolic pressure from tricuspid regurgitation is normal (<35 mmHg).        Neuro/Psych  (-) seizures, TIA, CVA  GI/Hepatic/Renal/Endo    (+)  PUD,  renal disease CRI and ARF, diabetes mellitus type 2,   (-) liver disease    Musculoskeletal         ROS comment: Spinal stenosis L3-4, sciatica, pt reports inability to walk x 3-4 months  Abdominal    Substance History      OB/GYN          Other   arthritis,          Phys Exam Other: Two lower teeth remaining, none loose                  Anesthesia Plan    ASA 3 - emergent     MAC     intravenous induction     Anesthetic plan, risks, benefits, and alternatives have been provided, discussed and informed consent has been obtained with: patient.

## 2022-09-20 NOTE — PLAN OF CARE
Goal Outcome Evaluation:  Plan of Care Reviewed With: patient           Outcome Evaluation: Pt. had EGD performed today. Colonscopy planned for tomorrow; consent signed in chart. Clear liquid diet. Strict NPO at midnight. IVF discontinued. Right eye with drainage;  notified. 1 unit of blood given for Hgb of 7.5. Up in chair. Voiding. BM today; liquid green/brown. VSS. Safety maintained.

## 2022-09-20 NOTE — PLAN OF CARE
Goal Outcome Evaluation:           Progress: improving  Outcome Evaluation: Pt A&Ox4. PAtient resting in bed, remaining NPO sice medinight. No iv fluids at this time      Problem: Adult Inpatient Plan of Care  Goal: Plan of Care Review  Outcome: Ongoing, Progressing  Flowsheets  Taken 9/20/2022 0355 by Laine Renae RN  Progress: improving  Outcome Evaluation: Pt A&Ox4. PAtient resting in bed, remaining NPO sice medinight. No iv fluids at this time  Taken 9/19/2022 1551 by Monica Jama RN  Plan of Care Reviewed With: patient  Goal: Patient-Specific Goal (Individualized)  Outcome: Ongoing, Progressing  Goal: Absence of Hospital-Acquired Illness or Injury  Outcome: Ongoing, Progressing  Intervention: Identify and Manage Fall Risk  Recent Flowsheet Documentation  Taken 9/19/2022 2200 by Laine Renae RN  Safety Promotion/Fall Prevention: safety round/check completed  Taken 9/19/2022 2019 by Laine Renae RN  Safety Promotion/Fall Prevention: safety round/check completed  Intervention: Prevent Skin Injury  Recent Flowsheet Documentation  Taken 9/19/2022 2019 by Laine Renae RN  Body Position: position changed independently  Intervention: Prevent and Manage VTE (Venous Thromboembolism) Risk  Recent Flowsheet Documentation  Taken 9/19/2022 2358 by Laine Renae RN  Activity Management:   ambulated in room   ambulated to bathroom  Taken 9/19/2022 2019 by Laine Renae RN  Activity Management: up in chair  VTE Prevention/Management:   bilateral   sequential compression devices on  Range of Motion: active ROM (range of motion) encouraged  Intervention: Prevent Infection  Recent Flowsheet Documentation  Taken 9/19/2022 2019 by Laine Renae RN  Infection Prevention:   rest/sleep promoted   single patient room provided  Goal: Optimal Comfort and Wellbeing  Outcome: Ongoing, Progressing  Goal: Readiness for Transition of Care  Outcome: Ongoing, Progressing     Problem: Hypertension Comorbidity  Goal: Blood  Pressure in Desired Range  Outcome: Ongoing, Progressing  Intervention: Maintain Blood Pressure Management  Recent Flowsheet Documentation  Taken 9/19/2022 2019 by Laine Renae RN  Medication Review/Management: medications reviewed     Problem: Osteoarthritis Comorbidity  Goal: Maintenance of Osteoarthritis Symptom Control  Outcome: Ongoing, Progressing  Intervention: Maintain Osteoarthritis Symptom Control  Recent Flowsheet Documentation  Taken 9/19/2022 2358 by Laine Renae RN  Activity Management:   ambulated in room   ambulated to bathroom  Taken 9/19/2022 2019 by Laine Renae RN  Activity Management: up in chair  Medication Review/Management: medications reviewed     Problem: Pain Chronic (Persistent) (Comorbidity Management)  Goal: Acceptable Pain Control and Functional Ability  Outcome: Ongoing, Progressing  Intervention: Manage Persistent Pain  Recent Flowsheet Documentation  Taken 9/19/2022 2019 by Laine Renae RN  Medication Review/Management: medications reviewed     Problem: Fall Injury Risk  Goal: Absence of Fall and Fall-Related Injury  Outcome: Ongoing, Progressing  Intervention: Identify and Manage Contributors  Recent Flowsheet Documentation  Taken 9/19/2022 2019 by Laine Renae RN  Medication Review/Management: medications reviewed  Intervention: Promote Injury-Free Environment  Recent Flowsheet Documentation  Taken 9/19/2022 2200 by Laine Renae RN  Safety Promotion/Fall Prevention: safety round/check completed  Taken 9/19/2022 2019 by Laine Renae RN  Safety Promotion/Fall Prevention: safety round/check completed

## 2022-09-21 NOTE — DISCHARGE SUMMARY
[facility] Medicine Services  DISCHARGE SUMMARY       Date of Admission: 9/14/2022  Date of Discharge:  9/21/2022  Primary Care Physician: Ki Tamez MD    Presenting Problem/Chief Complaint:  Black stool    Final Discharge Diagnoses:  Active Hospital Problems    Diagnosis    • **Upper GI hemorrhage    • STAN (acute kidney injury) (HCC)    • Acute blood loss anemia    • Chronic combined systolic and diastolic CHF (congestive heart failure) (HCC)    • Paroxysmal atrial fibrillation (HCC)    • CAD (coronary artery disease)    • HTN (hypertension)        Consults:   #1 Dr. Beauchamp, GI    Procedures Performed:   EGD on 9/20 by Dr. Beauchamp  Colonoscopy on 9/21 by Dr. Beauchamp    Pertinent Test Results:   Results for orders placed during the hospital encounter of 09/14/22    Adult Transthoracic Echo Complete W/ Cont if Necessary Per Protocol    Interpretation Summary  · Left ventricular ejection fraction appears to be 41 - 45%. Left ventricular systolic function is mildly decreased.  · Left atrial volume is moderately increased.  · Left ventricular diastolic function is consistent with (grade I) impaired relaxation.  · Abnormal global longitudinal LV strain (GLS) = -14.0%  · The following left ventricular wall segments are akinetic: apical anterior, apical lateral, apical inferior, apical septal and apex.  · Estimated right ventricular systolic pressure from tricuspid regurgitation is normal (<35 mmHg).      Imaging Results (All)     Procedure Component Value Units Date/Time    US Renal Bilateral [[id number]] Collected: 09/16/22 0900     Updated: 09/16/22 0906    Narrative:      HISTORY: Acute kidney injury     Renal ultrasound: Sonographic imaging of the kidneys and bladder  performed     COMPARISON: CT abdomen pelvis 9/14/2022     FINDINGS: Kidneys are normal in echotexture. The right kidney measures  10.7 x 6.0 x 5.8 cm. There are scattered right renal cysts, largest  measuring 3.5 x  3.1 x 2.6 cm within the midportion of the right kidney.  The left kidney measures 10.8 x 7.0 x 6.3 cm. There is an inferior 2.9  cm left renal cyst. There is no solid appearing renal mass. No  obstructing intrarenal stones. No abnormal perinephric fluid collection.     The bladder is only mild to moderately distended. Small bladder wall  diverticula suspected. Mild bladder wall thickening may be due to  underdistention or muscle wall hypertrophy.       Impression:      1. Bilateral renal cysts. No solid renal mass or hydronephrosis.  2. Mild bladder wall thickening may be due to incomplete distention,  however muscle wall hypertrophy or possibly cystitis considered. Small  bladder wall diverticula.  This report was finalized on 09/16/2022 09:02 by Dr. Janeth Platt MD.    CT Abdomen Pelvis Without Contrast [[id number]] Collected: 09/14/22 1037     Updated: 09/14/22 1046    Narrative:      CT ABDOMEN PELVIS WO CONTRAST- 9/14/2022 10:26 AM CDT     HISTORY: gi hemorrhage      COMPARISON: None     DOSE LENGTH PRODUCT: 335 mGy cm. Automated exposure control was also  utilized to decrease patient radiation dose.     TECHNIQUE: Axial images the abdomen pelvis are performed without IV or  oral contrast     FINDINGS:  Few scattered subcentimeter hypodensities liver too small to  definitively characterize on nonenhanced imaging. Spleen is normal in  size and appearance. No pancreatic cyst or mass identified. No  peripancreatic inflammation. Several gallstones are identified with no  pericholecystic inflammation or biliary dilatation. 1.6 cm left adrenal  nodule with Hounsfield units measuring less than 10 favoring adenoma.     Low-density renal lesions favoring cysts. No abnormal perinephric fluid  collection. No hydronephrosis. No ureteral stones. Multiple calcified  phleboliths of the pelvis. Mild prostate enlargement measuring 5.2 cm in  width. The bladder is unremarkable.     There is no evidence for bowel obstruction.  There are cysts scattered  diverticuli of the left colon with no significant mesenteric edema.  Moderate stool of the rectum. Diffuse vascular calcification with a  saccular 3 cm aneurysm of the infrarenal abdominal aorta. No pathologic  lymphadenopathy identified.     Basilar atelectasis.     Osteopenia degenerative change of the regional skeleton. Postoperative  changes right proximal femur.       Impression:      1. Left colonic diverticulosis with no convincing radiographic evidence  of acute diverticulitis. The right colon is underdistended. The appendix  is normal. There is moderate stool of the rectum. No free air or  abscess.  2. Cholelithiasis.  3. 1.6 cm hypodense left adrenal nodule favoring adenoma. Low-density  renal lesions strongly favoring benign cysts.  4. Subcentimeter hypodense foci of the liver too small to definitively  characterize.  5. Mild prostate enlargement.  6. 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 Diffuse  vascular calcification.  This report was finalized on 09/14/2022 10:43 by Dr. Janeth Platt MD.    CT Head Without Contrast [[id number]] Collected: 09/14/22 0943     Updated: 09/14/22 0951    Narrative:      EXAMINATION: CT HEAD WO CONTRAST-      9/14/2022 8:57 AM CDT     HISTORY: gait instability     In order to have a CT radiation dose as low as reasonably achievable  Automated Exposure Control was utilized for adjustment of the mA and/or  KV according to patient size.     DLP in mGycm= 660     The CT scan of the head is performed without contrast enhancement.     Images are acquired in axial plane and subsequent reconstruction in  coronal and sagittal planes.     There is no previous study for comparison.     There is no evidence of a mass. There is no evidence of midline shift.     There is no evidence of intracranial hemorrhage or hematoma.     Moderate dilatation of ventricles, basal cisterns and cortical sulci  suggest a chronic volume loss.     There are  scattered areas of chronic white matter ischemia in the  centrum semiovale bilaterally. The gray-white matter differentiation is  maintained.     There are scattered areas of lacunar infarct in the left basal ganglia.     Images are reviewed in bone window show no acute bony abnormality.  Limited visualized paranasal sinuses and mastoid air cells are clear.  Atheromatous change of the intracranial internal carotid arteries are  seen. A small focus of bony sclerosis in the right frontal supraorbital  bone may represent a bone island.       Impression:      1. No acute intracranial abnormality.  2. Chronic ischemic and atrophic changes.                             This report was finalized on 09/14/2022 09:48 by Dr. Filomena Dougherty MD.    XR Chest 1 View [[id number]] Collected: 09/14/22 0900     Updated: 09/14/22 0903    Narrative:      EXAMINATION: Chest 1 view 9/14/2022     HISTORY: Chills and rigors with weakness.     FINDINGS: Today's exam is compared to previous study of 9/3/2021. There  is elevation left diaphragm with left basilar atelectasis. Lungs are  otherwise clear. There is no effusion or free air present. There is mild  gaseous distention of the stomach.       Impression:      1.. Mild elevation of the left diaphragm with left basilar atelectasis.  Lungs are otherwise clear.  This report was finalized on 09/14/2022 09:00 by Dr. Asim Aguilera MD.          LAB RESULTS:      Lab 09/21/22  0524 09/20/22  0834 09/19/22  1929 09/19/22  0816 09/19/22  0127 09/18/22  1900 09/18/22  1652 09/18/22  0804 09/18/22  0439 09/17/22  1943 09/17/22  0638   WBC 6.51 6.45  --   --   --   --   --   --   --   --   --    HEMOGLOBIN 8.2* 7.5*  7.5* 7.6* 7.6*  --  8.2*  --    < >  --    < > 7.7*   HEMATOCRIT 25.2* 23.1*  23.1* 22.3* 23.1*  --  24.1*  --    < >  --    < > 23.3*   PLATELETS 176 176  --   --   --   --   --   --   --   --   --    MCV 89.4 91.3  --   --   --   --   --   --   --   --   --    PROTIME  --  15.5*   --   --  19.7*  --  22.6*  --  22.0*  --  23.2*    < > = values in this interval not displayed.         Lab 09/21/22  0524 09/20/22  0834 09/19/22  0816 09/18/22  0439 09/17/22  0638   SODIUM 142 141 141 143 146*   POTASSIUM 3.4* 3.7 3.4* 3.5 3.7   CHLORIDE 110* 109* 110* 112* 114*   CO2 21.0* 22.0 23.0 24.0 25.0   ANION GAP 11.0 10.0 8.0 7.0 7.0   BUN 10 12 14 22 36*   CREATININE 1.28* 1.42* 1.39* 1.41* 1.51*   EGFR 54.2* 47.8* 49.1* 48.2* 44.4*   GLUCOSE 86 92 100* 101* 109*   CALCIUM 8.2* 8.1* 8.1* 8.1* 8.0*   MAGNESIUM  --  1.9  --   --   --              Lab 09/20/22  0834 09/19/22  0127 09/18/22  1652 09/18/22  0439 09/17/22  0638   PROTIME 15.5* 19.7* 22.6* 22.0* 23.2*   INR 1.28* 1.75* 2.08* 2.01* 2.16*             Lab 09/20/22  1417   ABO TYPING A   RH TYPING Positive   ANTIBODY SCREEN Negative         Brief Urine Lab Results  (Last result in the past 365 days)      Color   Clarity   Blood   Leuk Est   Nitrite   Protein   CREAT   Urine HCG        09/15/22 1317             50.6             Microbiology Results (last 10 days)     Procedure Component Value - Date/Time    Eosinophil Smear - Urine, Urine, Clean Catch [[id number]]  (Normal) Collected: 09/15/22 1317    Lab Status: Final result Specimen: Urine, Clean Catch Updated: 09/16/22 0751     Eosinophil Smear 0 % EOS/100 Cells     COVID PRE-OP / PRE-PROCEDURE SCREENING ORDER (NO ISOLATION) - Swab, Nasal Cavity [[id number]]  (Normal) Collected: 09/14/22 0929    Lab Status: Final result Specimen: Swab from Nasal Cavity Updated: 09/14/22 1037    Narrative:      The following orders were created for panel order COVID PRE-OP / PRE-PROCEDURE SCREENING ORDER (NO ISOLATION) - Swab, Nasal Cavity.  Procedure                               Abnormality         Status                     ---------                               -----------         ------                     COVID-19,[employer] [facility]...[[id number]]  Normal              Final result                 Please view  results for these tests on the individual orders.    COVID-19,[employer] IN-HOUSE,Nasal Swab No Transport Media 3-4 HR TAT - Swab, Nasal Cavity [[id number]]  (Normal) Collected: 09/14/22 0929    Lab Status: Final result Specimen: Swab from Nasal Cavity Updated: 09/14/22 1037     COVID19 Not Detected    Narrative:      Fact sheet for providers: https://www.fda.gov/media/464194/download     Fact sheet for patients: https://www.fda.gov/media/545410/download    Test performed by PCR.    Consider negative results in combination with clinical observations, patient history, and epidemiological information.  Fact sheet for providers: https://www.fda.gov/media/613874/download     Fact sheet for patients: https://www.[employer].gov/media/996639/download    Test performed by PCR.    Consider negative results in combination with clinical observations, patient history, and epidemiological information.    Blood Culture - Blood, Arm, Left [[id number]]  (Normal) Collected: 09/14/22 0915    Lab Status: Final result Specimen: Blood from Arm, Left Updated: 09/19/22 0932     Blood Culture No growth at 5 days    Blood Culture - Blood, Arm, Left [[id number]]  (Normal) Collected: 09/14/22 0915    Lab Status: Final result Specimen: Blood from Arm, Left Updated: 09/19/22 0932     Blood Culture No growth at 5 days          Chief Complaint on Day of Discharge:   Follow-up GI bleed    History of Present Illness on Day of Discharge:   Patient resting comfortably in bed.  Feels better today.  Denies any shortness of breath or chest pain.  No chest discomfort with ambulation either.  Tolerated unit of blood yesterday with no signs of bleeding overnight.  Plans for colonoscopy today.  Patient tells me he wants to go home after the colonoscopy.    Hospital Course:  The patient is a 87 y.o. male with a history of paroxysmal atrial fibrillation on Coumadin.  Also has a history of CAD, hypertension, hyperlipidemia.  Systolic CHF.  Patient presented to the  "hospital on 9/14 with a 1 week history of dark stools.  He also takes NSAIDs to include Aleve up to 3 times a day for arthritis pain.  He was occult positive.  He was admitted to the hospital on a PPI drip for GI bleed and STAN.  Hemoglobin was 12.7 on arrival and drifted all the way down to 7.5.  Patient was having some chest discomfort on ambulation when he was at 7.5 so he was given a unit of blood.  This resolved his issues.  No signs of ACS.  He is never short of breath.  His home diuretics were held and he was fluid resuscitated.  With that he had improvement of acute renal failure with creatinine coming from 2.1 down to 1.28.  INR was also elevated on arrival at 2.2 GI wanted it below 1.5 for colonoscopy.  His Coumadin was held initially but his INR was not drifting down over the first several days.  He was given vitamin K.  Eventually his INR was 1.28 and he was taken for EGD on 9/20 without any significant findings of ulcers or bleeding.  He was returned to the [facility] and then underwent a colonoscopy on 9/21 again without any obvious signs of bleeding or acute issues.  Overall he stable and wants to go home.  Per GI okay to resume blood thinners at discharge.  Discussed discussed no NSAIDs with patient.  We will allow his INR to drift up on Coumadin as he is in sinus rhythm and also to monitor for any recurrence of bleeding.  He may be some effort to get his INR back up given vitamin K administration during hospital stay.  He will need close interval follow-up with outpatient provider for an INR check in the next 2 to 3 days and subsequent Coumadin dosing INR monitoring from there.    Condition on Discharge:  Stable on room air    Physical Exam on Discharge:  /41   Pulse 58   Temp 98.5 °F (36.9 °C) (Oral)   Resp 17   Ht 170.2 cm (67\")   Wt 79.1 kg (174 lb 6.4 oz)   SpO2 94%   BMI 27.31 kg/m²   Physical Exam  GEN: Awake, alert, interactive, in NAD  HEENT:  PERRLA, EOMI, Anicteric, Trachea " midline  Lungs:  no wheezing/rales/rhonchi  Heart: RRR, +S1/s2, no rub  ABD: soft, nt/nd, +BS, no guarding/rebound  Extremities: atraumatic, no cyanosis, trace edema b/l LE   Skin: no rashes or petechiae   Neuro: AAOx3, no focal deficits  Psych: normal mood & affect    Discharge Disposition:  Home or Self Care    Discharge Medications:     Discharge Medications      New Medications      Instructions Start Date   ofloxacin 0.3 % ophthalmic solution  Commonly known as: OCUFLOX   2 drops, Right Eye, 4 Times Daily   Start Date: September 22, 2022        Continue These Medications      Instructions Start Date   acetaminophen 325 MG tablet  Commonly known as: TYLENOL   650 mg, Oral, Every 6 Hours PRN      furosemide 40 MG tablet  Commonly known as: LASIX   40 mg, Oral, Every Morning      hydroCHLOROthiazide 25 MG tablet  Commonly known as: HYDRODIURIL   25 mg, Oral, Daily      lisinopril 30 MG tablet  Commonly known as: PRINIVIL,ZESTRIL   30 mg, Oral, Daily      metoprolol tartrate 25 MG tablet  Commonly known as: LOPRESSOR   50 mg, Oral, 2 Times Daily      OSTEO BI-FLEX ADV DOUBLE ST PO   1 tablet, Oral, Daily      simvastatin 40 MG tablet  Commonly known as: ZOCOR   40 mg, Oral, Nightly      STOOL SOFTENER & LAXATIVE PO   Oral, 2 Times Daily PRN      warfarin 5 MG tablet  Commonly known as: COUMADIN   5 mg, Oral, Nightly, 1 t by mouth Monday, Wednesday, and Friday. 1/2 t by mouth Tuesday, Thursday, Saturday, and Sunday             Discharge Diet:    cardiac    Activity at Discharge:  Increase to baseline     Discharge Care Plan/Instructions: Take your medications as prescribed.  Okay to resume Coumadin per GI.  Will allow INR to drift up.  You need close interval follow-up and INR check performed in the next 2 to 3 days by outpatient provider as prior with further Coumadin dosing.  Follow-up with PCP.  Follow-up with GI as needed.    Follow-up Appointments:   Future Appointments   Date Time Provider Department Center    7/14/2023  8:30 AM Delano Padilla MD [facility]       Test Results Pending at Discharge: None    Electronically signed by Celestine Ramirez DO, 09/21/22, 14:16 CDT.    Time: 35 minutes

## 2022-09-21 NOTE — H&P
Pre Procedure History & Physical    Chief Complaint:   GI bleed    Subjective     HPI:   This is a 87-year-old gentleman with history of NSAID use and on Coumadin who was admitted with a upper GI type of bleeding.  He had melenic stool.  EGD yesterday showed mild gastritis and duodenitis but no obvious lesion.  Procedure is being taken to evaluate for any lower GI source of blood loss.    Past Medical History:   Past Medical History:   Diagnosis Date   • Benign essential hypertension    • CAD (coronary artery disease)    • CAD in native artery     Story: lexiscan 1/2012 negative for ischemia   • Degeneration of lumbar or lumbosacral intervertebral disc 11/13/2019   • Enlarged prostate    • History of coronary artery stent placement 2/11/2019   • HTN (hypertension) 1/26/2017   • Hyperlipemia, mixed     Lipid panel (10/2014) 143/48/81/189   • Hyperlipidemia    • Hypertension    • Myocardial infarct (HCC) 1/26/2017   • Myocardial infarction (HCC)        Past Surgical History:  Past Surgical History:   Procedure Laterality Date   • BACK SURGERY     • CAROTID STENT     • ENDOSCOPY N/A 9/20/2022    Procedure: ESOPHAGOGASTRODUODENOSCOPY WITH ANESTHESIA;  Surgeon: Agustín Beauchamp MD;  Location: [facility] ENDOSCOPY;  Service: Gastroenterology;  Laterality: N/A;  preop; GI bleed   postop; normal   PCP Ki Tamez    • LEG SURGERY      a alberto was put in.  pt broke his leg   • LUMBAR LAMINECTOMY Left 12/31/2019    Procedure: LEFT LUMBAR HEMILAMINECTOMY WITHOUT FUSION AT LUMBAR 3/4 AND 4/5;  Surgeon: Stalin Williamson MD;  Location: [facility] OR;  Service: Neurosurgery   • ROTATOR CUFF REPAIR Bilateral    • SHOULDER SURGERY     • TENNIS ELBOW RELEASE         Family History:  Family History   Problem Relation Age of Onset   • ALS Mother    • Heart attack Father    • Alzheimer's disease Maternal Grandfather    • Heart attack Maternal Grandfather    • Heart attack Paternal Grandfather        Social History:   reports that he quit  "smoking about 32 years ago. His smoking use included cigarettes. He started smoking about 66 years ago. He has quit using smokeless tobacco. He reports that he does not drink alcohol and does not use drugs.    Medications:   Medications Prior to Admission   Medication Sig Dispense Refill Last Dose   • acetaminophen (TYLENOL) 325 MG tablet Take 650 mg by mouth Every 6 (Six) Hours As Needed for Mild Pain .      • furosemide (LASIX) 40 MG tablet Take 40 mg by mouth Every Morning.      • hydroCHLOROthiazide (HYDRODIURIL) 25 MG tablet Take 1 tablet by mouth Daily. 90 tablet 3    • lisinopril (PRINIVIL,ZESTRIL) 30 MG tablet Take 1 tablet by mouth Daily. 90 tablet 3    • metoprolol tartrate (LOPRESSOR) 25 MG tablet Take 2 tablets by mouth 2 (Two) Times a Day. 180 tablet 3    • Misc Natural Products (OSTEO BI-FLEX ADV DOUBLE ST PO) Take 1 tablet by mouth Daily.      • Sennosides-Docusate Sodium (STOOL SOFTENER & LAXATIVE PO) Take  by mouth 2 (Two) Times a Day As Needed.      • simvastatin (ZOCOR) 40 MG tablet Take 40 mg by mouth Every Night.      • warfarin (COUMADIN) 5 MG tablet Take 5 mg by mouth Every Night. 1 t by mouth Monday, Wednesday, and Friday. 1/2 t by mouth Tuesday, Thursday, Saturday, and Sunday          Allergies:  Penicillins        Objective     Blood pressure 176/64, pulse 58, temperature 98.5 °F (36.9 °C), temperature source Oral, resp. rate 16, height 170.2 cm (67\"), weight 79.1 kg (174 lb 6.4 oz), SpO2 96 %.    Physical Exam   Constitutional: Pt is oriented to person, place, and time and well-developed, well-nourished, and in no distress.   Mouth/Throat: Oropharynx is clear and moist.   Neck: Normal range of motion.   Cardiovascular: Normal rate, regular rhythm and normal heart sounds.    Pulmonary/Chest: Effort normal and breath sounds normal.   Abdominal: Soft. Nontender  Skin: Skin is warm and dry.   Psychiatric: Mood, memory, affect and judgment normal.     Assessment & Plan     Diagnosis:  GI bleed. "  EGD fairly negative.    Anticipated Surgical Procedure:  Colonoscopy for further evaluation.    The risks, benefits, and alternatives of this procedure have been discussed with the patient or the responsible party- the patient understands and agrees to proceed.

## 2022-09-21 NOTE — ANESTHESIA POSTPROCEDURE EVALUATION
Patient: Mickey Conde    Procedure Summary     Date: 09/21/22 Room / Location: [facility] ENDOSCOPY 4 / [facility] ENDOSCOPY    Anesthesia Start: 1131 Anesthesia Stop: 1147    Procedure: COLONOSCOPY WITH ANESTHESIA (N/A ) Diagnosis:       Acute blood loss anemia      (Acute blood loss anemia [D62])    Surgeons: Agustín Beauchamp MD Provider: Celestine Craig CRNA    Anesthesia Type: MAC ASA Status: 3 - Emergent          Anesthesia Type: MAC    Vitals  Vitals Value Taken Time   /41 09/21/22 1216   Temp     Pulse 56 09/21/22 1217   Resp 17 09/21/22 1215   SpO2 94 % 09/21/22 1217   Vitals shown include unvalidated device data.        Post Anesthesia Care and Evaluation    Patient location during evaluation: PHASE II  Patient participation: complete - patient participated  Level of consciousness: awake  Pain management: adequate    Airway patency: patent  Anesthetic complications: No anesthetic complications  PONV Status: none  Cardiovascular status: acceptable  Respiratory status: acceptable  Hydration status: acceptable

## 2022-09-21 NOTE — PROGRESS NOTES
Nutrition Services    Patient Name:  Mickey Conde  YOB: 1935  MRN: [id number]  Admit Date:  9/14/2022    Nutr follow up. Pt has been on NPO/clear liquids since admission 9/14 (X7 days without adequate oral intake to meet estimated needs).  Completed  EGD 9/20 and in for colonoscopy today for GI bleed.  If NPO/Clear liquids expected to continue, Pt may benefit from initiation of enteral/parenteral feeding if appropriate with POC goals.  If desired, RD available for recommendations.   Will continue to follow for progress and POC goals.     Electronically signed by:  Sheila Johnson RDN, CELINA  09/21/22 11:31 CDT

## 2022-09-21 NOTE — PLAN OF CARE
Goal Outcome Evaluation:           Progress: no change  Outcome Evaluation: Nutr follow up. Pt has been on NPO/clear liquids since admission 9/14 (X7 days without adequate oral intake to meet estimated needs).  Completed  EGD 9/20 and in for colonoscopy today for GI bleed. Will cont to follow for nutrition needs and POC. Please consult RD for recommendations for alt means of nutrition support (enteral or parenteral) if desired per POC.

## 2022-09-21 NOTE — PLAN OF CARE
Goal Outcome Evaluation:  Plan of Care Reviewed With: patient           Outcome Evaluation: Pt's endoscopy was negative. Pt denies pain. Tolerating clears. Safety maintained. Pt hopes to go home today.

## 2022-09-21 NOTE — PLAN OF CARE
Goal Outcome Evaluation:  Plan of Care Reviewed With: patient        Progress: improving  Outcome Evaluation: VSS. Patient is completing bowel prep throuhgout the night remaining on a clear liquid diet until midnight. Patient understands that he needs to complete the prep as close to 6 am as possible. Patient bm are starting to clear up. No annemarie blood noticed.      Problem: Adult Inpatient Plan of Care  Goal: Plan of Care Review  Outcome: Ongoing, Progressing  Flowsheets  Taken 9/21/2022 0135  Plan of Care Reviewed With: patient  Outcome Evaluation: VSS. Patient is completing bowel prep throuhgout the night remaining on a clear liquid diet until midnight. Patient understands that he needs to complete the prep as close to 6 am as possible. Patient bm are starting to clear up. No annemarie blood noticed.  Taken 9/20/2022 0355  Progress: improving  Goal: Patient-Specific Goal (Individualized)  Outcome: Ongoing, Progressing  Goal: Absence of Hospital-Acquired Illness or Injury  Outcome: Ongoing, Progressing  Intervention: Identify and Manage Fall Risk  Recent Flowsheet Documentation  Taken 9/20/2022 2000 by Laine Renae RN  Safety Promotion/Fall Prevention: safety round/check completed  Intervention: Prevent and Manage VTE (Venous Thromboembolism) Risk  Recent Flowsheet Documentation  Taken 9/20/2022 2358 by Laine Renae, RN  Activity Management:   up ad connie   up to bedside commode  Taken 9/20/2022 2000 by Laine Renae, RN  Activity Management: up in chair  VTE Prevention/Management: patient refused intervention  Intervention: Prevent Infection  Recent Flowsheet Documentation  Taken 9/20/2022 2000 by Laine Renae, RN  Infection Prevention: single patient room provided  Goal: Optimal Comfort and Wellbeing  Outcome: Ongoing, Progressing  Goal: Readiness for Transition of Care  Outcome: Ongoing, Progressing     Problem: Hypertension Comorbidity  Goal: Blood Pressure in Desired Range  Outcome: Ongoing,  Progressing  Intervention: Maintain Blood Pressure Management  Recent Flowsheet Documentation  Taken 9/20/2022 2000 by Laine Renae RN  Medication Review/Management: medications reviewed     Problem: Osteoarthritis Comorbidity  Goal: Maintenance of Osteoarthritis Symptom Control  Outcome: Ongoing, Progressing  Intervention: Maintain Osteoarthritis Symptom Control  Recent Flowsheet Documentation  Taken 9/20/2022 2358 by Laine Renae RN  Activity Management:   up ad connie   up to bedside commode  Taken 9/20/2022 2000 by Laine Renae RN  Activity Management: up in chair  Medication Review/Management: medications reviewed     Problem: Pain Chronic (Persistent) (Comorbidity Management)  Goal: Acceptable Pain Control and Functional Ability  Outcome: Ongoing, Progressing  Intervention: Manage Persistent Pain  Recent Flowsheet Documentation  Taken 9/20/2022 2000 by Laine Renae RN  Medication Review/Management: medications reviewed     Problem: Fall Injury Risk  Goal: Absence of Fall and Fall-Related Injury  Outcome: Ongoing, Progressing  Intervention: Identify and Manage Contributors  Recent Flowsheet Documentation  Taken 9/20/2022 2000 by Laine Renae RN  Medication Review/Management: medications reviewed  Intervention: Promote Injury-Free Environment  Recent Flowsheet Documentation  Taken 9/20/2022 2000 by Laine Renae RN  Safety Promotion/Fall Prevention: safety round/check completed

## 2022-09-22 NOTE — OUTREACH NOTE
Prep Survey    Flowsheet Row Responses   [facility] facility patient discharged from? [place]   Is LACE score < 7 ? No   Emergency Room discharge w/ pulse ox? No   Eligibility Readm Mgmt   Discharge diagnosis GI bleed   Does the patient have one of the following disease processes/diagnoses(primary or secondary)? Other   Does the patient have Home health ordered? No   Is there a DME ordered? No   Prep survey completed? Yes          LENIN ORTIZ - Registered Nurse

## 2022-09-27 NOTE — OUTREACH NOTE
Medical Week 1 Survey    Flowsheet Row Responses   [facility] facility patient discharged from? [place]   Does the patient have one of the following disease processes/diagnoses(primary or secondary)? Other   Week 1 attempt successful? No   Unsuccessful attempts Attempt 1          CHRISSIE NICHOLSON - Registered Nurse

## 2022-09-30 NOTE — OUTREACH NOTE
Medical Week 1 Survey    Flowsheet Row Responses   [facility] patient discharged from? [place]   Does the patient have one of the following disease processes/diagnoses(primary or secondary)? Other   Week 1 attempt successful? Yes   Call start time 1300   Call end time 1301   Discharge diagnosis GI bleed   Person spoke with today (if not patient) and relationship Harish- suzanna   Meds reviewed with patient/caregiver? Yes   Is the patient taking all medications as directed (includes completed medication regime)? Yes   Does the patient have a primary care provider?  Yes   Does the patient have an appointment with their PCP within 7 days of discharge? Yes   Has the patient kept scheduled appointments due by today? Yes   Has home health visited the patient within 72 hours of discharge? N/A   Psychosocial issues? No   Did the patient receive a copy of their discharge instructions? Yes   Nursing interventions Reviewed instructions with patient   What is the patient's perception of their health status since discharge? Improving   Is the patient/caregiver able to teach back signs and symptoms related to disease process for when to call PCP? Yes   Is the patient/caregiver able to teach back signs and symptoms related to disease process for when to call 911? Yes   Is the patient/caregiver able to teach back the hierarchy of who to call/visit for symptoms/problems? PCP, Specialist, Home health nurse, Urgent Care, ED, 911 Yes   Week 1 call completed? Yes   Wrap up additional comments Son reports Pt doing well. No needs.           KIMBERLY ROCK - Registered Nurse

## 2023-03-17 ENCOUNTER — TELEPHONE (OUTPATIENT)
Dept: CARDIOLOGY | Facility: CLINIC | Age: 88
End: 2023-03-17

## 2023-03-17 NOTE — TELEPHONE ENCOUNTER
“Please be informed that patient has passed. Patient has been marked  in the system. The date of death is: 2022.    Caller: Harish FUNK    Relationship: Emergency Contact    Best call back number: [phone]

## (undated) DEVICE — RESERVOIR,SUCTION,100CC,SILICONE: Brand: MEDLINE

## (undated) DEVICE — YANKAUER,BULB TIP WITH VENT: Brand: ARGYLE

## (undated) DEVICE — ELECTRD BLD EDGE/INSUL1P 2.4X5.1MM STRL

## (undated) DEVICE — SPONGE,NEURO,0.5"X3",XR,STRL,LF,10/PK: Brand: MEDLINE

## (undated) DEVICE — TBG SMPL FLTR LINE NASL 02/C02 A/ BX/100

## (undated) DEVICE — PK TURNOVER RM ADV

## (undated) DEVICE — ELECTRD BLD EXT EDGE/INSUL 1P 4IN

## (undated) DEVICE — DRN JP FLT NO TROC SIL FUL/PERF 7MM

## (undated) DEVICE — CUFF,BP,DISP,1 TUBE,ADULT,HP: Brand: MEDLINE

## (undated) DEVICE — CATH IV ANGIO FEP 12G 3IN LTBLU 10PK

## (undated) DEVICE — SUT VIC 0 UR6 27IN VCP603H

## (undated) DEVICE — GLV SURG BIOGEL M LTX PF 7

## (undated) DEVICE — GLV SURG SENSICARE PI LF PF 7.5 GRN STRL

## (undated) DEVICE — CONMED SCOPE SAVER BITE BLOCK, 20X27 MM: Brand: SCOPE SAVER

## (undated) DEVICE — GLV SURG BIOGEL LTX PF 6 1/2

## (undated) DEVICE — SENSR O2 OXIMAX FNGR A/ 18IN NONSTR

## (undated) DEVICE — 4-PORT MANIFOLD: Brand: NEPTUNE 2

## (undated) DEVICE — SUT MNCRYL 4/0 PS2 27IN UD MCP426H

## (undated) DEVICE — MASK,OXYGEN,MED CONC,ADLT,7' TUB, UC: Brand: PENDING

## (undated) DEVICE — CLTH CLENS READYCLEANSE PERI CARE PK/5

## (undated) DEVICE — THE CHANNEL CLEANING BRUSH IS A NYLON FLEXI BRUSH ATTACHED TO A FLEXIBLE PLASTIC SHEATH DESIGNED TO SAFELY REMOVE DEBRIS FROM FLEXIBLE ENDOSCOPES.

## (undated) DEVICE — Device: Brand: DEFENDO AIR/WATER/SUCTION AND BIOPSY VALVE

## (undated) DEVICE — SPK10277 JACKSON/PRO-AXIS KIT: Brand: SPK10277 JACKSON/PRO-AXIS KIT

## (undated) DEVICE — PK SPINE POST 30

## (undated) DEVICE — CONN FLX BREATHE CIRCT

## (undated) DEVICE — ANTIBACTERIAL VIOLET BRAIDED (POLYGLACTIN 910), SYNTHETIC ABSORBABLE SUTURE: Brand: COATED VICRYL

## (undated) DEVICE — DRP C/ARMOR

## (undated) DEVICE — 3.0MM PRECISION NEURO (MATCH HEAD)

## (undated) DEVICE — APPL DURAPREP IODOPHOR APL 26ML

## (undated) DEVICE — HANDPIECE SET WITH HIGH FLOW TIP AND SUCTION TUBE: Brand: INTERPULSE

## (undated) DEVICE — SUT VIC 0 MO4 CR8 18IN VCP701D

## (undated) DEVICE — TOTAL TRAY, 16FR 10ML SIL FOLEY, URN: Brand: MEDLINE

## (undated) DEVICE — FRCP BIOP ENDO CAPTURAPRO SPK SERR 2.8MM 230CM